# Patient Record
Sex: FEMALE | Race: WHITE | NOT HISPANIC OR LATINO | Employment: FULL TIME | URBAN - NONMETROPOLITAN AREA
[De-identification: names, ages, dates, MRNs, and addresses within clinical notes are randomized per-mention and may not be internally consistent; named-entity substitution may affect disease eponyms.]

---

## 2017-03-03 ENCOUNTER — HOSPITAL ENCOUNTER (EMERGENCY)
Facility: HOSPITAL | Age: 52
Discharge: HOME | End: 2017-03-03
Admitting: EMERGENCY MEDICINE

## 2017-03-03 VITALS
HEART RATE: 89 BPM | DIASTOLIC BLOOD PRESSURE: 59 MMHG | BODY MASS INDEX: 43.16 KG/M2 | SYSTOLIC BLOOD PRESSURE: 101 MMHG | HEIGHT: 64 IN | RESPIRATION RATE: 15 BRPM | OXYGEN SATURATION: 93 % | WEIGHT: 252.8 LBS | TEMPERATURE: 98.5 F

## 2017-03-03 DIAGNOSIS — J10.1 INFLUENZA B: ICD-10-CM

## 2017-03-03 DIAGNOSIS — G44.019 EPISODIC CLUSTER HEADACHE, NOT INTRACTABLE: Primary | ICD-10-CM

## 2017-03-03 LAB
ALBUMIN SERPL-MCNC: 4 G/DL (ref 3.5–5)
ALBUMIN/GLOB SERPL: 1.3 G/DL (ref 1.1–2.5)
ALP SERPL-CCNC: 140 U/L (ref 24–120)
ALT SERPL W P-5'-P-CCNC: 82 U/L (ref 0–54)
ANION GAP SERPL CALCULATED.3IONS-SCNC: 12 MMOL/L (ref 4–13)
AST SERPL-CCNC: 73 U/L (ref 7–45)
BASOPHILS # BLD AUTO: 0.02 10*3/MM3 (ref 0–0.2)
BASOPHILS NFR BLD AUTO: 0.6 % (ref 0–2)
BILIRUB SERPL-MCNC: 0.3 MG/DL (ref 0.1–1)
BUN BLD-MCNC: 11 MG/DL (ref 5–21)
BUN/CREAT SERPL: 12 (ref 7–25)
CALCIUM SPEC-SCNC: 9.9 MG/DL (ref 8.4–10.4)
CHLORIDE SERPL-SCNC: 100 MMOL/L (ref 98–110)
CO2 SERPL-SCNC: 24 MMOL/L (ref 24–31)
CREAT BLD-MCNC: 0.92 MG/DL (ref 0.5–1.4)
CRP SERPL-MCNC: 1.39 MG/DL (ref 0–0.99)
DEPRECATED RDW RBC AUTO: 46.6 FL (ref 40–54)
EOSINOPHIL # BLD AUTO: 0.02 10*3/MM3 (ref 0–0.7)
EOSINOPHIL NFR BLD AUTO: 0.6 % (ref 0–4)
ERYTHROCYTE [DISTWIDTH] IN BLOOD BY AUTOMATED COUNT: 14.4 % (ref 12–15)
ERYTHROCYTE [SEDIMENTATION RATE] IN BLOOD: 11 MM/HR (ref 0–20)
FLUAV AG NPH QL: NEGATIVE
FLUBV AG NPH QL IA: POSITIVE
GFR SERPL CREATININE-BSD FRML MDRD: 64 ML/MIN/1.73
GLOBULIN UR ELPH-MCNC: 3 GM/DL
GLUCOSE BLD-MCNC: 122 MG/DL (ref 70–100)
HCT VFR BLD AUTO: 46.1 % (ref 37–47)
HGB BLD-MCNC: 15.9 G/DL (ref 12–16)
IMM GRANULOCYTES # BLD: 0.01 10*3/MM3 (ref 0–0.03)
IMM GRANULOCYTES NFR BLD: 0.3 % (ref 0–5)
LYMPHOCYTES # BLD AUTO: 0.71 10*3/MM3 (ref 0.72–4.86)
LYMPHOCYTES NFR BLD AUTO: 20.5 % (ref 15–45)
MCH RBC QN AUTO: 30.7 PG (ref 28–32)
MCHC RBC AUTO-ENTMCNC: 34.5 G/DL (ref 33–36)
MCV RBC AUTO: 89 FL (ref 82–98)
MONOCYTES # BLD AUTO: 0.56 10*3/MM3 (ref 0.19–1.3)
MONOCYTES NFR BLD AUTO: 16.2 % (ref 4–12)
NEUTROPHILS # BLD AUTO: 2.14 10*3/MM3 (ref 1.87–8.4)
NEUTROPHILS NFR BLD AUTO: 61.8 % (ref 39–78)
PLATELET # BLD AUTO: 193 10*3/MM3 (ref 130–400)
PMV BLD AUTO: 10.6 FL (ref 6–12)
POTASSIUM BLD-SCNC: 4 MMOL/L (ref 3.5–5.3)
PROT SERPL-MCNC: 7 G/DL (ref 6.3–8.7)
RBC # BLD AUTO: 5.18 10*6/MM3 (ref 4.2–5.4)
S PYO AG THROAT QL: NEGATIVE
SODIUM BLD-SCNC: 136 MMOL/L (ref 135–145)
WBC NRBC COR # BLD: 3.46 10*3/MM3 (ref 4.8–10.8)

## 2017-03-03 PROCEDURE — 87880 STREP A ASSAY W/OPTIC: CPT | Performed by: EMERGENCY MEDICINE

## 2017-03-03 PROCEDURE — 85025 COMPLETE CBC W/AUTO DIFF WBC: CPT | Performed by: EMERGENCY MEDICINE

## 2017-03-03 PROCEDURE — 96374 THER/PROPH/DIAG INJ IV PUSH: CPT

## 2017-03-03 PROCEDURE — 25010000002 ONDANSETRON PER 1 MG: Performed by: EMERGENCY MEDICINE

## 2017-03-03 PROCEDURE — 80053 COMPREHEN METABOLIC PANEL: CPT | Performed by: EMERGENCY MEDICINE

## 2017-03-03 PROCEDURE — 25010000002 HYDROMORPHONE PER 4 MG: Performed by: EMERGENCY MEDICINE

## 2017-03-03 PROCEDURE — 99284 EMERGENCY DEPT VISIT MOD MDM: CPT

## 2017-03-03 PROCEDURE — 86140 C-REACTIVE PROTEIN: CPT | Performed by: EMERGENCY MEDICINE

## 2017-03-03 PROCEDURE — 25010000002 MEPERIDINE PER 100 MG: Performed by: EMERGENCY MEDICINE

## 2017-03-03 PROCEDURE — 96375 TX/PRO/DX INJ NEW DRUG ADDON: CPT

## 2017-03-03 PROCEDURE — 87804 INFLUENZA ASSAY W/OPTIC: CPT | Performed by: EMERGENCY MEDICINE

## 2017-03-03 PROCEDURE — 96361 HYDRATE IV INFUSION ADD-ON: CPT

## 2017-03-03 PROCEDURE — 87081 CULTURE SCREEN ONLY: CPT | Performed by: EMERGENCY MEDICINE

## 2017-03-03 PROCEDURE — 85651 RBC SED RATE NONAUTOMATED: CPT | Performed by: EMERGENCY MEDICINE

## 2017-03-03 RX ORDER — SODIUM CHLORIDE 9 MG/ML
125 INJECTION, SOLUTION INTRAVENOUS CONTINUOUS
Status: DISCONTINUED | OUTPATIENT
Start: 2017-03-03 | End: 2017-03-03 | Stop reason: HOSPADM

## 2017-03-03 RX ORDER — ACETAMINOPHEN 325 MG/1
650 TABLET ORAL EVERY 6 HOURS PRN
Status: DISCONTINUED | OUTPATIENT
Start: 2017-03-03 | End: 2017-03-03 | Stop reason: HOSPADM

## 2017-03-03 RX ORDER — ONDANSETRON 2 MG/ML
4 INJECTION INTRAMUSCULAR; INTRAVENOUS EVERY 6 HOURS PRN
Status: DISCONTINUED | OUTPATIENT
Start: 2017-03-03 | End: 2017-03-03 | Stop reason: HOSPADM

## 2017-03-03 RX ORDER — OSELTAMIVIR PHOSPHATE 75 MG/1
75 CAPSULE ORAL 2 TIMES DAILY
Qty: 10 CAPSULE | Refills: 0 | Status: SHIPPED | OUTPATIENT
Start: 2017-03-03 | End: 2017-03-20 | Stop reason: HOSPADM

## 2017-03-03 RX ORDER — MEPERIDINE HYDROCHLORIDE 25 MG/ML
25 INJECTION INTRAMUSCULAR; INTRAVENOUS; SUBCUTANEOUS ONCE
Status: COMPLETED | OUTPATIENT
Start: 2017-03-03 | End: 2017-03-03

## 2017-03-03 RX ORDER — HYDROCODONE BITARTRATE AND ACETAMINOPHEN 7.5; 325 MG/1; MG/1
1 TABLET ORAL EVERY 4 HOURS PRN
Qty: 12 TABLET | Refills: 0 | Status: SHIPPED | OUTPATIENT
Start: 2017-03-03 | End: 2017-05-21

## 2017-03-03 RX ADMIN — HYDROMORPHONE HYDROCHLORIDE 1 MG: 1 INJECTION, SOLUTION INTRAMUSCULAR; INTRAVENOUS; SUBCUTANEOUS at 17:00

## 2017-03-03 RX ADMIN — SODIUM CHLORIDE 1000 ML: 9 INJECTION, SOLUTION INTRAVENOUS at 16:58

## 2017-03-03 RX ADMIN — SODIUM CHLORIDE 1000 ML: 9 INJECTION, SOLUTION INTRAVENOUS at 18:01

## 2017-03-03 RX ADMIN — MEPERIDINE HYDROCHLORIDE 25 MG: 25 INJECTION, SOLUTION INTRAMUSCULAR; INTRAVENOUS; SUBCUTANEOUS at 18:02

## 2017-03-03 RX ADMIN — ACETAMINOPHEN 650 MG: 325 TABLET, FILM COATED ORAL at 17:07

## 2017-03-03 RX ADMIN — ONDANSETRON 4 MG: 2 INJECTION INTRAMUSCULAR; INTRAVENOUS at 16:57

## 2017-03-03 NOTE — ED NOTES
C/O cluster HA for the past week and has not been able to get rid of it. No different then her other HA other than it has lasted longer.      Faviola Rainey RN  03/03/17 3724

## 2017-03-03 NOTE — ED PROVIDER NOTES
Subjective   Patient is a 51 y.o. female presenting with headaches.   Headache   Associated symptoms: cough, fatigue and fever    Associated symptoms: no eye pain, no neck pain, no neck stiffness, no photophobia and no weakness        Patient is a 51-year-old  female chief complaint of persistent cluster headache.  The patient reports a history of cluster headaches that she experiences frequently.  She reports they usually last about 10-15 minutes at a time.  However, the past 4 days, she had recurrent cluster headaches have been persistent.  She has been exposed to grandchildren who have had flulike symptoms.  She has been complaining of right-sided sinus pressure.  She denies ear pain, sore throat, neck pain, or cough.  She had last taken Motrin last night.     Review of Systems   Constitutional: Positive for activity change, appetite change, chills, fatigue and fever.   Eyes: Negative.  Negative for photophobia, pain, discharge, redness, itching and visual disturbance.   Respiratory: Positive for cough. Negative for shortness of breath.    Cardiovascular: Negative.    Gastrointestinal: Negative.    Genitourinary: Negative.    Musculoskeletal: Negative for neck pain and neck stiffness.   Neurological: Positive for headaches. Negative for syncope, speech difficulty and weakness.       Past Medical History   Diagnosis Date   • Disease of thyroid gland    • Migraines        Allergies   Allergen Reactions   • Sulfa Antibiotics Anaphylaxis       Past Surgical History   Procedure Laterality Date   • Liver resection     •  section         History reviewed. No pertinent family history.    Social History     Social History   • Marital status: Unknown     Spouse name: N/A   • Number of children: N/A   • Years of education: N/A     Social History Main Topics   • Smoking status: Current Every Day Smoker     Packs/day: 0.50   • Smokeless tobacco: None   • Alcohol use No   • Drug use: No   • Sexual activity:  "Defer     Other Topics Concern   • None     Social History Narrative   • None       Prior to Admission medications    Not on File       Medications   sodium chloride 0.9 % infusion (not administered)   HYDROmorphone (DILAUDID) injection 1 mg (1 mg Intravenous Given 3/3/17 1700)   ondansetron (ZOFRAN) injection 4 mg (4 mg Intravenous Given 3/3/17 1657)   acetaminophen (TYLENOL) tablet 650 mg (650 mg Oral Given 3/3/17 1707)   sodium chloride 0.9 % bolus 1,000 mL (1,000 mL Intravenous New Bag 3/3/17 1801)   sodium chloride 0.9 % bolus 1,000 mL (0 mL Intravenous Stopped 3/3/17 1800)   meperidine (DEMEROL) injection 25 mg (25 mg Intravenous Given 3/3/17 1802)       Visit Vitals   • /73 (BP Location: Right arm, Patient Position: Lying)   • Pulse 100   • Temp (!) 100.7 °F (38.2 °C) (Temporal Artery )   • Resp 20   • Ht 64\" (162.6 cm)   • Wt 252 lb 12.8 oz (115 kg)   • SpO2 98%   • BMI 43.39 kg/m2         Objective   Physical Exam   Constitutional: She is oriented to person, place, and time. She appears well-developed and well-nourished. No distress.   HENT:   Head: Normocephalic and atraumatic.   Right Ear: External ear normal.   Left Ear: External ear normal.   Nose: Nose normal.   Mouth/Throat: Oropharynx is clear and moist.   Eyes: Conjunctivae and EOM are normal. Pupils are equal, round, and reactive to light.   Neck: Normal range of motion and full passive range of motion without pain. Neck supple. No rigidity. No tracheal deviation present. No Brudzinski's sign and no Kernig's sign noted.   Cardiovascular: Normal rate, regular rhythm, normal heart sounds and intact distal pulses.    No murmur heard.  Pulmonary/Chest: Effort normal and breath sounds normal.   Abdominal: Soft. Bowel sounds are normal. She exhibits no distension and no mass. There is no tenderness. There is no rebound and no guarding.   Musculoskeletal: Normal range of motion. She exhibits no edema.   Lymphadenopathy:     She has no cervical " adenopathy.   Neurological: She is alert and oriented to person, place, and time.   Skin: Skin is warm and dry. She is not diaphoretic.   Psychiatric: She has a normal mood and affect. Her behavior is normal. Judgment and thought content normal.   Nursing note and vitals reviewed.      Procedures         Lab Results (last 24 hours)     Procedure Component Value Units Date/Time    CBC & Differential [[id number]] Collected:  03/03/17 1654    Specimen:  Blood Updated:  03/03/17 1836    Narrative:       The following orders were created for panel order CBC & Differential.  Procedure                               Abnormality         Status                     ---------                               -----------         ------                     CBC Auto Differential[[id number]]         Abnormal            Final result                 Please view results for these tests on the individual orders.    Comprehensive Metabolic Panel [[id number]]  (Abnormal) Collected:  03/03/17 1654    Specimen:  Blood from Arm, Right Updated:  03/03/17 1741     Glucose 122 (H) mg/dL      BUN 11 mg/dL      Creatinine 0.92 mg/dL      Sodium 136 mmol/L      Potassium 4.0 mmol/L      Chloride 100 mmol/L      CO2 24.0 mmol/L      Calcium 9.9 mg/dL      Total Protein 7.0 g/dL      Albumin 4.00 g/dL      ALT (SGPT) 82 (H) U/L      AST (SGOT) 73 (H) U/L      Alkaline Phosphatase 140 (H) U/L      Total Bilirubin 0.3 mg/dL      eGFR Non African Amer 64 mL/min/1.73      Globulin 3.0 gm/dL      A/G Ratio 1.3 g/dL      BUN/Creatinine Ratio 12.0      Anion Gap 12.0 mmol/L     C-reactive Protein [[id number]]  (Abnormal) Collected:  03/03/17 1654    Specimen:  Blood from Arm, Right Updated:  03/03/17 1741     C-Reactive Protein 1.39 (H) mg/dL     Sedimentation Rate [[id number]]  (Normal) Collected:  03/03/17 1654    Specimen:  Blood from Arm, Right Updated:  03/03/17 1833     Sed Rate 11 mm/hr     CBC Auto Differential [[id number]]  (Abnormal) Collected:  03/03/17 1654     Specimen:  Blood from Arm, Right Updated:  03/03/17 1836     WBC 3.46 (L) 10*3/mm3      RBC 5.18 10*6/mm3      Hemoglobin 15.9 g/dL      Hematocrit 46.1 %      MCV 89.0 fL      MCH 30.7 pg      MCHC 34.5 g/dL      RDW 14.4 %      RDW-SD 46.6 fl      MPV 10.6 fL      Platelets 193 10*3/mm3      Neutrophil % 61.8 %      Lymphocyte % 20.5 %      Monocyte % 16.2 (H) %      Eosinophil % 0.6 %      Basophil % 0.6 %      Immature Grans % 0.3 %      Neutrophils, Absolute 2.14 10*3/mm3      Lymphocytes, Absolute 0.71 (L) 10*3/mm3      Monocytes, Absolute 0.56 10*3/mm3      Eosinophils, Absolute 0.02 10*3/mm3      Basophils, Absolute 0.02 10*3/mm3      Immature Grans, Absolute 0.01 10*3/mm3     Influenza Antigen [[id number]]  (Abnormal) Collected:  03/03/17 1706    Specimen:  Swab from Nasopharynx Updated:  03/03/17 1738     Influenza A Ag, EIA Negative      Influenza B Ag, EIA Positive (A)     Narrative:         Recommend confirmation of negative results by viral culture or molecular assay.    Rapid Strep A Screen [[id number]]  (Normal) Collected:  03/03/17 1706    Specimen:  Swab from Throat Updated:  03/03/17 1742     Strep A Ag Negative     Beta Strep Culture, Throat [[id number]] Collected:  03/03/17 1706    Specimen:  Swab from Throat Updated:  03/03/17 1741          No results found.    ED Course  ED Course    patient been reassessed.  She reports feeling better with the Demerol.  She and her daughter have been updated on test results.  They understand the treatment and prognosis of influenza.  She is return to the ED for any acute signs/symptoms.            MDM    Final diagnoses:   Episodic cluster headache, not intractable   Influenza B          BAR Hamilton  03/03/17 7332

## 2017-03-04 NOTE — DISCHARGE INSTRUCTIONS
Push fluids. Rest. Wash hands frequently. Avoid exposure to others. Motrin as needed for pain and fever per label.

## 2017-03-05 LAB — BACTERIA SPEC AEROBE CULT: NORMAL

## 2017-03-18 ENCOUNTER — APPOINTMENT (OUTPATIENT)
Dept: CT IMAGING | Facility: HOSPITAL | Age: 52
End: 2017-03-18

## 2017-03-18 ENCOUNTER — APPOINTMENT (OUTPATIENT)
Dept: GENERAL RADIOLOGY | Facility: HOSPITAL | Age: 52
End: 2017-03-18

## 2017-03-18 ENCOUNTER — HOSPITAL ENCOUNTER (INPATIENT)
Facility: HOSPITAL | Age: 52
LOS: 2 days | Discharge: HOME | End: 2017-03-20
Attending: EMERGENCY MEDICINE | Admitting: FAMILY MEDICINE

## 2017-03-18 DIAGNOSIS — G43.709 CHRONIC MIGRAINE WITHOUT AURA WITHOUT STATUS MIGRAINOSUS, NOT INTRACTABLE: ICD-10-CM

## 2017-03-18 DIAGNOSIS — R77.8 ELEVATED TROPONIN: ICD-10-CM

## 2017-03-18 DIAGNOSIS — D35.01 ADRENAL ADENOMA, RIGHT: ICD-10-CM

## 2017-03-18 DIAGNOSIS — I47.1 PSVT (PAROXYSMAL SUPRAVENTRICULAR TACHYCARDIA) (HCC): Primary | ICD-10-CM

## 2017-03-18 DIAGNOSIS — R55 SYNCOPE AND COLLAPSE: ICD-10-CM

## 2017-03-18 PROBLEM — I47.10 PSVT (PAROXYSMAL SUPRAVENTRICULAR TACHYCARDIA): Status: ACTIVE | Noted: 2017-03-18

## 2017-03-18 LAB
ALBUMIN SERPL-MCNC: 4 G/DL (ref 3.5–5)
ALBUMIN/GLOB SERPL: 1.3 G/DL (ref 1.1–2.5)
ALP SERPL-CCNC: 153 U/L (ref 24–120)
ALT SERPL W P-5'-P-CCNC: 73 U/L (ref 0–54)
AMPHET+METHAMPHET UR QL: NEGATIVE
ANION GAP SERPL CALCULATED.3IONS-SCNC: 15 MMOL/L (ref 4–13)
AST SERPL-CCNC: 48 U/L (ref 7–45)
BARBITURATES UR QL SCN: NEGATIVE
BASOPHILS # BLD AUTO: 0.04 10*3/MM3 (ref 0–0.2)
BASOPHILS NFR BLD AUTO: 0.4 % (ref 0–2)
BENZODIAZ UR QL SCN: NEGATIVE
BILIRUB SERPL-MCNC: 0.6 MG/DL (ref 0.1–1)
BUN BLD-MCNC: 19 MG/DL (ref 5–21)
BUN/CREAT SERPL: 18.3 (ref 7–25)
CALCIUM SPEC-SCNC: 10.2 MG/DL (ref 8.4–10.4)
CANNABINOIDS SERPL QL: NEGATIVE
CHLORIDE SERPL-SCNC: 102 MMOL/L (ref 98–110)
CO2 SERPL-SCNC: 19 MMOL/L (ref 24–31)
COCAINE UR QL: NEGATIVE
CREAT BLD-MCNC: 1.04 MG/DL (ref 0.5–1.4)
D DIMER PPP FEU-MCNC: 1.11 MG/L (FEU) (ref 0–0.5)
DEPRECATED RDW RBC AUTO: 46.1 FL (ref 40–54)
EOSINOPHIL # BLD AUTO: 0.18 10*3/MM3 (ref 0–0.7)
EOSINOPHIL NFR BLD AUTO: 1.8 % (ref 0–4)
ERYTHROCYTE [DISTWIDTH] IN BLOOD BY AUTOMATED COUNT: 14.1 % (ref 12–15)
ETHANOL UR QL: <0.01 %
FLUAV AG NPH QL: NEGATIVE
FLUBV AG NPH QL IA: NEGATIVE
GFR SERPL CREATININE-BSD FRML MDRD: 55 ML/MIN/1.73
GLOBULIN UR ELPH-MCNC: 3.1 GM/DL
GLUCOSE BLD-MCNC: 138 MG/DL (ref 70–100)
HCT VFR BLD AUTO: 43.7 % (ref 37–47)
HGB BLD-MCNC: 14.6 G/DL (ref 12–16)
HOLD SPECIMEN: NORMAL
HOLD SPECIMEN: NORMAL
IMM GRANULOCYTES # BLD: 0.04 10*3/MM3 (ref 0–0.03)
IMM GRANULOCYTES NFR BLD: 0.4 % (ref 0–5)
LYMPHOCYTES # BLD AUTO: 3.12 10*3/MM3 (ref 0.72–4.86)
LYMPHOCYTES NFR BLD AUTO: 30.4 % (ref 15–45)
MCH RBC QN AUTO: 29.9 PG (ref 28–32)
MCHC RBC AUTO-ENTMCNC: 33.4 G/DL (ref 33–36)
MCV RBC AUTO: 89.5 FL (ref 82–98)
METHADONE UR QL SCN: NEGATIVE
MONOCYTES # BLD AUTO: 0.94 10*3/MM3 (ref 0.19–1.3)
MONOCYTES NFR BLD AUTO: 9.2 % (ref 4–12)
NEUTROPHILS # BLD AUTO: 5.94 10*3/MM3 (ref 1.87–8.4)
NEUTROPHILS NFR BLD AUTO: 57.8 % (ref 39–78)
NT-PROBNP SERPL-MCNC: 69.1 PG/ML (ref 0–900)
OPIATES UR QL: NEGATIVE
PCP UR QL SCN: NEGATIVE
PLATELET # BLD AUTO: 412 10*3/MM3 (ref 130–400)
PMV BLD AUTO: 10.8 FL (ref 6–12)
POTASSIUM BLD-SCNC: 4.3 MMOL/L (ref 3.5–5.3)
PROT SERPL-MCNC: 7.1 G/DL (ref 6.3–8.7)
RBC # BLD AUTO: 4.88 10*6/MM3 (ref 4.2–5.4)
SODIUM BLD-SCNC: 136 MMOL/L (ref 135–145)
TROPONIN I SERPL-MCNC: 0 NG/ML (ref 0–0.07)
TROPONIN I SERPL-MCNC: 0.11 NG/ML (ref 0–0.07)
TSH SERPL DL<=0.05 MIU/L-ACNC: 2.98 MIU/ML (ref 0.47–4.68)
WBC NRBC COR # BLD: 10.26 10*3/MM3 (ref 4.8–10.8)
WHOLE BLOOD HOLD SPECIMEN: NORMAL
WHOLE BLOOD HOLD SPECIMEN: NORMAL

## 2017-03-18 PROCEDURE — 80053 COMPREHEN METABOLIC PANEL: CPT | Performed by: EMERGENCY MEDICINE

## 2017-03-18 PROCEDURE — 25010000002 ENOXAPARIN PER 10 MG: Performed by: EMERGENCY MEDICINE

## 2017-03-18 PROCEDURE — 93005 ELECTROCARDIOGRAM TRACING: CPT

## 2017-03-18 PROCEDURE — 80307 DRUG TEST PRSMV CHEM ANLYZR: CPT | Performed by: EMERGENCY MEDICINE

## 2017-03-18 PROCEDURE — 93005 ELECTROCARDIOGRAM TRACING: CPT | Performed by: EMERGENCY MEDICINE

## 2017-03-18 PROCEDURE — 87804 INFLUENZA ASSAY W/OPTIC: CPT | Performed by: EMERGENCY MEDICINE

## 2017-03-18 PROCEDURE — 84443 ASSAY THYROID STIM HORMONE: CPT | Performed by: EMERGENCY MEDICINE

## 2017-03-18 PROCEDURE — 99284 EMERGENCY DEPT VISIT MOD MDM: CPT

## 2017-03-18 PROCEDURE — 71010 HC CHEST PA OR AP: CPT

## 2017-03-18 PROCEDURE — 93010 ELECTROCARDIOGRAM REPORT: CPT | Performed by: INTERNAL MEDICINE

## 2017-03-18 PROCEDURE — 25010000002 LORAZEPAM PER 2 MG: Performed by: EMERGENCY MEDICINE

## 2017-03-18 PROCEDURE — 83880 ASSAY OF NATRIURETIC PEPTIDE: CPT | Performed by: EMERGENCY MEDICINE

## 2017-03-18 PROCEDURE — 0 IOPAMIDOL PER 1 ML: Performed by: EMERGENCY MEDICINE

## 2017-03-18 PROCEDURE — 71275 CT ANGIOGRAPHY CHEST: CPT

## 2017-03-18 PROCEDURE — 25010000002 ADENOSINE PER 6 MG

## 2017-03-18 PROCEDURE — 85025 COMPLETE CBC W/AUTO DIFF WBC: CPT | Performed by: EMERGENCY MEDICINE

## 2017-03-18 PROCEDURE — 25010000002 ADENOSINE PER 6 MG: Performed by: EMERGENCY MEDICINE

## 2017-03-18 PROCEDURE — 84484 ASSAY OF TROPONIN QUANT: CPT

## 2017-03-18 PROCEDURE — 25010000002 ONDANSETRON PER 1 MG: Performed by: EMERGENCY MEDICINE

## 2017-03-18 PROCEDURE — 25010000002 MORPHINE PER 10 MG: Performed by: EMERGENCY MEDICINE

## 2017-03-18 PROCEDURE — 85379 FIBRIN DEGRADATION QUANT: CPT | Performed by: EMERGENCY MEDICINE

## 2017-03-18 RX ORDER — ONDANSETRON 2 MG/ML
4 INJECTION INTRAMUSCULAR; INTRAVENOUS EVERY 6 HOURS PRN
Status: DISCONTINUED | OUTPATIENT
Start: 2017-03-18 | End: 2017-03-20 | Stop reason: HOSPADM

## 2017-03-18 RX ORDER — MAGNESIUM HYDROXIDE/ALUMINUM HYDROXICE/SIMETHICONE 120; 1200; 1200 MG/30ML; MG/30ML; MG/30ML
30 SUSPENSION ORAL ONCE
Status: DISCONTINUED | OUTPATIENT
Start: 2017-03-18 | End: 2017-03-18

## 2017-03-18 RX ORDER — MORPHINE SULFATE 4 MG/ML
4 INJECTION, SOLUTION INTRAMUSCULAR; INTRAVENOUS ONCE
Status: COMPLETED | OUTPATIENT
Start: 2017-03-18 | End: 2017-03-18

## 2017-03-18 RX ORDER — HYDROCODONE BITARTRATE AND ACETAMINOPHEN 7.5; 325 MG/1; MG/1
1 TABLET ORAL EVERY 4 HOURS PRN
Status: DISCONTINUED | OUTPATIENT
Start: 2017-03-18 | End: 2017-03-19

## 2017-03-18 RX ORDER — ACETAMINOPHEN 500 MG
1000 TABLET ORAL ONCE
Status: COMPLETED | OUTPATIENT
Start: 2017-03-18 | End: 2017-03-18

## 2017-03-18 RX ORDER — ADENOSINE 3 MG/ML
12 INJECTION, SOLUTION INTRAVENOUS ONCE
Status: COMPLETED | OUTPATIENT
Start: 2017-03-18 | End: 2017-03-18

## 2017-03-18 RX ORDER — ADENOSINE 3 MG/ML
6 INJECTION, SOLUTION INTRAVENOUS ONCE
Status: COMPLETED | OUTPATIENT
Start: 2017-03-18 | End: 2017-03-18

## 2017-03-18 RX ORDER — ACETAMINOPHEN 325 MG/1
650 TABLET ORAL EVERY 6 HOURS PRN
Status: DISCONTINUED | OUTPATIENT
Start: 2017-03-18 | End: 2017-03-20 | Stop reason: HOSPADM

## 2017-03-18 RX ORDER — LORAZEPAM 2 MG/ML
1 INJECTION INTRAMUSCULAR ONCE
Status: COMPLETED | OUTPATIENT
Start: 2017-03-18 | End: 2017-03-18

## 2017-03-18 RX ORDER — METOPROLOL TARTRATE 5 MG/5ML
5 INJECTION INTRAVENOUS ONCE
Status: COMPLETED | OUTPATIENT
Start: 2017-03-18 | End: 2017-03-18

## 2017-03-18 RX ORDER — SODIUM CHLORIDE 0.9 % (FLUSH) 0.9 %
10 SYRINGE (ML) INJECTION AS NEEDED
Status: DISCONTINUED | OUTPATIENT
Start: 2017-03-18 | End: 2017-03-20 | Stop reason: HOSPADM

## 2017-03-18 RX ORDER — IPRATROPIUM BROMIDE AND ALBUTEROL SULFATE 2.5; .5 MG/3ML; MG/3ML
3 SOLUTION RESPIRATORY (INHALATION) EVERY 4 HOURS PRN
Status: DISCONTINUED | OUTPATIENT
Start: 2017-03-18 | End: 2017-03-20 | Stop reason: HOSPADM

## 2017-03-18 RX ORDER — ONDANSETRON 2 MG/ML
4 INJECTION INTRAMUSCULAR; INTRAVENOUS ONCE
Status: COMPLETED | OUTPATIENT
Start: 2017-03-18 | End: 2017-03-18

## 2017-03-18 RX ORDER — ADENOSINE 3 MG/ML
INJECTION, SOLUTION INTRAVENOUS
Status: COMPLETED
Start: 2017-03-18 | End: 2017-03-18

## 2017-03-18 RX ORDER — OSELTAMIVIR PHOSPHATE 75 MG/1
75 CAPSULE ORAL EVERY 12 HOURS SCHEDULED
Status: DISCONTINUED | OUTPATIENT
Start: 2017-03-19 | End: 2017-03-20 | Stop reason: HOSPADM

## 2017-03-18 RX ORDER — ACETAMINOPHEN 500 MG
1000 TABLET ORAL ONCE
Status: DISCONTINUED | OUTPATIENT
Start: 2017-03-18 | End: 2017-03-18

## 2017-03-18 RX ORDER — SODIUM CHLORIDE 0.9 % (FLUSH) 0.9 %
1-10 SYRINGE (ML) INJECTION AS NEEDED
Status: DISCONTINUED | OUTPATIENT
Start: 2017-03-18 | End: 2017-03-20 | Stop reason: HOSPADM

## 2017-03-18 RX ADMIN — ADENOSINE 6 MG: 3 SOLUTION INTRAVENOUS at 18:19

## 2017-03-18 RX ADMIN — ADENOSINE 12 MG: 3 INJECTION, SOLUTION INTRAVENOUS at 18:21

## 2017-03-18 RX ADMIN — Medication 10 ML: at 18:16

## 2017-03-18 RX ADMIN — ACETAMINOPHEN 1000 MG: 500 TABLET ORAL at 19:43

## 2017-03-18 RX ADMIN — IOPAMIDOL 100 ML: 755 INJECTION, SOLUTION INTRAVENOUS at 20:17

## 2017-03-18 RX ADMIN — Medication 10 ML: at 21:41

## 2017-03-18 RX ADMIN — MORPHINE SULFATE 4 MG: 4 INJECTION, SOLUTION INTRAMUSCULAR; INTRAVENOUS at 21:40

## 2017-03-18 RX ADMIN — METOROPROLOL TARTRATE 5 MG: 5 INJECTION, SOLUTION INTRAVENOUS at 21:00

## 2017-03-18 RX ADMIN — ENOXAPARIN SODIUM 110 MG: 120 INJECTION SUBCUTANEOUS at 21:41

## 2017-03-18 RX ADMIN — Medication 10 ML: at 21:02

## 2017-03-18 RX ADMIN — ONDANSETRON HYDROCHLORIDE 4 MG: 2 SOLUTION INTRAMUSCULAR; INTRAVENOUS at 21:40

## 2017-03-18 RX ADMIN — ADENOSINE 12 MG: 3 INJECTION, SOLUTION INTRAVENOUS at 18:19

## 2017-03-18 RX ADMIN — LORAZEPAM 1 MG: 2 INJECTION INTRAMUSCULAR; INTRAVENOUS at 18:39

## 2017-03-18 RX ADMIN — Medication 10 ML: at 21:06

## 2017-03-18 NOTE — ED PROVIDER NOTES
Subjective   Patient is a 51 y.o. female presenting with palpitations.   History provided by:  Patient  Palpitations   Palpitations quality:  Fast  Onset quality:  Sudden  Duration:  1 hour  Timing:  Constant  Progression:  Unchanged  Chronicity:  New  Context: not anxiety, not appetite suppressants, not blood loss, not bronchodilators, not caffeine, not dehydration, not exercise, not hyperventilation, not illicit drugs, not nicotine and not stimulant use    Relieved by:  Nothing  Worsened by:  Nothing  Ineffective treatments:  None tried  Associated symptoms: chest pressure and shortness of breath    Risk factors: no diabetes mellitus, no heart disease, no hx of atrial fibrillation, no hx of DVT, no hx of PE, no hx of thyroid disease, no hypercoagulable state, no hyperthyroidism, no OTC sinus medications and no stress        Review of Systems   Constitutional: Negative.    HENT: Negative.    Respiratory: Positive for shortness of breath.    Cardiovascular: Positive for palpitations.   Gastrointestinal: Negative.    Genitourinary: Negative.    Musculoskeletal: Negative.    Skin: Negative.    Neurological: Negative.    Hematological: Negative.    Psychiatric/Behavioral: Negative.    All other systems reviewed and are negative.      Past Medical History   Diagnosis Date   • Disease of thyroid gland    • Migraines    • Seizures    • Stroke    • TIA (transient ischemic attack)        Allergies   Allergen Reactions   • Sulfa Antibiotics Anaphylaxis   • Latex        Past Surgical History   Procedure Laterality Date   • Liver resection     •  section     • Thyroid surgery     • Parathyroid gland surgery     • Hemangioma excision       from liver.        History reviewed. No pertinent family history.    Social History     Social History   • Marital status:      Spouse name: N/A   • Number of children: N/A   • Years of education: N/A     Social History Main Topics   • Smoking status: Current Every Day Smoker      Packs/day: 0.50   • Smokeless tobacco: None   • Alcohol use No   • Drug use: No   • Sexual activity: Defer     Other Topics Concern   • None     Social History Narrative       Prior to Admission medications    Medication Sig Start Date End Date Taking? Authorizing Provider   HYDROcodone-acetaminophen (NORCO) 7.5-325 MG per tablet Take 1 tablet by mouth Every 4 (Four) Hours As Needed for moderate pain (4-6). 3/3/17   Leann DERAS MD   oseltamivir (TAMIFLU) 75 MG capsule Take 1 capsule by mouth 2 (Two) Times a Day. 3/3/17   Leann DERAS MD       Medications   sodium chloride 0.9 % flush 10 mL (10 mL Intravenous Given 3/18/17 2141)   HYDROcodone-acetaminophen (NORCO) 7.5-325 MG per tablet 1 tablet (not administered)   oseltamivir (TAMIFLU) capsule 75 mg (not administered)   sodium chloride 0.9 % flush 1-10 mL (not administered)   enoxaparin (LOVENOX) syringe 40 mg (not administered)   acetaminophen (TYLENOL) tablet 650 mg (not administered)   ondansetron (ZOFRAN) injection 4 mg (4 mg Intravenous Given 3/19/17 0219)   ipratropium-albuterol (DUO-NEB) nebulizer solution 3 mL (not administered)   metoprolol tartrate (LOPRESSOR) tablet 12.5 mg (not administered)   pantoprazole (PROTONIX) EC tablet 40 mg (40 mg Oral Given 3/19/17 0634)   sodium chloride 0.9 % infusion (not administered)   aspirin EC tablet 81 mg (not administered)   nitroglycerin (NITROSTAT) SL tablet 0.4 mg (not administered)   adenosine (ADENOCARD) 6 MG/2ML injection  - ADS Override Pull (6 mg  Given 3/18/17 1819)   LORazepam (ATIVAN) injection 1 mg (1 mg Intravenous Given 3/18/17 1839)   adenosine (ADENOCARD) injection 12 mg (12 mg Intravenous Given 3/18/17 1819)   adenosine (ADENOCARD) injection 6 mg (12 mg Intravenous Given 3/18/17 1821)   acetaminophen (TYLENOL) tablet 1,000 mg (1,000 mg Oral Given 3/18/17 1943)   iopamidol (ISOVUE-370) 76 % injection 100 mL (100 mL Intravenous Given 3/18/17 2017)   metoprolol tartrate (LOPRESSOR) injection 5  mg (5 mg Intravenous Given 3/18/17 2100)   Morphine sulfate (PF) injection 4 mg (4 mg Intravenous Given 3/18/17 2140)   ondansetron (ZOFRAN) injection 4 mg (4 mg Intravenous Given 3/18/17 2140)   enoxaparin (LOVENOX) syringe 110 mg (110 mg Subcutaneous Given 3/18/17 2141)   HYDROmorphone (DILAUDID) injection 1 mg (1 mg Intravenous Given 3/19/17 0120)       Vitals:    03/19/17 0746   BP: 132/85   Pulse: 76   Resp: 18   Temp: 97.2 °F (36.2 °C)   SpO2: 95%         Objective   Physical Exam   Constitutional: She is oriented to person, place, and time. She appears well-nourished.   HENT:   Head: Normocephalic and atraumatic.   Nose: Nose normal.   Eyes: EOM are normal. Pupils are equal, round, and reactive to light.   Neck: Normal range of motion. Neck supple.   Cardiovascular: Regular rhythm.    Pulmonary/Chest: Effort normal and breath sounds normal.   Abdominal: Soft. Bowel sounds are normal.   Musculoskeletal: Normal range of motion.   Neurological: She is alert and oriented to person, place, and time.   Skin: Skin is warm and dry.   Psychiatric: She has a normal mood and affect. Her behavior is normal.   Nursing note and vitals reviewed.      Procedures         Lab Results (last 24 hours)     Procedure Component Value Units Date/Time    BNP [[id number]]  (Normal) Collected:  03/18/17 1826    Specimen:  Blood Updated:  03/18/17 1944     proBNP 69.1 pg/mL     CBC & Differential [[id number]] Collected:  03/18/17 1826    Specimen:  Blood Updated:  03/18/17 1959    Narrative:       The following orders were created for panel order CBC & Differential.  Procedure                               Abnormality         Status                     ---------                               -----------         ------                     CBC Auto Differential[[id number]]         Abnormal            Final result                 Please view results for these tests on the individual orders.    D-dimer, Quantitative [[id number]]  (Abnormal)  Collected:  03/18/17 1826    Specimen:  Blood Updated:  03/18/17 1944     D-Dimer, Quantitative 1.11 (H) mg/L (FEU)     Narrative:       Reference Range is 0-0.50 mg/L FEU. However, results <0.50 mg/L FEU tends to rule out DVT or PE. Results >0.50 mg/L FEU are not useful in predicting absence or presence of DVT or PE.    TSH [[id number]]  (Normal) Collected:  03/18/17 1826    Specimen:  Blood Updated:  03/18/17 2006     TSH 2.980 mIU/mL     Comprehensive Metabolic Panel [[id number]]  (Abnormal) Collected:  03/18/17 1826    Specimen:  Blood Updated:  03/18/17 1944     Glucose 138 (H) mg/dL      BUN 19 mg/dL      Creatinine 1.04 mg/dL      Sodium 136 mmol/L      Potassium 4.3 mmol/L      Chloride 102 mmol/L      CO2 19.0 (L) mmol/L      Calcium 10.2 mg/dL      Total Protein 7.1 g/dL      Albumin 4.00 g/dL      ALT (SGPT) 73 (H) U/L      AST (SGOT) 48 (H) U/L      Alkaline Phosphatase 153 (H) U/L      Total Bilirubin 0.6 mg/dL      eGFR Non African Amer 55 (L) mL/min/1.73      Globulin 3.1 gm/dL      A/G Ratio 1.3 g/dL      BUN/Creatinine Ratio 18.3      Anion Gap 15.0 (H) mmol/L     CBC Auto Differential [[id number]]  (Abnormal) Collected:  03/18/17 1826    Specimen:  Blood Updated:  03/18/17 1959     WBC 10.26 10*3/mm3      RBC 4.88 10*6/mm3      Hemoglobin 14.6 g/dL      Hematocrit 43.7 %      MCV 89.5 fL      MCH 29.9 pg      MCHC 33.4 g/dL      RDW 14.1 %      RDW-SD 46.1 fl      MPV 10.8 fL      Platelets 412 (H) 10*3/mm3      Neutrophil % 57.8 %      Lymphocyte % 30.4 %      Monocyte % 9.2 %      Eosinophil % 1.8 %      Basophil % 0.4 %      Immature Grans % 0.4 %      Neutrophils, Absolute 5.94 10*3/mm3      Lymphocytes, Absolute 3.12 10*3/mm3      Monocytes, Absolute 0.94 10*3/mm3      Eosinophils, Absolute 0.18 10*3/mm3      Basophils, Absolute 0.04 10*3/mm3      Immature Grans, Absolute 0.04 (H) 10*3/mm3     Ethanol [[id number]]  (Normal) Collected:  03/18/17 1826    Specimen:  Blood Updated:  03/18/17 2002      Ethanol % <0.010 %     Narrative:       Not for legal purposes. Chain of Custody not followed.     POC Troponin, Rapid [[id number]]  (Normal) Collected:  03/18/17 1835    Specimen:  Blood Updated:  03/18/17 1850     Troponin I 0.00 ng/mL       Serial Number: [id number]    : [id number]       Urine Drug Screen [[id number]]  (Normal) Collected:  03/18/17 2001    Specimen:  Urine from Urine, Clean Catch Updated:  03/18/17 2106     Amphetamine Screen, Urine Negative      Barbiturates Screen, Urine Negative      Benzodiazepine Screen, Urine Negative      Cocaine Screen, Urine Negative      Methadone Screen, Urine Negative      Opiate Screen Negative      Phencyclidine (PCP), Urine Negative      THC, Screen, Urine Negative     Narrative:       Negative Thresholds For Drugs Screened in Urine:    Amphetamines          500 ng/ml  Barbiturates          200 ng/ml  Benzodiazepines       200 ng/ml  Cocaine               150 ng/ml  Methadone             150 ng/ml  Opiates               300 ng/ml  Phencyclidine         25 ng/ml  THC                      50 ng/ml    The normal value for all drugs tested is negative. This report includes final unconfirmed screening results.  A positive result by this assay can be, at your request, sent to the Reference Lab for confirmation by gas chromatography. Unconfirmed results must not be used for non-medical purposes, such as employment or legal testing. Clinical consideration should be applied to any drug of abuse test result, particularly when unconfirmed results are used.    POC Troponin, Rapid [[id number]]  (Abnormal) Collected:  03/18/17 2111    Specimen:  Blood Updated:  03/18/17 2125     Troponin I 0.11 (H) ng/mL       Serial Number: [id number]    : [id number]       Influenza Antigen [[id number]]  (Normal) Collected:  03/18/17 2225    Specimen:  Swab from Nasopharynx Updated:  03/18/17 2240     Influenza A Ag, EIA Negative      Influenza B Ag, EIA Negative     Narrative:         Recommend  confirmation of negative results by viral culture or molecular assay.    CBC Auto Differential [[id number]]  (Normal) Collected:  03/19/17 0012    Specimen:  Blood Updated:  03/19/17 0022     WBC 7.76 10*3/mm3      RBC 4.76 10*6/mm3      Hemoglobin 14.2 g/dL      Hematocrit 42.6 %      MCV 89.5 fL      MCH 29.8 pg      MCHC 33.3 g/dL      RDW 14.2 %      RDW-SD 46.1 fl      MPV 9.9 fL      Platelets 339 10*3/mm3      Neutrophil % 64.5 %      Lymphocyte % 25.4 %      Monocyte % 7.2 %      Eosinophil % 2.3 %      Basophil % 0.3 %      Immature Grans % 0.3 %      Neutrophils, Absolute 5.01 10*3/mm3      Lymphocytes, Absolute 1.97 10*3/mm3      Monocytes, Absolute 0.56 10*3/mm3      Eosinophils, Absolute 0.18 10*3/mm3      Basophils, Absolute 0.02 10*3/mm3      Immature Grans, Absolute 0.02 10*3/mm3     Troponin [[id number]]  (Abnormal) Collected:  03/19/17 0013    Specimen:  Blood Updated:  03/19/17 0045     Troponin I 0.158 (H) ng/mL     Troponin [[id number]]  (Abnormal) Collected:  03/19/17 0402    Specimen:  Blood Updated:  03/19/17 0438     Troponin I 0.130 (H) ng/mL     Basic Metabolic Panel [[id number]]  (Abnormal) Collected:  03/19/17 0402    Specimen:  Blood Updated:  03/19/17 0434     Glucose 116 (H) mg/dL      BUN 14 mg/dL      Creatinine 0.84 mg/dL      Sodium 138 mmol/L      Potassium 4.3 mmol/L      Chloride 107 mmol/L      CO2 24.0 mmol/L      Calcium 9.8 mg/dL      eGFR Non African Amer 71 mL/min/1.73      BUN/Creatinine Ratio 16.7      Anion Gap 7.0 mmol/L     Narrative:       GFR Normal >60  Chronic Kidney Disease <60  Kidney Failure <15    CBC (No Diff) [[id number]]  (Abnormal) Collected:  03/19/17 0402    Specimen:  Blood Updated:  03/19/17 0416     WBC 6.19 10*3/mm3      RBC 4.67 10*6/mm3      Hemoglobin 13.8 g/dL      Hematocrit 42.0 %      MCV 89.9 fL      MCH 29.6 pg      MCHC 32.9 (L) g/dL      RDW 14.4 %      RDW-SD 47.0 fl      MPV 9.8 fL      Platelets 322 10*3/mm3           CT Angiogram Chest With  Contrast   Final Result   1. No evidence of embolic disease.   2. Suspicious for adenoma associated with the right adrenal gland.       This report was finalized on 03/18/2017 21:07 by Dr. Faraz Browning MD.      XR Chest 1 View   Final Result   Negative single view chest.   This report was finalized on 03/18/2017 19:38 by Dr. Faraz Browning MD.      US Carotid Bilateral    (Results Pending)       ED Course  ED Course   Comment By Time   Patient was given 6 mg of adenosine which slowed her heart rate did not converted.  We then repeated with 12 mg of adenosine and she converted to a normal sinus rhythm with a rate of around 100 at that time and began feeling much better.  Were now waiting on the workup.  She will be turned over to Dr. Shaffer. Jamel Carrion Jr., MD 03/18 1839   Suspicious for adenoma associated with the right adrenal gland.     Pt was informed  Calos Shaffer MD 03/18 2047   Sinus rhythm with nonspecific ST-T wave changes Calos Shaffer MD 03/18 2203   Patient came in PSVT provided and was seen repeat cardiac markers are elevated she needs observation in  the hospital Calos Shaffer MD 03/18 2204   Dr Gutierrez wanted a flu swab he will follow up on results  Calos Shaffer MD 03/18 2215   Patient is complaining of headache Which is something chronic like her usual migraine headaches Calos Shaffer MD 03/18 2218    The patient's symptoms are now better.  Patient is not having pain.  No chest pain, difficulty breathing, nausea, vomiting or palpitations.  No anxiety or dizziness.  Vital signs have been reviewed and appear to be correct.  Physical exam findings are improved.  Alert.  Oriented X3 .  No acute distress.  Breath sounds normal.  No respiratory distress, decreased breath sounds or wheezes.  Normal heart rate and rhythm.  Heart sounds normal.  .  Abdomen soft and nontender.  No abdominal tenderness or guarding or rebound tenderness.  Skin warm and dry.  No cyanosis or diaphoresis.  Oriented X  3.  Not anxious.  No alteration in mental status or weakness.         Calos Shaffer MD 03/18 2752          MDM  Number of Diagnoses or Management Options  Adrenal adenoma, right:   Chronic migraine without aura without status migrainosus, not intractable:   Elevated troponin:   PSVT (paroxysmal supraventricular tachycardia):       Final diagnoses:   PSVT (paroxysmal supraventricular tachycardia)   Elevated troponin   Chronic migraine without aura without status migrainosus, not intractable   Adrenal adenoma, right        Jamel Carrion Jr., MD  03/19/17 5019

## 2017-03-19 ENCOUNTER — APPOINTMENT (OUTPATIENT)
Dept: ULTRASOUND IMAGING | Facility: HOSPITAL | Age: 52
End: 2017-03-19

## 2017-03-19 PROBLEM — R55 SYNCOPE AND COLLAPSE: Status: ACTIVE | Noted: 2017-03-19

## 2017-03-19 LAB
ANION GAP SERPL CALCULATED.3IONS-SCNC: 7 MMOL/L (ref 4–13)
BASOPHILS # BLD AUTO: 0.02 10*3/MM3 (ref 0–0.2)
BASOPHILS NFR BLD AUTO: 0.3 % (ref 0–2)
BUN BLD-MCNC: 14 MG/DL (ref 5–21)
BUN/CREAT SERPL: 16.7 (ref 7–25)
CALCIUM SPEC-SCNC: 9.8 MG/DL (ref 8.4–10.4)
CHLORIDE SERPL-SCNC: 107 MMOL/L (ref 98–110)
CO2 SERPL-SCNC: 24 MMOL/L (ref 24–31)
CREAT BLD-MCNC: 0.84 MG/DL (ref 0.5–1.4)
DEPRECATED RDW RBC AUTO: 46.1 FL (ref 40–54)
DEPRECATED RDW RBC AUTO: 47 FL (ref 40–54)
EOSINOPHIL # BLD AUTO: 0.18 10*3/MM3 (ref 0–0.7)
EOSINOPHIL NFR BLD AUTO: 2.3 % (ref 0–4)
ERYTHROCYTE [DISTWIDTH] IN BLOOD BY AUTOMATED COUNT: 14.2 % (ref 12–15)
ERYTHROCYTE [DISTWIDTH] IN BLOOD BY AUTOMATED COUNT: 14.4 % (ref 12–15)
GFR SERPL CREATININE-BSD FRML MDRD: 71 ML/MIN/1.73
GLUCOSE BLD-MCNC: 116 MG/DL (ref 70–100)
HCT VFR BLD AUTO: 42 % (ref 37–47)
HCT VFR BLD AUTO: 42.6 % (ref 37–47)
HGB BLD-MCNC: 13.8 G/DL (ref 12–16)
HGB BLD-MCNC: 14.2 G/DL (ref 12–16)
IMM GRANULOCYTES # BLD: 0.02 10*3/MM3 (ref 0–0.03)
IMM GRANULOCYTES NFR BLD: 0.3 % (ref 0–5)
LYMPHOCYTES # BLD AUTO: 1.97 10*3/MM3 (ref 0.72–4.86)
LYMPHOCYTES NFR BLD AUTO: 25.4 % (ref 15–45)
MCH RBC QN AUTO: 29.6 PG (ref 28–32)
MCH RBC QN AUTO: 29.8 PG (ref 28–32)
MCHC RBC AUTO-ENTMCNC: 32.9 G/DL (ref 33–36)
MCHC RBC AUTO-ENTMCNC: 33.3 G/DL (ref 33–36)
MCV RBC AUTO: 89.5 FL (ref 82–98)
MCV RBC AUTO: 89.9 FL (ref 82–98)
MONOCYTES # BLD AUTO: 0.56 10*3/MM3 (ref 0.19–1.3)
MONOCYTES NFR BLD AUTO: 7.2 % (ref 4–12)
NEUTROPHILS # BLD AUTO: 5.01 10*3/MM3 (ref 1.87–8.4)
NEUTROPHILS NFR BLD AUTO: 64.5 % (ref 39–78)
PLATELET # BLD AUTO: 322 10*3/MM3 (ref 130–400)
PLATELET # BLD AUTO: 339 10*3/MM3 (ref 130–400)
PMV BLD AUTO: 9.8 FL (ref 6–12)
PMV BLD AUTO: 9.9 FL (ref 6–12)
POTASSIUM BLD-SCNC: 4.3 MMOL/L (ref 3.5–5.3)
RBC # BLD AUTO: 4.67 10*6/MM3 (ref 4.2–5.4)
RBC # BLD AUTO: 4.76 10*6/MM3 (ref 4.2–5.4)
SODIUM BLD-SCNC: 138 MMOL/L (ref 135–145)
TROPONIN I SERPL-MCNC: 0.07 NG/ML (ref 0–0.03)
TROPONIN I SERPL-MCNC: 0.13 NG/ML (ref 0–0.03)
TROPONIN I SERPL-MCNC: 0.16 NG/ML (ref 0–0.03)
WBC NRBC COR # BLD: 6.19 10*3/MM3 (ref 4.8–10.8)
WBC NRBC COR # BLD: 7.76 10*3/MM3 (ref 4.8–10.8)

## 2017-03-19 PROCEDURE — 25010000002 ENOXAPARIN PER 10 MG: Performed by: INTERNAL MEDICINE

## 2017-03-19 PROCEDURE — 85025 COMPLETE CBC W/AUTO DIFF WBC: CPT | Performed by: EMERGENCY MEDICINE

## 2017-03-19 PROCEDURE — 99253 IP/OBS CNSLTJ NEW/EST LOW 45: CPT | Performed by: INTERNAL MEDICINE

## 2017-03-19 PROCEDURE — 94799 UNLISTED PULMONARY SVC/PX: CPT

## 2017-03-19 PROCEDURE — 85027 COMPLETE CBC AUTOMATED: CPT | Performed by: INTERNAL MEDICINE

## 2017-03-19 PROCEDURE — 84484 ASSAY OF TROPONIN QUANT: CPT | Performed by: INTERNAL MEDICINE

## 2017-03-19 PROCEDURE — 80048 BASIC METABOLIC PNL TOTAL CA: CPT | Performed by: INTERNAL MEDICINE

## 2017-03-19 PROCEDURE — 25010000002 HYDROMORPHONE PER 4 MG: Performed by: INTERNAL MEDICINE

## 2017-03-19 PROCEDURE — 93880 EXTRACRANIAL BILAT STUDY: CPT | Performed by: SURGERY

## 2017-03-19 PROCEDURE — 25010000002 ONDANSETRON PER 1 MG: Performed by: INTERNAL MEDICINE

## 2017-03-19 PROCEDURE — 93880 EXTRACRANIAL BILAT STUDY: CPT

## 2017-03-19 PROCEDURE — 99222 1ST HOSP IP/OBS MODERATE 55: CPT | Performed by: PSYCHIATRY & NEUROLOGY

## 2017-03-19 RX ORDER — PANTOPRAZOLE SODIUM 40 MG/1
40 TABLET, DELAYED RELEASE ORAL
Status: DISCONTINUED | OUTPATIENT
Start: 2017-03-19 | End: 2017-03-20 | Stop reason: HOSPADM

## 2017-03-19 RX ORDER — SODIUM CHLORIDE 9 MG/ML
50 INJECTION, SOLUTION INTRAVENOUS CONTINUOUS
Status: DISCONTINUED | OUTPATIENT
Start: 2017-03-19 | End: 2017-03-20 | Stop reason: HOSPADM

## 2017-03-19 RX ORDER — NITROGLYCERIN 0.4 MG/1
0.4 TABLET SUBLINGUAL
Status: DISCONTINUED | OUTPATIENT
Start: 2017-03-19 | End: 2017-03-20 | Stop reason: HOSPADM

## 2017-03-19 RX ORDER — ASPIRIN 81 MG/1
81 TABLET ORAL DAILY
Status: DISCONTINUED | OUTPATIENT
Start: 2017-03-19 | End: 2017-03-20 | Stop reason: HOSPADM

## 2017-03-19 RX ORDER — METOPROLOL TARTRATE 5 MG/5ML
5 INJECTION INTRAVENOUS EVERY 4 HOURS PRN
Status: DISCONTINUED | OUTPATIENT
Start: 2017-03-19 | End: 2017-03-20 | Stop reason: HOSPADM

## 2017-03-19 RX ADMIN — PANTOPRAZOLE SODIUM 40 MG: 40 TABLET, DELAYED RELEASE ORAL at 06:34

## 2017-03-19 RX ADMIN — HYDROMORPHONE HYDROCHLORIDE 1 MG: 1 INJECTION, SOLUTION INTRAMUSCULAR; INTRAVENOUS; SUBCUTANEOUS at 01:20

## 2017-03-19 RX ADMIN — ACETAMINOPHEN 650 MG: 325 TABLET ORAL at 12:19

## 2017-03-19 RX ADMIN — ASPIRIN 81 MG: 81 TABLET ORAL at 09:08

## 2017-03-19 RX ADMIN — METOPROLOL TARTRATE 12.5 MG: 25 TABLET, FILM COATED ORAL at 09:08

## 2017-03-19 RX ADMIN — ONDANSETRON HYDROCHLORIDE 4 MG: 2 SOLUTION INTRAMUSCULAR; INTRAVENOUS at 02:19

## 2017-03-19 RX ADMIN — ONDANSETRON HYDROCHLORIDE 4 MG: 2 SOLUTION INTRAMUSCULAR; INTRAVENOUS at 21:45

## 2017-03-19 RX ADMIN — ENOXAPARIN SODIUM 40 MG: 40 INJECTION SUBCUTANEOUS at 20:58

## 2017-03-19 RX ADMIN — SODIUM CHLORIDE 50 ML/HR: 9 INJECTION, SOLUTION INTRAVENOUS at 09:08

## 2017-03-19 RX ADMIN — OSELTAMIVIR PHOSPHATE 75 MG: 75 CAPSULE ORAL at 20:57

## 2017-03-19 RX ADMIN — METOPROLOL TARTRATE 25 MG: 25 TABLET, FILM COATED ORAL at 20:58

## 2017-03-19 RX ADMIN — HYDROCODONE BITARTRATE AND ACETAMINOPHEN 1 TABLET: 7.5; 325 TABLET ORAL at 20:58

## 2017-03-19 NOTE — PROGRESS NOTES
[facility] Medicine Services  INPATIENT PROGRESS NOTE    Length of Stay: 1  Date of Admission: 3/18/2017  Primary Care Physician: No Known Provider    Subjective   Chief Complaint: HA  HPI   She admitted after having syncopal episode ×2.  She states that she has had no other similar symptoms however currently just suffering from a headache.  She states she does get migraines does not take any current medications for this.  She tells me she has tried many different medications including seizure medicines, triptan's, narcotics and has had no relief, except with one seizure medication that she could not tolerate as it caused her to be too fatigued.  She is not followed by neurologist.  She also does not follow neurologist for her seizures which she tells me she has had a handful of times.  She currently is not having any chest pain or palpitations in her heart rate is improved after being started on a low-dose beta blocker by cardiology.        Review of Systems     All pertinent negatives and positives are as above. All other systems have been reviewed and are negative unless otherwise stated.     Objective    Temp:  [97.2 °F (36.2 °C)-98.5 °F (36.9 °C)] 97.2 °F (36.2 °C)  Heart Rate:  [] 76  Resp:  [16-18] 18  BP: ()/(52-98) 132/85  Physical Exam   Constitutional: She is oriented to person, place, and time. She appears well-developed and well-nourished.   HENT:   Head: Normocephalic and atraumatic.   Eyes: Conjunctivae and EOM are normal. Pupils are equal, round, and reactive to light.   Neck: Neck supple. No JVD present. No thyromegaly present.   Cardiovascular: Normal rate, regular rhythm, normal heart sounds and intact distal pulses.  Exam reveals no gallop and no friction rub.    No murmur heard.  Pulmonary/Chest: Effort normal and breath sounds normal. No respiratory distress. She has no wheezes. She has no rales. She exhibits no tenderness.   Abdominal: Soft.  Bowel sounds are normal. She exhibits no distension. There is no tenderness. There is no rebound and no guarding.   Musculoskeletal: Normal range of motion. She exhibits no edema, tenderness or deformity.   Lymphadenopathy:     She has no cervical adenopathy.   Neurological: She is alert and oriented to person, place, and time. She displays normal reflexes. No cranial nerve deficit. She exhibits normal muscle tone.   Skin: Skin is warm and dry. No rash noted.   Psychiatric: She has a normal mood and affect. Her behavior is normal. Judgment and thought content normal.           Results Review:  I have reviewed the labs, radiology results, and diagnostic studies.    Laboratory Data:     Results from last 7 days  Lab Units 03/19/17  0402 03/19/17  0012 03/18/17  1826   WBC 10*3/mm3 6.19 7.76 10.26   HEMOGLOBIN g/dL 13.8 14.2 14.6   HEMATOCRIT % 42.0 42.6 43.7   PLATELETS 10*3/mm3 322 339 412*          Results from last 7 days  Lab Units 03/19/17  0402 03/18/17  1826   SODIUM mmol/L 138 136   POTASSIUM mmol/L 4.3 4.3   CHLORIDE mmol/L 107 102   TOTAL CO2 mmol/L 24.0 19.0*   BUN mg/dL 14 19   CREATININE mg/dL 0.84 1.04   CALCIUM mg/dL 9.8 10.2   BILIRUBIN mg/dL  --  0.6   ALK PHOS U/L  --  153*   ALT (SGPT) U/L  --  73*   AST (SGOT) U/L  --  48*   GLUCOSE mg/dL 116* 138*       Culture Data:        Radiology Data:   Imaging Results (last 24 hours)     Procedure Component Value Units Date/Time    XR Chest 1 View [[id number]] Collected:  03/18/17 1843     Updated:  03/18/17 1941    Narrative:       EXAMINATION:   XR CHEST 1 VW-  3/18/2017 6:42 PM CDT     HISTORY: Cough, tachycardia     Single view of the chest is obtained. The lungs are clear. Cardiac  silhouette is normal. Cardiac monitoring leads are present.       Impression:       Negative single view chest.  This report was finalized on 03/18/2017 19:38 by Dr. Faraz Browning MD.    CT Angiogram Chest With Contrast [[id number]] Collected:  03/18/17 2024     Updated:   03/18/17 2111    Narrative:       EXAMINATION:   CT ANGIOGRAM CHEST W CONTRAST-  3/18/2017 8:19 PM CDT CT  chest angiogram dated 3/18/2017 7:58 PM CDT      HISTORY: SVT     COMPARISON: None.      DLP: 803 mGy cm. Automated exposure control was also utilized to  decrease patient radiation dose.     TECHNIQUE: Helical tomographic images of the chest were obtained after  the administration of intravenous contrast following angiogram protocol.  Additionally, 3D MIP reconstructions in the coronal and sagittal planes  were provided.        FINDINGS:    The imaged portion of the base of the neck appears unremarkable.      There is adequate enhancement of the pulmonary arteries to evaluate for  central and segmental pulmonary emboli. There are no filling defects  within the main, lobar, segmental or visualized subsegmental pulmonary  arteries. The pulmonary vessels are within normal limits.      The lungs are clear without pleural effusion, consolidation, nodule, or  mass lesion. The trachea and bronchial tree are patent.      The heart and great vessels appear unremarkable. There is no pericardial  effusion.      No enlarged axillary or mediastinal lymph nodes are present.      The osseous structures of the thorax and surrounding soft tissues  demonstrate no acute process.      Surgical clips are present and associated with the liver. There may be  an adenoma associated with the right adrenal gland.        Impression:       1. No evidence of embolic disease.  2. Suspicious for adenoma associated with the right adrenal gland.     This report was finalized on 03/18/2017 21:07 by Dr. Faraz Browning MD.          I have reviewed the patient current medications.     Assessment/Plan     Hospital Problem List     PSVT (paroxysmal supraventricular tachycardia)          1. Syncope. -possibly related to SVT. Other imaging and workup in progress  2. SVT. Started on low dose bb per cards. HR currently controlled.   3. Seizure disorder.  Not on any AED. States she has had  A few seizures ever and never followed by a neurologist   4. Migraine headache. Tells me she has tried multiple meds even AEDs, imitrex, narcotics, with no help. Currently has a HA. Can speak with dr oakes about other options. BB may help some. dont really want to give caffeine meds in setting of SVT  5. Elevated liver function tests of uncertain etiology. Follow up CMP in am  6. Hypertension.   7. Mildly elevated troponin-likely related to SVT. Trending down.              Discharge Planning: I expect patient to be discharged to  in 1 days.    Ila Garza MD   03/19/17   8:31 AM

## 2017-03-19 NOTE — ED PROVIDER NOTES
Subjective   History of Present Illness    Review of Systems    Past Medical History   Diagnosis Date   • Disease of thyroid gland    • Migraines        Allergies   Allergen Reactions   • Sulfa Antibiotics Anaphylaxis       Past Surgical History   Procedure Laterality Date   • Liver resection     •  section     • Thyroid surgery         History reviewed. No pertinent family history.    Social History     Social History   • Marital status:      Spouse name: N/A   • Number of children: N/A   • Years of education: N/A     Social History Main Topics   • Smoking status: Current Every Day Smoker     Packs/day: 0.50   • Smokeless tobacco: None   • Alcohol use No   • Drug use: No   • Sexual activity: Defer     Other Topics Concern   • None     Social History Narrative   • None           Objective   Physical Exam    Procedures         ED Course  ED Course   Comment By Time   Patient was given 6 mg of adenosine which slowed her heart rate did not converted.  We then repeated with 12 mg of adenosine and she converted to a normal sinus rhythm with a rate of around 100 at that time and began feeling much better.  Were now waiting on the workup.  She will be turned over to Dr. Shaffer. Jamel Carrion Jr., MD  183   Suspicious for adenoma associated with the right adrenal gland.     Pt was informed  Calos Shaffer MD 2047   Sinus rhythm with nonspecific ST-T wave changes Calos Shaffer MD 2203   Patient came in PSVT provided and was seen repeat cardiac markers are elevated she needs observation in  the hospital Calos Shaffer MD 2204   Dr Gutierrez wanted a flu swab he will follow up on results  Calos Shaffer MD  221   Patient is complaining of headache Which is something chronic like her usual migraine headaches Calos Shaffer MD  2218    The patient's symptoms are now better.  Patient is not having pain.  No chest pain, difficulty breathing, nausea, vomiting or palpitations.  No  anxiety or dizziness.  Vital signs have been reviewed and appear to be correct.  Physical exam findings are improved.  Alert.  Oriented X3 .  No acute distress.  Breath sounds normal.  No respiratory distress, decreased breath sounds or wheezes.  Normal heart rate and rhythm.  Heart sounds normal.  .  Abdomen soft and nontender.  No abdominal tenderness or guarding or rebound tenderness.  Skin warm and dry.  No cyanosis or diaphoresis.  Oriented X 3.  Not anxious.  No alteration in mental status or weakness.         Calos Shaffer MD 03/18 2218                  [facility]    Final diagnoses:   PSVT (paroxysmal supraventricular tachycardia)   Elevated troponin   Chronic migraine without aura without status migrainosus, not intractable   Adrenal adenoma, right            Calos Shaffer MD  03/18/17 2211       Calos Shaffer MD  03/18/17 2222

## 2017-03-19 NOTE — CONSULTS
Neurology Consult Note    Referring Provider: Dr. Garza  Reason for Consultation: syncope      History of present illness:      This is a 51-year-old right-handed obese  female who presents with a case of syncope.  She was walking back from the mailbox yesterday with no preceding symptoms and lost consciousness suddenly.  She did fall to the ground.  Her loss of consciousness lasted only approximately 15-30 seconds.  There was no tongue biting and no incontinence.  She woke almost immediately with no postictal state.  There was no witnessed seizure activity.  She does feel as if she has some tachycardia that day.  Since being admitted there has been other signs tachycardia as well.    She reports that for the past 4-5 years she has had other spells associated with passing out with a severe bilateral simple headache.  When she passes out there has been some tremulous activity of unclear duration.  There is been no tongue biting and no incontinence associated with those.  She has no family history of seizure disorder and has no history of previous head trauma.  She has never been diagnosed with a seizure disorder.  She is never been on an antiepileptic for seizures but has been tried on multiple antiepileptics in the past secondary to medication refractory migraines.  Example that she is tried Topamax in the past but had cognitive slowing and therefore did not tolerated.    He has almost daily headaches that are holocephalic in nature with light sensitivity.  Past Medical History   Diagnosis Date   • Disease of thyroid gland    • Migraines    • Seizures    • Stroke    • TIA (transient ischemic attack)        Allergies   Allergen Reactions   • Sulfa Antibiotics Anaphylaxis   • Latex      No current facility-administered medications on file prior to encounter.      Current Outpatient Prescriptions on File Prior to Encounter   Medication Sig   • HYDROcodone-acetaminophen (NORCO) 7.5-325 MG per tablet Take 1  tablet by mouth Every 4 (Four) Hours As Needed for moderate pain (4-6).   • oseltamivir (TAMIFLU) 75 MG capsule Take 1 capsule by mouth 2 (Two) Times a Day.       Social History     Social History   • Marital status:      Spouse name: N/A   • Number of children: N/A   • Years of education: N/A     Occupational History   • Not on file.     Social History Main Topics   • Smoking status: Current Every Day Smoker     Packs/day: 0.50   • Smokeless tobacco: Not on file   • Alcohol use No   • Drug use: No   • Sexual activity: Defer     Other Topics Concern   • Not on file     Social History Narrative     History reviewed. No pertinent family history.    Review of Systems  A 14 point review of systems was reviewed and was negative except for syncope and headache    Vital Signs   Temp:  [97.2 °F (36.2 °C)-98.5 °F (36.9 °C)] 97.2 °F (36.2 °C)  Heart Rate:  [] 76  Resp:  [16-18] 18  BP: ()/(52-98) 132/85    General Exam:  Head:  Normal cephalic, atraumatic  HEENT:  Neck supple  Fundoscopic Exam:  No signs of disc edema  CVS:  Regular rate and rhythm.  No murmurs  Carotid Examination:  No bruits  Lungs:  Clear to auscultation  Abdomen:  Non-tender, Non-distended  Extremities:  No signs of peripheral edema  Skin:  No rashes    Neurologic Exam:    Mental Status:    -Awake, Alert, Oriented X 3  -No word finding difficulties  -No aphasia  -No dysarthria  -Follows simple and complex commands    CN II:  Visual fields full.  Pupils equally reactive to light  CN III, IV, VI:  Extraocular Muscles full with no signs of nystagmus  CN V:  Facial sensory is symmetric with no asymetries.  CN VII:  Facial motor symmetric  CN VIII:  Gross hearing intact bilaterally  CN IX:  Palate elevates symmetrically  CN X:  Palate elevates symmetrically  CN XI:  Shoulder shrug symmetric  CN XII:  Tongue is midline on protrusion    Motor: (strength out of 5:  1= minimal movement, 2 = movement in plane of gravity, 3 = movement against  gravity, 4 = movement against some resistance, 5 = full strength)    -Right Upper Ext: Proximal: 5 Distal: 5  -Left Upper Ext: Proximal: 5 Distal: 5    -Right Lower Ext: Proximal: 5 Distal: 5  -Left Lower Ext: Proximal: 5 Distal: 5    DTR:  -Right   Bicep: 2+ Tricep: 2+ Brachoradialis: 2+   Patella: 2+ Ankle: 2+ Neg Babinski  -Left   Bicep: 2+ Tricep: 2+ Brachoradialis: 2+   Patella: 2+ Ankle: 2+ Neg Babinski    Sensory:  -Intact to light touch, pinprick, temperature, pain, and proprioception    Coordination:  -Finger to nose intact  -Heel to shin intact  -No ataxia    Gait  -No signs of ataxia  -ambulates unassisted      Results Review:  Lab Results (last 24 hours)     Procedure Component Value Units Date/Time    POC Troponin, Rapid [[id number]]  (Normal) Collected:  03/18/17 1835    Specimen:  Blood Updated:  03/18/17 1850     Troponin I 0.00 ng/mL       Serial Number: [id number]    : [id number]       D-dimer, Quantitative [[id number]]  (Abnormal) Collected:  03/18/17 1826    Specimen:  Blood Updated:  03/18/17 1944     D-Dimer, Quantitative 1.11 (H) mg/L (FEU)     Narrative:       Reference Range is 0-0.50 mg/L FEU. However, results <0.50 mg/L FEU tends to rule out DVT or PE. Results >0.50 mg/L FEU are not useful in predicting absence or presence of DVT or PE.    Comprehensive Metabolic Panel [[id number]]  (Abnormal) Collected:  03/18/17 1826    Specimen:  Blood Updated:  03/18/17 1944     Glucose 138 (H) mg/dL      BUN 19 mg/dL      Creatinine 1.04 mg/dL      Sodium 136 mmol/L      Potassium 4.3 mmol/L      Chloride 102 mmol/L      CO2 19.0 (L) mmol/L      Calcium 10.2 mg/dL      Total Protein 7.1 g/dL      Albumin 4.00 g/dL      ALT (SGPT) 73 (H) U/L      AST (SGOT) 48 (H) U/L      Alkaline Phosphatase 153 (H) U/L      Total Bilirubin 0.6 mg/dL      eGFR Non African Amer 55 (L) mL/min/1.73      Globulin 3.1 gm/dL      A/G Ratio 1.3 g/dL      BUN/Creatinine Ratio 18.3      Anion Gap 15.0 (H) mmol/L     BNP  [[id number]]  (Normal) Collected:  03/18/17 1826    Specimen:  Blood Updated:  03/18/17 1944     proBNP 69.1 pg/mL     CBC & Differential [[id number]] Collected:  03/18/17 1826    Specimen:  Blood Updated:  03/18/17 1959    Narrative:       The following orders were created for panel order CBC & Differential.  Procedure                               Abnormality         Status                     ---------                               -----------         ------                     CBC Auto Differential[[id number]]         Abnormal            Final result                 Please view results for these tests on the individual orders.    CBC Auto Differential [[id number]]  (Abnormal) Collected:  03/18/17 1826    Specimen:  Blood Updated:  03/18/17 1959     WBC 10.26 10*3/mm3      RBC 4.88 10*6/mm3      Hemoglobin 14.6 g/dL      Hematocrit 43.7 %      MCV 89.5 fL      MCH 29.9 pg      MCHC 33.4 g/dL      RDW 14.1 %      RDW-SD 46.1 fl      MPV 10.8 fL      Platelets 412 (H) 10*3/mm3      Neutrophil % 57.8 %      Lymphocyte % 30.4 %      Monocyte % 9.2 %      Eosinophil % 1.8 %      Basophil % 0.4 %      Immature Grans % 0.4 %      Neutrophils, Absolute 5.94 10*3/mm3      Lymphocytes, Absolute 3.12 10*3/mm3      Monocytes, Absolute 0.94 10*3/mm3      Eosinophils, Absolute 0.18 10*3/mm3      Basophils, Absolute 0.04 10*3/mm3      Immature Grans, Absolute 0.04 (H) 10*3/mm3     Ethanol [[id number]]  (Normal) Collected:  03/18/17 1826    Specimen:  Blood Updated:  03/18/17 2002     Ethanol % <0.010 %     Narrative:       Not for legal purposes. Chain of Custody not followed.     TSH [[id number]]  (Normal) Collected:  03/18/17 1826    Specimen:  Blood Updated:  03/18/17 2006     TSH 2.980 mIU/mL     Urine Drug Screen [[id number]]  (Normal) Collected:  03/18/17 2001    Specimen:  Urine from Urine, Clean Catch Updated:  03/18/17 2106     Amphetamine Screen, Urine Negative      Barbiturates Screen, Urine Negative      Benzodiazepine Screen,  Urine Negative      Cocaine Screen, Urine Negative      Methadone Screen, Urine Negative      Opiate Screen Negative      Phencyclidine (PCP), Urine Negative      THC, Screen, Urine Negative     Narrative:       Negative Thresholds For Drugs Screened in Urine:    Amphetamines          500 ng/ml  Barbiturates          200 ng/ml  Benzodiazepines       200 ng/ml  Cocaine               150 ng/ml  Methadone             150 ng/ml  Opiates               300 ng/ml  Phencyclidine         25 ng/ml  THC                      50 ng/ml    The normal value for all drugs tested is negative. This report includes final unconfirmed screening results.  A positive result by this assay can be, at your request, sent to the Reference Lab for confirmation by gas chromatography. Unconfirmed results must not be used for non-medical purposes, such as employment or legal testing. Clinical consideration should be applied to any drug of abuse test result, particularly when unconfirmed results are used.    POC Troponin, Rapid [[id number]]  (Abnormal) Collected:  03/18/17 2111    Specimen:  Blood Updated:  03/18/17 2125     Troponin I 0.11 (H) ng/mL       Serial Number: [id number]    : [id number]       Influenza Antigen [[id number]]  (Normal) Collected:  03/18/17 2225    Specimen:  Swab from Nasopharynx Updated:  03/18/17 2240     Influenza A Ag, EIA Negative      Influenza B Ag, EIA Negative     Narrative:         Recommend confirmation of negative results by viral culture or molecular assay.    [place] Draw [[id number]] Collected:  03/18/17 1826    Specimen:  Blood Updated:  03/18/17 2302    Narrative:       The following orders were created for panel order [place] Draw.  Procedure                               Abnormality         Status                     ---------                               -----------         ------                     Light Blue Top[[id number]]                                    Final result               Green Top  (Gel)[[id number]]                                   Final result               Lavender Top[[id number]]                                      Final result               Red Top[[id number]]                                           Final result               Green Top (No Gel)[[id number]]                                                             Please view results for these tests on the individual orders.    Light Blue Top [[id number]] Collected:  03/18/17 1826    Specimen:  Blood Updated:  03/18/17 2302     Extra Tube hold for add-on       Auto resulted       Green Top (Gel) [[id number]] Collected:  03/18/17 1826    Specimen:  Blood Updated:  03/18/17 2302     Extra Tube Hold for add-ons.       Auto resulted.       Lavender Top [[id number]] Collected:  03/18/17 1826    Specimen:  Blood Updated:  03/18/17 2302     Extra Tube hold for add-on       Auto resulted       Red Top [[id number]] Collected:  03/18/17 1826    Specimen:  Blood Updated:  03/18/17 2302     Extra Tube Hold for add-ons.       Auto resulted.       CBC Auto Differential [[id number]]  (Normal) Collected:  03/19/17 0012    Specimen:  Blood Updated:  03/19/17 0022     WBC 7.76 10*3/mm3      RBC 4.76 10*6/mm3      Hemoglobin 14.2 g/dL      Hematocrit 42.6 %      MCV 89.5 fL      MCH 29.8 pg      MCHC 33.3 g/dL      RDW 14.2 %      RDW-SD 46.1 fl      MPV 9.9 fL      Platelets 339 10*3/mm3      Neutrophil % 64.5 %      Lymphocyte % 25.4 %      Monocyte % 7.2 %      Eosinophil % 2.3 %      Basophil % 0.3 %      Immature Grans % 0.3 %      Neutrophils, Absolute 5.01 10*3/mm3      Lymphocytes, Absolute 1.97 10*3/mm3      Monocytes, Absolute 0.56 10*3/mm3      Eosinophils, Absolute 0.18 10*3/mm3      Basophils, Absolute 0.02 10*3/mm3      Immature Grans, Absolute 0.02 10*3/mm3     Troponin [[id number]]  (Abnormal) Collected:  03/19/17 0013    Specimen:  Blood Updated:  03/19/17 0045     Troponin I 0.158 (H) ng/mL     CBC (No Diff) [[id number]]  (Abnormal) Collected:  03/19/17  0402    Specimen:  Blood Updated:  03/19/17 0416     WBC 6.19 10*3/mm3      RBC 4.67 10*6/mm3      Hemoglobin 13.8 g/dL      Hematocrit 42.0 %      MCV 89.9 fL      MCH 29.6 pg      MCHC 32.9 (L) g/dL      RDW 14.4 %      RDW-SD 47.0 fl      MPV 9.8 fL      Platelets 322 10*3/mm3     Basic Metabolic Panel [[id number]]  (Abnormal) Collected:  03/19/17 0402    Specimen:  Blood Updated:  03/19/17 0434     Glucose 116 (H) mg/dL      BUN 14 mg/dL      Creatinine 0.84 mg/dL      Sodium 138 mmol/L      Potassium 4.3 mmol/L      Chloride 107 mmol/L      CO2 24.0 mmol/L      Calcium 9.8 mg/dL      eGFR Non African Amer 71 mL/min/1.73      BUN/Creatinine Ratio 16.7      Anion Gap 7.0 mmol/L     Narrative:       GFR Normal >60  Chronic Kidney Disease <60  Kidney Failure <15    Troponin [[id number]]  (Abnormal) Collected:  03/19/17 0402    Specimen:  Blood Updated:  03/19/17 0438     Troponin I 0.130 (H) ng/mL     Troponin [[id number]]  (Abnormal) Collected:  03/19/17 1041    Specimen:  Blood Updated:  03/19/17 1126     Troponin I 0.070 (H) ng/mL           .  Imaging Results (last 24 hours)     Procedure Component Value Units Date/Time    XR Chest 1 View [[id number]] Collected:  03/18/17 1843     Updated:  03/18/17 1941    Narrative:       EXAMINATION:   XR CHEST 1 VW-  3/18/2017 6:42 PM CDT     HISTORY: Cough, tachycardia     Single view of the chest is obtained. The lungs are clear. Cardiac  silhouette is normal. Cardiac monitoring leads are present.       Impression:       Negative single view chest.  This report was finalized on 03/18/2017 19:38 by Dr. Faraz Browning MD.    CT Angiogram Chest With Contrast [[id number]] Collected:  03/18/17 2024     Updated:  03/18/17 2111    Narrative:       EXAMINATION:   CT ANGIOGRAM CHEST W CONTRAST-  3/18/2017 8:19 PM CDT CT  chest angiogram dated 3/18/2017 7:58 PM CDT      HISTORY: SVT     COMPARISON: None.      DLP: 803 mGy cm. Automated exposure control was also utilized to  decrease patient  radiation dose.     TECHNIQUE: Helical tomographic images of the chest were obtained after  the administration of intravenous contrast following angiogram protocol.  Additionally, 3D MIP reconstructions in the coronal and sagittal planes  were provided.        FINDINGS:    The imaged portion of the base of the neck appears unremarkable.      There is adequate enhancement of the pulmonary arteries to evaluate for  central and segmental pulmonary emboli. There are no filling defects  within the main, lobar, segmental or visualized subsegmental pulmonary  arteries. The pulmonary vessels are within normal limits.      The lungs are clear without pleural effusion, consolidation, nodule, or  mass lesion. The trachea and bronchial tree are patent.      The heart and great vessels appear unremarkable. There is no pericardial  effusion.      No enlarged axillary or mediastinal lymph nodes are present.      The osseous structures of the thorax and surrounding soft tissues  demonstrate no acute process.      Surgical clips are present and associated with the liver. There may be  an adenoma associated with the right adrenal gland.        Impression:       1. No evidence of embolic disease.  2. Suspicious for adenoma associated with the right adrenal gland.     This report was finalized on 03/18/2017 21:07 by Dr. Faraz Browning MD.              Impression    1.  Nonneurologic syncope  2.  No history of a formal diagnosis of epilepsy but subjective history of tremulous spells associated with syncope previously  3.  Medication refractory migraines    Plan    1.  No further neurologic workup for syncope spell that occurred yesterday as I do not believe that this is neurologic.  2.  Will need an EEG and MR of brain with and without contrast to be done either while she is here or as an outpatient  3.  Follow-up in outpatient clinic for further workup of previous spells in addition to the possibility of botulinum toxin injection for  medication refractory migraines.    I discussed the patients findings and my recommendations with patient    Nam Langley MD  03/19/17  2:11 PM

## 2017-03-19 NOTE — PLAN OF CARE
Problem: Patient Care Overview (Adult)  Goal: Plan of Care Review  Outcome: Ongoing (interventions implemented as appropriate)    03/19/17 1672   Outcome Evaluation   Outcome Summary/Follow up Plan cardiology consult, echo, neurology consult for untreated seizure disorder. meds for migraine with relief. nsr 76-70

## 2017-03-19 NOTE — PLAN OF CARE
Problem: Patient Care Overview (Adult)  Goal: Plan of Care Review  Outcome: Ongoing (interventions implemented as appropriate)    03/19/17 8220   Outcome Evaluation   Outcome Summary/Follow up Plan Dr. Aguirre saw patient and increased Lopressor, also added IV Lopressor PRN for SVT. Dr. Langley saw patient and ordered MRI and EEG. Patient still has headache, with Tylenol ordered with mild relief. No other complaints/ Will continue to monitor. S 75-90   Coping/Psychosocial Response Interventions   Plan Of Care Reviewed With patient   Patient Care Overview   Progress progress toward functional goals as expected       Goal: Adult Individualization and Mutuality  Outcome: Ongoing (interventions implemented as appropriate)  Goal: Discharge Needs Assessment  Outcome: Ongoing (interventions implemented as appropriate)    Problem: Arrhythmia/Dysrhythmia (Symptomatic) (Adult)  Goal: Signs and Symptoms of Listed Potential Problems Will be Absent or Manageable (Arrhythmia/Dysrhythmia)  Outcome: Ongoing (interventions implemented as appropriate)

## 2017-03-19 NOTE — H&P
"   TIME: 2:03 a.m.     HISTORY OF PRESENT ILLNESS: Ms. Small is a 51-year-old  female who presents to [facility] due to \"I blacked out.\" Ms. Small relates that she walked to her mailbox this morning and upon returning to the house fell to the ground unconscious for \"a few seconds.\" She then aroused herself, walked back to the house and then had a similar episode 2 hours later. After another period of time, her  insisted that she come to the hospital for evaluation. Ms. Small relates that she was recently diagnosed with influenza. She was prescribed Tamiflu; however, she has not been using the medication as prescribed. In addition to syncope, she also relates having a poor appetite over the past few days. This evening she had an episode of what she describes as significant chest palpitations.  In the emergency department, she had an EKG consistent with SVT. She relates recurring difficulties with shortness of breath over the past few days. After admission, she also related experiencing intense abdominal pain; however, the abdominal pain lasted only for a few seconds, and she is not able to elaborate further. She relates having nausea, but no vomiting. In addition to the above, she also relates a severe headache. Apparently she has a history of migraine headache. Of note, she also has a history of seizure disorder; however, she has not been treated for such. It is my understanding that many of her problems were diagnosed in [place], and she and her family have moved around quite a bit over the past few yeasr and she has not had any sort of regular medical followup. Presently, she is resting comfortably, but relates ongoing headache.    REVIEW OF SYSTEMS: Otherwise unremarkable from a cardiovascular, pulmonary, gastrointestinal, genitourinary, neurologic, psychiatric, metabolic, and constitutional standpoint except as noted. She relates recent fatigue and weakness. She has had no fevers, " chills or sweats. She has had a poor appetite. Her weight is stable. She has had no chest pain, but relates chest palpitations earlier this evening. She has had shortness of breath and lower extremity edema. She has had no orthopnea or cough. She has had wheezing. She has had no hemoptysis. She relates a brief episode of severe abdominal pain, which is now resolved completely. She has had nausea, but no vomiting. She has had no diarrhea, but has difficulties with chronic constipation. She has had no dysphagia or odynophagia. She has had no hematemesis, hematochezia or melena. She has had no flank pain, pelvic pain, hematuria or dysuria. Note, the patient relates a recent history of dysuria which she attributes to a urinary tract infection. She has had no skin rashes, arthralgias, myalgias or swollen joints. She has had a severe ongoing and generalized headache that has been present since earlier yesterday. She has had no confusion or memory deficits. She relates at least 2 episodes of syncope. She has had no recent changes in her vision or hearing. She does relate that her hearing seems worse since being diagnosed with influenza. She has had no acute motor or sensory deficits. She has had no tremors or gait deficits. She does relate experiencing some dizziness. As noted, she had a fall yesterday morning.      PAST MEDICAL HISTORY:  1.  Hypertension.   2.  TIA.   3.  Seizure disorder (not treated).   4.  Large hepatic hemangioma.   5.  Obesity.   6.  Migraine headache.   7.  Gastroesophageal reflux disease.   8.  Hiatal hernia.   9.  Eosinophilic esophagitis.   10. Chronic constipation.   11. Medical noncompliance.     PAST SURGICAL HISTORY:  1.  Status post  on 2 occasions.   2.  Status post pilonidal cyst resection.   3.  Status post liver resection due to large hemangioma.   4.  Status post parathyroidectomy.   5.  Status post EGD.   6.  Status post esophageal stricture.     The patient specifically  denies any history of psychiatric difficulties such as anxiety, depression, ADHD, bipolar, schizophrenia or substance abuse.     ALLERGIES:   1.  SULFA.  2.  LATEX.      HOME MEDICATIONS:  1.  Norco 7.5 p.o. q.4 h. p.r.n. for pain.  2.  Tamiflu 75 mg p.o. b.i.d.     SOCIAL HISTORY: Significant for being a resident of [place], [place]. She is . She has a son and 2 daughters reported to be in good health. She is not employed. She has a high school education. She attended vocational school to study . She smokes 3-4 cigarettes per day. She drinks beer on occasions, but not regularly. She denies any history of illicit drug use. She is Shinto. She has no recent history of travel outside of this region.     Her   will serve as a surrogate for healthcare matters should such become necessary.     CODE STATUS: She is a FULL CODE.     FAMILY HISTORY: Significant for having a brother and sister reported to be in good health, although her brother has a history of heart disease of uncertain type. Her mother  at 52 years of age due to liver cancer. Her father is living and has a history of COPD.     PHYSICAL EXAMINATION:    VITAL SIGNS: Temperature is 97.8, pulse is 88, respirations are 18 and unlabored, blood pressure is 133/97, O2 saturation is 97% breathing ambient air. Weight is 252 pounds.     GENERAL: This is a 51-year-old  female appearing her documented age. She appears to be resting comfortably in bed. She has a towel over her eyes. She relates ongoing headache. She is interactive and cooperative. She proves to be a fairly good historian, although elements of her history are fragmented. No family members are present.     HEAD AND NECK: Essentially unremarkable except as noted. I see no signs of acute trauma. Nose and throat are essentially unremarkable. There is no discharge from the nostrils. Mucous membranes are moist. Neck appears supple. She has no cervical or  clavicular adenopathy. She has no definite carotid bruits. There are no masses of the head or neck. Neck veins do not appear pathologically distended.     CARDIAC: Reveals S1 and S2 with a regular rhythm. She has no definite murmurs, rubs or gallops.     LUNGS: Reveals bilateral breath sounds that are clear to auscultation throughout. She has no rales, wheezes or rhonchi.     ABDOMEN: Reveals bowel sounds to be present. Her abdomen is nontender, nondistended, soft, and obese.     EXTREMITIES: No significant lower extremity edema, erythema or calf tenderness.     NEUROLOGIC: Reveals the patient to be awake and alert. Cranial nerves II-XII appear grossly intact. She exhibits no definite focal, motor or sensory deficits. She seems able to move all extremities without difficulty and at will. She is able to reposition herself in bed without assistance. Her gait was not tested.     PSYCHIATRIC: Reveals her mood to be stable. Affect is flat. Thought processes are organized, in that she is able to answer questions appropriately and provide a history, although the history is somewhat fragmented. Speech seems fluent. There is no flight of ideas. There are no obvious short-term or long-term memory deficits.     DIAGNOSTIC DATA:   1.  A complete metabolic panel demonstrates a sodium of 136, potassium 4.3, chloride 102, CO2 of 19, BUN 19, creatinine 1.04, glucose 138, total calcium 10.2.     2.  Liver function testing demonstrates an ALT of 73, AST 48, alkaline phosphatase level of 153.     3.  CBC is unremarkable.     4.  Influenza screen is negative.     5.  Urine drug screen is negative.     6.  ProBNP is 69.1.     7.  TSH is 2.98.     8.  D-dimer is 1.11.     9.  Initial troponin level is 0.11 with a followup troponin level of 0.158.     10. Chest x-ray demonstrates no acute abnormalities.     11. CTA of the chest demonstrates no evidence of embolic disease. There is some suspicion of an adenoma associated with the right  adrenal gland.     12. EKG demonstrates SVT at the rate of 197 beats per minute.     13. Followup EKG demonstrates a sinus rhythm of 91 beats per minute.     13. A 3rd EKG demonstrates sinus rhythm of 84 beats per minute.     IMPRESSION:  1.  Syncope.   2.  SVT.   3.  Seizure disorder.   4.  Migraine headache.   5.  Elevated liver function tests of uncertain etiology.   6.  Hypertension.     PLAN:   1.  At this time, Ms. Small will be admitted to [facility] for further evaluation and treatment. Her admitting diagnoses are as noted. Her condition at this time is judged to be stable. She will be placed on telemetry.     2.  I have asked the nursing staff to obtain vital signs per protocol. She will be confined to bedrest with bathroom privileges with assistance. Her allergies to sulfa and latex are noted. I have asked the nursing staff to monitor input and output. Daily weights will obtained. Neurologic checks to be obtained every 4 hours. She will be maintained on a regular diet. IV fluids will consist of normal saline at 50 mL/h. Oxygen will be used as needed to maintain her O2 saturation greater than 92%. She is a FULL CODE. Fall precautions are to be instituted. Likewise, seizure precautions are to be instituted.     3.  I will consult the neurology service.     4.  I will obtain followup laboratory studies in the morning.     5.  I will make an effort to obtain her medical records from [place]. Note, the patient is able to recall the name of her physician in [place]. She relates that she was hospitalized at [facility] in [place], [place].     ADMITTING MEDICATIONS:   1.  Lovenox 40 mg subcutaneous daily.   2.  Metoprolol 12.5 mg p.o. b.i.d.   3.  Tamiflu 75 mg p.o. b.i.d.   4.  Protonix 40 mg p.o. daily.   5.  Tylenol 650 mg p.o. q.6 h. p.r.n. for fever and/or discomfort.   6.  Norco 7.5 mg 1 p.o. q.4 h. p.r.n. for pain.   7.  DuoNeb 1 unit q.4 h. p.r.n. for shortness of breath and/or  wheezing.   8.  Zofran 4 mg IV q.6 h. p.r.n. for nausea and vomiting.     I will continue to follow Ms. Small closely through the night pending return of the hospitalist team in the morning. The nursing staff may call should they have any questions or concerns. Please refer to the medical record for additional information, orders and/or comments.       cc:       CARMEN Bonner/[id number]  D:  03/19/2017 03:20:12(Eastern Time)  T:  03/19/2017 07:53:08(Eastern Time)  Voice ID:  [id number]/Document ID:  [id number]

## 2017-03-20 ENCOUNTER — APPOINTMENT (OUTPATIENT)
Dept: NEUROLOGY | Facility: HOSPITAL | Age: 52
End: 2017-03-20
Attending: PSYCHIATRY & NEUROLOGY

## 2017-03-20 ENCOUNTER — APPOINTMENT (OUTPATIENT)
Dept: MRI IMAGING | Facility: HOSPITAL | Age: 52
End: 2017-03-20

## 2017-03-20 ENCOUNTER — APPOINTMENT (OUTPATIENT)
Dept: CARDIOLOGY | Facility: HOSPITAL | Age: 52
End: 2017-03-20
Attending: INTERNAL MEDICINE

## 2017-03-20 VITALS
RESPIRATION RATE: 18 BRPM | TEMPERATURE: 98.1 F | DIASTOLIC BLOOD PRESSURE: 89 MMHG | BODY MASS INDEX: 44.44 KG/M2 | HEIGHT: 63 IN | SYSTOLIC BLOOD PRESSURE: 125 MMHG | OXYGEN SATURATION: 96 % | WEIGHT: 250.8 LBS | HEART RATE: 85 BPM

## 2017-03-20 LAB
BH CV ECHO MEAS - AO MAX PG (FULL): 1.2 MMHG
BH CV ECHO MEAS - AO MAX PG: 3.9 MMHG
BH CV ECHO MEAS - AO MEAN PG (FULL): 0 MMHG
BH CV ECHO MEAS - AO MEAN PG: 2 MMHG
BH CV ECHO MEAS - AO ROOT AREA (BSA CORRECTED): 1.6
BH CV ECHO MEAS - AO ROOT AREA: 9.6 CM^2
BH CV ECHO MEAS - AO ROOT DIAM: 3.5 CM
BH CV ECHO MEAS - AO V2 MAX: 99.2 CM/SEC
BH CV ECHO MEAS - AO V2 MEAN: 69.3 CM/SEC
BH CV ECHO MEAS - AO V2 VTI: 20.9 CM
BH CV ECHO MEAS - AVA(I,A): 2.6 CM^2
BH CV ECHO MEAS - AVA(I,D): 2.6 CM^2
BH CV ECHO MEAS - AVA(V,A): 2.6 CM^2
BH CV ECHO MEAS - AVA(V,D): 2.6 CM^2
BH CV ECHO MEAS - BSA(HAYCOCK): 2.3 M^2
BH CV ECHO MEAS - BSA: 2.1 M^2
BH CV ECHO MEAS - BZI_BMI: 44.3 KILOGRAMS/M^2
BH CV ECHO MEAS - BZI_METRIC_HEIGHT: 160 CM
BH CV ECHO MEAS - BZI_METRIC_WEIGHT: 113.4 KG
BH CV ECHO MEAS - CONTRAST EF 4CH: 60.5 ML/M^2
BH CV ECHO MEAS - EDV(CUBED): 84 ML
BH CV ECHO MEAS - EDV(MOD-SP4): 88.9 ML
BH CV ECHO MEAS - EDV(TEICH): 86.8 ML
BH CV ECHO MEAS - EF(CUBED): 73.6 %
BH CV ECHO MEAS - EF(MOD-SP4): 60.5 %
BH CV ECHO MEAS - EF(TEICH): 65.6 %
BH CV ECHO MEAS - ESV(CUBED): 22.2 ML
BH CV ECHO MEAS - ESV(MOD-SP4): 35.1 ML
BH CV ECHO MEAS - ESV(TEICH): 29.8 ML
BH CV ECHO MEAS - FS: 35.8 %
BH CV ECHO MEAS - IVS/LVPW: 0.99
BH CV ECHO MEAS - IVSD: 1.1 CM
BH CV ECHO MEAS - LA DIMENSION: 3.3 CM
BH CV ECHO MEAS - LA/AO: 0.94
BH CV ECHO MEAS - LAT PEAK E' VEL: 7.1 CM/SEC
BH CV ECHO MEAS - LV DIASTOLIC VOL/BSA (35-75): 41.8 ML/M^2
BH CV ECHO MEAS - LV MASS(C)D: 173.2 GRAMS
BH CV ECHO MEAS - LV MASS(C)DI: 81.4 GRAMS/M^2
BH CV ECHO MEAS - LV MAX PG: 2.7 MMHG
BH CV ECHO MEAS - LV MEAN PG: 2 MMHG
BH CV ECHO MEAS - LV SYSTOLIC VOL/BSA (12-30): 16.5 ML/M^2
BH CV ECHO MEAS - LV V1 MAX: 82.8 CM/SEC
BH CV ECHO MEAS - LV V1 MEAN: 57.7 CM/SEC
BH CV ECHO MEAS - LV V1 VTI: 17.4 CM
BH CV ECHO MEAS - LVIDD: 4.4 CM
BH CV ECHO MEAS - LVIDS: 2.8 CM
BH CV ECHO MEAS - LVLD AP4: 8 CM
BH CV ECHO MEAS - LVLS AP4: 7.2 CM
BH CV ECHO MEAS - LVOT AREA (M): 3.1 CM^2
BH CV ECHO MEAS - LVOT AREA: 3.1 CM^2
BH CV ECHO MEAS - LVOT DIAM: 2 CM
BH CV ECHO MEAS - LVPWD: 1.1 CM
BH CV ECHO MEAS - MED PEAK E' VEL: 6.85 CM/SEC
BH CV ECHO MEAS - MV A MAX VEL: 64 CM/SEC
BH CV ECHO MEAS - MV DEC TIME: 0.23 SEC
BH CV ECHO MEAS - MV E MAX VEL: 68.9 CM/SEC
BH CV ECHO MEAS - MV E/A: 1.1
BH CV ECHO MEAS - RAP SYSTOLE: 10 MMHG
BH CV ECHO MEAS - RVSP: 32.3 MMHG
BH CV ECHO MEAS - SI(AO): 94.6 ML/M^2
BH CV ECHO MEAS - SI(CUBED): 29.1 ML/M^2
BH CV ECHO MEAS - SI(LVOT): 25.7 ML/M^2
BH CV ECHO MEAS - SI(MOD-SP4): 25.3 ML/M^2
BH CV ECHO MEAS - SI(TEICH): 26.8 ML/M^2
BH CV ECHO MEAS - SV(AO): 201.1 ML
BH CV ECHO MEAS - SV(CUBED): 61.8 ML
BH CV ECHO MEAS - SV(LVOT): 54.7 ML
BH CV ECHO MEAS - SV(MOD-SP4): 53.8 ML
BH CV ECHO MEAS - SV(TEICH): 56.9 ML
BH CV ECHO MEAS - TR MAX VEL: 236 CM/SEC
LEFT ATRIUM VOLUME INDEX: 16.2 ML/M2
LEFT ATRIUM VOLUME: 34.4 CM3

## 2017-03-20 PROCEDURE — 93306 TTE W/DOPPLER COMPLETE: CPT

## 2017-03-20 PROCEDURE — 93306 TTE W/DOPPLER COMPLETE: CPT | Performed by: INTERNAL MEDICINE

## 2017-03-20 PROCEDURE — 95816 EEG AWAKE AND DROWSY: CPT

## 2017-03-20 PROCEDURE — 95816 EEG AWAKE AND DROWSY: CPT | Performed by: PSYCHIATRY & NEUROLOGY

## 2017-03-20 PROCEDURE — 99233 SBSQ HOSP IP/OBS HIGH 50: CPT | Performed by: PSYCHIATRY & NEUROLOGY

## 2017-03-20 PROCEDURE — 25010000002 LORAZEPAM PER 2 MG: Performed by: PSYCHIATRY & NEUROLOGY

## 2017-03-20 RX ORDER — LORAZEPAM 2 MG/ML
1 INJECTION INTRAMUSCULAR ONCE
Status: DISCONTINUED | OUTPATIENT
Start: 2017-03-20 | End: 2017-03-20

## 2017-03-20 RX ORDER — LORAZEPAM 2 MG/ML
2 INJECTION INTRAMUSCULAR ONCE
Status: DISCONTINUED | OUTPATIENT
Start: 2017-03-20 | End: 2017-03-20

## 2017-03-20 RX ORDER — LORAZEPAM 2 MG/ML
2 INJECTION INTRAMUSCULAR ONCE AS NEEDED
Status: COMPLETED | OUTPATIENT
Start: 2017-03-20 | End: 2017-03-20

## 2017-03-20 RX ADMIN — ASPIRIN 81 MG: 81 TABLET ORAL at 08:39

## 2017-03-20 RX ADMIN — LORAZEPAM 2 MG: 2 INJECTION INTRAMUSCULAR; INTRAVENOUS at 08:39

## 2017-03-20 RX ADMIN — METOPROLOL TARTRATE 25 MG: 25 TABLET, FILM COATED ORAL at 08:39

## 2017-03-20 RX ADMIN — PANTOPRAZOLE SODIUM 40 MG: 40 TABLET, DELAYED RELEASE ORAL at 05:46

## 2017-03-20 RX ADMIN — OSELTAMIVIR PHOSPHATE 75 MG: 75 CAPSULE ORAL at 08:39

## 2017-03-20 RX ADMIN — ACETAMINOPHEN 650 MG: 325 TABLET ORAL at 05:46

## 2017-03-20 NOTE — PAYOR COMM NOTE
"Dc home 3-20-17  [id number]    Ana Small (51 y.o. Female)     Date of Birth Social Security Number Address Home Phone MRN    1965  [address] [facility] [address] [phone] [id number]    [place] Marital Status          Church        Admission Date Admission Type Admitting Provider Attending Provider Department, Room/Bed    3/18/17 Emergency Ila Garza MD  [facility] 4B, 442/1    Discharge Date Discharge Disposition Discharge Destination        3/20/2017 Home or Self Care             Attending Provider: (none)    Allergies:  Sulfa Antibiotics, Latex    Isolation:  None   Infection:  None   Code Status:  FULL    Ht:  63\" (160 cm)   Wt:  250 lb 12.8 oz (114 kg)    Admission Cmt:  None   Principal Problem:  None                Active Insurance as of 3/18/2017     Primary Coverage     Payor Plan Insurance Group Employer/Plan Group    [employer] PPO [id number]     Payor Plan Address Payor Plan Phone Number Effective From Effective To    [address] [phone] 1/1/2017     [place] [address]       Subscriber Name Subscriber Birth Date Member ID       ABEL SMALL 1/29/1964 [id number]                 Emergency Contacts      (Rel.) Home Phone Work Phone Mobile Phone    StaciGil (Spouse) [phone] -- --            Discharge Summary     No notes of this type exist for this encounter.        "

## 2017-03-20 NOTE — PAYOR COMM NOTE
"[facility]  JUJU AMAYA RN [id number]  FAX [phone]  Ana Small (51 y.o. Female) [id number]    Date of Birth Social Security Number Address Home Phone MRN    1965  [address]  [facility] [address] [phone] [id number]    [employer] Marital Status          Faith        Admission Date Admission Type Admitting Provider Attending Provider Department, Room/Bed    3/18/17 Emergency Ila Garza MD Ruxer, Jena Thomas, MD [facility] 4B, 442/1    Discharge Date Discharge Disposition Discharge Destination                      Attending Provider: Ila Garza MD     Allergies:  Sulfa Antibiotics, Latex    Isolation:  None   Infection:  None   Code Status:  FULL    Ht:  63\" (160 cm)   Wt:  250 lb 12.8 oz (114 kg)    Admission Cmt:  None   Principal Problem:  None                Active Insurance as of 3/18/2017     Primary Coverage     Payor Plan Insurance Group Employer/Plan Group    [employer] PPO [id number]     Payor Plan Address Payor Plan Phone Number Effective From Effective To    [address] [phone] 1/1/2017     [place] [address]       Subscriber Name Subscriber Birth Date Member ID       ABEL SMALL 1/29/1964 [id number]                 Emergency Contacts      (Rel.) Home Phone Work Phone Mobile Phone    Gil Small (Spouse) [phone] -- --               History & Physical      H&P signed by Alan Gutierrez MD at 3/19/2017  7:11 PM              TIME: 2:03 a.m.     HISTORY OF PRESENT ILLNESS: Ms. Small is a 51-year-old  female who presents to [facility] due to \"I blacked out.\" Ms. Small relates that she walked to her mailbox this morning and upon returning to the house fell to the ground unconscious for \"a few seconds.\" She then aroused herself, walked back to the house and then had a similar episode 2 hours later. After another period of time, her  " insisted that she come to the hospital for evaluation. Ms. Small relates that she was recently diagnosed with influenza. She was prescribed Tamiflu; however, she has not been using the medication as prescribed. In addition to syncope, she also relates having a poor appetite over the past few days. This evening she had an episode of what she describes as significant chest palpitations.  In the emergency department, she had an EKG consistent with SVT. She relates recurring difficulties with shortness of breath over the past few days. After admission, she also related experiencing intense abdominal pain; however, the abdominal pain lasted only for a few seconds, and she is not able to elaborate further. She relates having nausea, but no vomiting. In addition to the above, she also relates a severe headache. Apparently she has a history of migraine headache. Of note, she also has a history of seizure disorder; however, she has not been treated for such. It is my understanding that many of her problems were diagnosed in [place], and she and her family have moved around quite a bit over the past few yeasr and she has not had any sort of regular medical followup. Presently, she is resting comfortably, but relates ongoing headache.    REVIEW OF SYSTEMS: Otherwise unremarkable from a cardiovascular, pulmonary, gastrointestinal, genitourinary, neurologic, psychiatric, metabolic, and constitutional standpoint except as noted. She relates recent fatigue and weakness. She has had no fevers, chills or sweats. She has had a poor appetite. Her weight is stable. She has had no chest pain, but relates chest palpitations earlier this evening. She has had shortness of breath and lower extremity edema. She has had no orthopnea or cough. She has had wheezing. She has had no hemoptysis. She relates a brief episode of severe abdominal pain, which is now resolved completely. She has had nausea, but no vomiting. She has had no diarrhea, but  has difficulties with chronic constipation. She has had no dysphagia or odynophagia. She has had no hematemesis, hematochezia or melena. She has had no flank pain, pelvic pain, hematuria or dysuria. Note, the patient relates a recent history of dysuria which she attributes to a urinary tract infection. She has had no skin rashes, arthralgias, myalgias or swollen joints. She has had a severe ongoing and generalized headache that has been present since earlier yesterday. She has had no confusion or memory deficits. She relates at least 2 episodes of syncope. She has had no recent changes in her vision or hearing. She does relate that her hearing seems worse since being diagnosed with influenza. She has had no acute motor or sensory deficits. She has had no tremors or gait deficits. She does relate experiencing some dizziness. As noted, she had a fall yesterday morning.      PAST MEDICAL HISTORY:  1.  Hypertension.   2.  TIA.   3.  Seizure disorder (not treated).   4.  Large hepatic hemangioma.   5.  Obesity.   6.  Migraine headache.   7.  Gastroesophageal reflux disease.   8.  Hiatal hernia.   9.  Eosinophilic esophagitis.   10. Chronic constipation.   11. Medical noncompliance.     PAST SURGICAL HISTORY:  1.  Status post  on 2 occasions.   2.  Status post pilonidal cyst resection.   3.  Status post liver resection due to large hemangioma.   4.  Status post parathyroidectomy.   5.  Status post EGD.   6.  Status post esophageal stricture.     The patient specifically denies any history of psychiatric difficulties such as anxiety, depression, ADHD, bipolar, schizophrenia or substance abuse.     ALLERGIES:   1.  SULFA.  2.  LATEX.      HOME MEDICATIONS:  1.  Norco 7.5 p.o. q.4 h. p.r.n. for pain.  2.  Tamiflu 75 mg p.o. b.i.d.     SOCIAL HISTORY: Significant for being a resident of [place], [place]. She is . She has a son and 2 daughters reported to be in good health. She is not employed. She has a  high school education. She attended vocational school to study . She smokes 3-4 cigarettes per day. She drinks beer on occasions, but not regularly. She denies any history of illicit drug use. She is Nondenominational. She has no recent history of travel outside of this region.     Her   will serve as a surrogate for healthcare matters should such become necessary.     CODE STATUS: She is a FULL CODE.     FAMILY HISTORY: Significant for having a brother and sister reported to be in good health, although her brother has a history of heart disease of uncertain type. Her mother  at 52 years of age due to liver cancer. Her father is living and has a history of COPD.     PHYSICAL EXAMINATION:    VITAL SIGNS: Temperature is 97.8, pulse is 88, respirations are 18 and unlabored, blood pressure is 133/97, O2 saturation is 97% breathing ambient air. Weight is 252 pounds.     GENERAL: This is a 51-year-old  female appearing her documented age. She appears to be resting comfortably in bed. She has a towel over her eyes. She relates ongoing headache. She is interactive and cooperative. She proves to be a fairly good historian, although elements of her history are fragmented. No family members are present.     HEAD AND NECK: Essentially unremarkable except as noted. I see no signs of acute trauma. Nose and throat are essentially unremarkable. There is no discharge from the nostrils. Mucous membranes are moist. Neck appears supple. She has no cervical or clavicular adenopathy. She has no definite carotid bruits. There are no masses of the head or neck. Neck veins do not appear pathologically distended.     CARDIAC: Reveals S1 and S2 with a regular rhythm. She has no definite murmurs, rubs or gallops.     LUNGS: Reveals bilateral breath sounds that are clear to auscultation throughout. She has no rales, wheezes or rhonchi.     ABDOMEN: Reveals bowel sounds to be present. Her abdomen is nontender,  nondistended, soft, and obese.     EXTREMITIES: No significant lower extremity edema, erythema or calf tenderness.     NEUROLOGIC: Reveals the patient to be awake and alert. Cranial nerves II-XII appear grossly intact. She exhibits no definite focal, motor or sensory deficits. She seems able to move all extremities without difficulty and at will. She is able to reposition herself in bed without assistance. Her gait was not tested.     PSYCHIATRIC: Reveals her mood to be stable. Affect is flat. Thought processes are organized, in that she is able to answer questions appropriately and provide a history, although the history is somewhat fragmented. Speech seems fluent. There is no flight of ideas. There are no obvious short-term or long-term memory deficits.     DIAGNOSTIC DATA:   1.  A complete metabolic panel demonstrates a sodium of 136, potassium 4.3, chloride 102, CO2 of 19, BUN 19, creatinine 1.04, glucose 138, total calcium 10.2.     2.  Liver function testing demonstrates an ALT of 73, AST 48, alkaline phosphatase level of 153.     3.  CBC is unremarkable.     4.  Influenza screen is negative.     5.  Urine drug screen is negative.     6.  ProBNP is 69.1.     7.  TSH is 2.98.     8.  D-dimer is 1.11.     9.  Initial troponin level is 0.11 with a followup troponin level of 0.158.     10. Chest x-ray demonstrates no acute abnormalities.     11. CTA of the chest demonstrates no evidence of embolic disease. There is some suspicion of an adenoma associated with the right adrenal gland.     12. EKG demonstrates SVT at the rate of 197 beats per minute.     13. Followup EKG demonstrates a sinus rhythm of 91 beats per minute.     13. A 3rd EKG demonstrates sinus rhythm of 84 beats per minute.     IMPRESSION:  1.  Syncope.   2.  SVT.   3.  Seizure disorder.   4.  Migraine headache.   5.  Elevated liver function tests of uncertain etiology.   6.  Hypertension.     PLAN:   1.  At this time, Ms. Small will be admitted to  [facility] for further evaluation and treatment. Her admitting diagnoses are as noted. Her condition at this time is judged to be stable. She will be placed on telemetry.     2.  I have asked the nursing staff to obtain vital signs per protocol. She will be confined to bedrest with bathroom privileges with assistance. Her allergies to sulfa and latex are noted. I have asked the nursing staff to monitor input and output. Daily weights will obtained. Neurologic checks to be obtained every 4 hours. She will be maintained on a regular diet. IV fluids will consist of normal saline at 50 mL/h. Oxygen will be used as needed to maintain her O2 saturation greater than 92%. She is a FULL CODE. Fall precautions are to be instituted. Likewise, seizure precautions are to be instituted.     3.  I will consult the neurology service.     4.  I will obtain followup laboratory studies in the morning.     5.  I will make an effort to obtain her medical records from [place]. Note, the patient is able to recall the name of her physician in [place]. She relates that she was hospitalized at [facility] in [place], [place].     ADMITTING MEDICATIONS:   1.  Lovenox 40 mg subcutaneous daily.   2.  Metoprolol 12.5 mg p.o. b.i.d.   3.  Tamiflu 75 mg p.o. b.i.d.   4.  Protonix 40 mg p.o. daily.   5.  Tylenol 650 mg p.o. q.6 h. p.r.n. for fever and/or discomfort.   6.  Norco 7.5 mg 1 p.o. q.4 h. p.r.n. for pain.   7.  DuoNeb 1 unit q.4 h. p.r.n. for shortness of breath and/or wheezing.   8.  Zofran 4 mg IV q.6 h. p.r.n. for nausea and vomiting.     I will continue to follow Ms. Small closely through the night pending return of the hospitalist team in the morning. The nursing staff may call should they have any questions or concerns. Please refer to the medical record for additional information, orders and/or comments.       cc:       CARMEN Bonner/[id number]  D:  03/19/2017 03:20:12(Eastern Time)  T:   2017 07:53:08(Eastern Time)  Voice ID:  [id number]/Document ID:  [id number]       Electronically signed by Alan Gutierrez MD at 3/19/2017  7:11 PM           Emergency Department Notes      Jamel Carrion Jr., MD at 3/18/2017  6:33 PM          Subjective   Patient is a 51 y.o. female presenting with palpitations.   History provided by:  Patient  Palpitations   Palpitations quality:  Fast  Onset quality:  Sudden  Duration:  1 hour  Timing:  Constant  Progression:  Unchanged  Chronicity:  New  Context: not anxiety, not appetite suppressants, not blood loss, not bronchodilators, not caffeine, not dehydration, not exercise, not hyperventilation, not illicit drugs, not nicotine and not stimulant use    Relieved by:  Nothing  Worsened by:  Nothing  Ineffective treatments:  None tried  Associated symptoms: chest pressure and shortness of breath    Risk factors: no diabetes mellitus, no heart disease, no hx of atrial fibrillation, no hx of DVT, no hx of PE, no hx of thyroid disease, no hypercoagulable state, no hyperthyroidism, no OTC sinus medications and no stress        Review of Systems   Constitutional: Negative.    HENT: Negative.    Respiratory: Positive for shortness of breath.    Cardiovascular: Positive for palpitations.   Gastrointestinal: Negative.    Genitourinary: Negative.    Musculoskeletal: Negative.    Skin: Negative.    Neurological: Negative.    Hematological: Negative.    Psychiatric/Behavioral: Negative.  other systems reviewed and are negative.      Past Medical History   Diagnosis Date   • Disease of thyroid gland    • Migraines    • Seizures    • Stroke    • TIA (transient ischemic attack)        Allergies   Allergen Reactions   • Sulfa Antibiotics Anaphylaxis   • Latex        Past Surgical History   Procedure Laterality Date   • Liver resection     •  section     • Thyroid surgery     • Parathyroid gland surgery     • Hemangioma excision       from liver.        History reviewed. No  pertinent family history.    Social History     Social History   • Marital status:      Spouse name: N/A   • Number of children: N/A   • Years of education: N/A     Social History Main Topics   • Smoking status: Current Every Day Smoker     Packs/day: 0.50   • Smokeless tobacco: None   • Alcohol use No   • Drug use: No   • Sexual activity: Defer     Other Topics Concern   • None     Social History Narrative       Prior to Admission medications    Medication Sig Start Date End Date Taking? Authorizing Provider   HYDROcodone-acetaminophen (NORCO) 7.5-325 MG per tablet Take 1 tablet by mouth Every 4 (Four) Hours As Needed for moderate pain (4-6). 3/3/17   Leann DERAS MD   oseltamivir (TAMIFLU) 75 MG capsule Take 1 capsule by mouth 2 (Two) Times a Day. 3/3/17   Leann DERAS MD       Medications   sodium chloride 0.9 % flush 10 mL (10 mL Intravenous Given 3/18/17 2141)   HYDROcodone-acetaminophen (NORCO) 7.5-325 MG per tablet 1 tablet (not administered)   oseltamivir (TAMIFLU) capsule 75 mg (not administered)   sodium chloride 0.9 % flush 1-10 mL (not administered)   enoxaparin (LOVENOX) syringe 40 mg (not administered)   acetaminophen (TYLENOL) tablet 650 mg (not administered)   ondansetron (ZOFRAN) injection 4 mg (4 mg Intravenous Given 3/19/17 0219)   ipratropium-albuterol (DUO-NEB) nebulizer solution 3 mL (not administered)   metoprolol tartrate (LOPRESSOR) tablet 12.5 mg (not administered)   pantoprazole (PROTONIX) EC tablet 40 mg (40 mg Oral Given 3/19/17 0634)   sodium chloride 0.9 % infusion (not administered)   aspirin EC tablet 81 mg (not administered)   nitroglycerin (NITROSTAT) SL tablet 0.4 mg (not administered)   adenosine (ADENOCARD) 6 MG/2ML injection  - ADS Override Pull (6 mg  Given 3/18/17 1819)   LORazepam (ATIVAN) injection 1 mg (1 mg Intravenous Given 3/18/17 1839)   adenosine (ADENOCARD) injection 12 mg (12 mg Intravenous Given 3/18/17 1819)   adenosine (ADENOCARD) injection 6 mg (12 mg  Intravenous Given 3/18/17 1821)   acetaminophen (TYLENOL) tablet 1,000 mg (1,000 mg Oral Given 3/18/17 1943)   iopamidol (ISOVUE-370) 76 % injection 100 mL (100 mL Intravenous Given 3/18/17 2017)   metoprolol tartrate (LOPRESSOR) injection 5 mg (5 mg Intravenous Given 3/18/17 2100)   Morphine sulfate (PF) injection 4 mg (4 mg Intravenous Given 3/18/17 2140)   ondansetron (ZOFRAN) injection 4 mg (4 mg Intravenous Given 3/18/17 2140)   enoxaparin (LOVENOX) syringe 110 mg (110 mg Subcutaneous Given 3/18/17 2141)   HYDROmorphone (DILAUDID) injection 1 mg (1 mg Intravenous Given 3/19/17 0120)       Vitals:    03/19/17 0746   BP: 132/85   Pulse: 76   Resp: 18   Temp: 97.2 °F (36.2 °C)   SpO2: 95%         Objective   Physical Exam   Constitutional: She is oriented to person, place, and time. She appears well-nourished.   HENT:   Head: Normocephalic and atraumatic.   Nose: Nose normal.   Eyes: EOM are normal. Pupils are equal, round, and reactive to light.   Neck: Normal range of motion. Neck supple.   Cardiovascular: Regular rhythm.    Pulmonary/Chest: Effort normal and breath sounds normal.   Abdominal: Soft. Bowel sounds are normal.   Musculoskeletal: Normal range of motion.   Neurological: She is alert and oriented to person, place, and time.   Skin: Skin is warm and dry.   Psychiatric: She has a normal mood and affect. Her behavior is normal. note and vitals reviewed.      Procedures        Lab Results (last 24 hours)     Procedure Component Value Units Date/Time    BNP [[id number]]  (Normal) Collected:  03/18/17 1826    Specimen:  Blood Updated:  03/18/17 1944     proBNP 69.1 pg/mL     CBC & Differential [[id number]] Collected:  03/18/17 1826    Specimen:  Blood Updated:  03/18/17 1959    Narrative:       The following orders were created for panel order CBC & Differential.  Procedure                               Abnormality         Status                     ---------                               -----------          ------                     CBC Auto Differential[[id number]]         Abnormal            Final result                 Please view results for these tests on the individual orders.    D-dimer, Quantitative [[id number]]  (Abnormal) Collected:  03/18/17 1826    Specimen:  Blood Updated:  03/18/17 1944     D-Dimer, Quantitative 1.11 (H) mg/L (FEU)     Narrative:       Reference Range is 0-0.50 mg/L FEU. However, results <0.50 mg/L FEU tends to rule out DVT or PE. Results >0.50 mg/L FEU are not useful in predicting absence or presence of DVT or PE.    TSH [[id number]]  (Normal) Collected:  03/18/17 1826    Specimen:  Blood Updated:  03/18/17 2006     TSH 2.980 mIU/mL     Comprehensive Metabolic Panel [[id number]]  (Abnormal) Collected:  03/18/17 1826    Specimen:  Blood Updated:  03/18/17 1944     Glucose 138 (H) mg/dL      BUN 19 mg/dL      Creatinine 1.04 mg/dL      Sodium 136 mmol/L      Potassium 4.3 mmol/L      Chloride 102 mmol/L      CO2 19.0 (L) mmol/L      Calcium 10.2 mg/dL      Total Protein 7.1 g/dL      Albumin 4.00 g/dL      ALT (SGPT) 73 (H) U/L      AST (SGOT) 48 (H) U/L      Alkaline Phosphatase 153 (H) U/L      Total Bilirubin 0.6 mg/dL      eGFR Non African Amer 55 (L) mL/min/1.73      Globulin 3.1 gm/dL      A/G Ratio 1.3 g/dL      BUN/Creatinine Ratio 18.3      Anion Gap 15.0 (H) mmol/L     CBC Auto Differential [[id number]]  (Abnormal) Collected:  03/18/17 1826    Specimen:  Blood Updated:  03/18/17 1959     WBC 10.26 10*3/mm3      RBC 4.88 10*6/mm3      Hemoglobin 14.6 g/dL      Hematocrit 43.7 %      MCV 89.5 fL      MCH 29.9 pg      MCHC 33.4 g/dL      RDW 14.1 %      RDW-SD 46.1 fl      MPV 10.8 fL      Platelets 412 (H) 10*3/mm3      Neutrophil % 57.8 %      Lymphocyte % 30.4 %      Monocyte % 9.2 %      Eosinophil % 1.8 %      Basophil % 0.4 %      Immature Grans % 0.4 %      Neutrophils, Absolute 5.94 10*3/mm3      Lymphocytes, Absolute 3.12 10*3/mm3      Monocytes, Absolute 0.94 10*3/mm3       Eosinophils, Absolute 0.18 10*3/mm3      Basophils, Absolute 0.04 10*3/mm3      Immature Grans, Absolute 0.04 (H) 10*3/mm3     Ethanol [[id number]]  (Normal) Collected:  03/18/17 1826    Specimen:  Blood Updated:  03/18/17 2002     Ethanol % <0.010 %     Narrative:       Not for legal purposes. Chain of Custody not followed.     POC Troponin, Rapid [[id number]]  (Normal) Collected:  03/18/17 1835    Specimen:  Blood Updated:  03/18/17 1850     Troponin I 0.00 ng/mL       Serial Number: [id number]    : [id number]       Urine Drug Screen [[id number]]  (Normal) Collected:  03/18/17 2001    Specimen:  Urine from Urine, Clean Catch Updated:  03/18/17 2106     Amphetamine Screen, Urine Negative      Barbiturates Screen, Urine Negative      Benzodiazepine Screen, Urine Negative      Cocaine Screen, Urine Negative      Methadone Screen, Urine Negative      Opiate Screen Negative      Phencyclidine (PCP), Urine Negative      THC, Screen, Urine Negative     Narrative:       Negative Thresholds For Drugs Screened in Urine:    Amphetamines          500 ng/ml  Barbiturates          200 ng/ml  Benzodiazepines       200 ng/ml  Cocaine               150 ng/ml  Methadone             150 ng/ml  Opiates               300 ng/ml  Phencyclidine         25 ng/ml  THC                      50 ng/ml    The normal value for all drugs tested is negative. This report includes final unconfirmed screening results.  A positive result by this assay can be, at your request, sent to the Reference Lab for confirmation by gas chromatography. Unconfirmed results must not be used for non-medical purposes, such as employment or legal testing. Clinical consideration should be applied to any drug of abuse test result, particularly when unconfirmed results are used.    POC Troponin, Rapid [[id number]]  (Abnormal) Collected:  03/18/17 2111    Specimen:  Blood Updated:  03/18/17 2125     Troponin I 0.11 (H) ng/mL       Serial Number: [id number]    : [id number]        Influenza Antigen [[id number]]  (Normal) Collected:  03/18/17 2225    Specimen:  Swab from Nasopharynx Updated:  03/18/17 2240     Influenza A Ag, EIA Negative      Influenza B Ag, EIA Negative     Narrative:         Recommend confirmation of negative results by viral culture or molecular assay.    CBC Auto Differential [[id number]]  (Normal) Collected:  03/19/17 0012    Specimen:  Blood Updated:  03/19/17 0022     WBC 7.76 10*3/mm3      RBC 4.76 10*6/mm3      Hemoglobin 14.2 g/dL      Hematocrit 42.6 %      MCV 89.5 fL      MCH 29.8 pg      MCHC 33.3 g/dL      RDW 14.2 %      RDW-SD 46.1 fl      MPV 9.9 fL      Platelets 339 10*3/mm3      Neutrophil % 64.5 %      Lymphocyte % 25.4 %      Monocyte % 7.2 %      Eosinophil % 2.3 %      Basophil % 0.3 %      Immature Grans % 0.3 %      Neutrophils, Absolute 5.01 10*3/mm3      Lymphocytes, Absolute 1.97 10*3/mm3      Monocytes, Absolute 0.56 10*3/mm3      Eosinophils, Absolute 0.18 10*3/mm3      Basophils, Absolute 0.02 10*3/mm3      Immature Grans, Absolute 0.02 10*3/mm3     Troponin [[id number]]  (Abnormal) Collected:  03/19/17 0013    Specimen:  Blood Updated:  03/19/17 0045     Troponin I 0.158 (H) ng/mL     Troponin [[id number]]  (Abnormal) Collected:  03/19/17 0402    Specimen:  Blood Updated:  03/19/17 0438     Troponin I 0.130 (H) ng/mL     Basic Metabolic Panel [[id number]]  (Abnormal) Collected:  03/19/17 0402    Specimen:  Blood Updated:  03/19/17 0434     Glucose 116 (H) mg/dL      BUN 14 mg/dL      Creatinine 0.84 mg/dL      Sodium 138 mmol/L      Potassium 4.3 mmol/L      Chloride 107 mmol/L      CO2 24.0 mmol/L      Calcium 9.8 mg/dL      eGFR Non African Amer 71 mL/min/1.73      BUN/Creatinine Ratio 16.7      Anion Gap 7.0 mmol/L     Narrative:       GFR Normal >60  Chronic Kidney Disease <60  Kidney Failure <15    CBC (No Diff) [[id number]]  (Abnormal) Collected:  03/19/17 0402    Specimen:  Blood Updated:  03/19/17 0416     WBC 6.19 10*3/mm3      RBC 4.67  10*6/mm3      Hemoglobin 13.8 g/dL      Hematocrit 42.0 %      MCV 89.9 fL      MCH 29.6 pg      MCHC 32.9 (L) g/dL      RDW 14.4 %      RDW-SD 47.0 fl      MPV 9.8 fL      Platelets 322 10*3/mm3           CT Angiogram Chest With Contrast   Final Result   1. No evidence of embolic disease.   2. Suspicious for adenoma associated with the right adrenal gland.       This report was finalized on 03/18/2017 21:07 by Dr. Faraz Browning MD.      XR Chest 1 View   Final Result   Negative single view chest.   This report was finalized on 03/18/2017 19:38 by Dr. Faraz Browning MD.      US Carotid Bilateral    (Results Pending)       ED Course  ED Course   Comment By Time   Patient was given 6 mg of adenosine which slowed her heart rate did not converted.  We then repeated with 12 mg of adenosine and she converted to a normal sinus rhythm with a rate of around 100 at that time and began feeling much better.  Were now waiting on the workup.  She will be turned over to Dr. Shaffer. Jamel Carrion Jr., MD 03/18 1839   Suspicious for adenoma associated with the right adrenal gland.     Pt was informed  Calos Shaffer MD 03/18 2047   Sinus rhythm with nonspecific ST-T wave changes Calos Shaffer MD 03/18 2203   Patient came in PSVT provided and was seen repeat cardiac markers are elevated she needs observation in  the hospital Calos Shaffer MD 03/18 2204   Dr Gutierrez wanted a flu swab he will follow up on results  Calos Shaffer MD 03/18 2215   Patient is complaining of headache Which is something chronic like her usual migraine headaches Calos Shaffer MD 03/18 2218    The patient's symptoms are now better.  Patient is not having pain.  No chest pain, difficulty breathing, nausea, vomiting or palpitations.  No anxiety or dizziness.  Vital signs have been reviewed and appear to be correct.  Physical exam findings are improved.  Alert.  Oriented X3 .  No acute distress.  Breath sounds normal.  No respiratory distress, decreased  breath sounds or wheezes.  Normal heart rate and rhythm.  Heart sounds normal.  .  Abdomen soft and nontender.  No abdominal tenderness or guarding or rebound tenderness.  Skin warm and dry.  No cyanosis or diaphoresis.  Oriented X 3.  Not anxious.  No alteration in mental status or weakness.         Calos Shaffer MD  2218          [facility]  Number of Diagnoses or Management Options  Adrenal adenoma, right:   Chronic migraine without aura without status migrainosus, not intractable:   Elevated troponin:   PSVT (paroxysmal supraventricular tachycardia):       Final diagnoses:   PSVT (paroxysmal supraventricular tachycardia)   Elevated troponin   Chronic migraine without aura without status migrainosus, not intractable   Adrenal adenoma, right       Jamel Carrion Jr., MD  17 0750       Electronically signed by Jamel Carrion Jr., MD at 3/19/2017  7:50 AM      Calos Shaffer MD at 3/18/2017 10:18 PM          Subjective   History of Present Illness    Review of Systems    Past Medical History   Diagnosis Date   • Disease of thyroid gland    • Migraines        Allergies   Allergen Reactions   • Sulfa Antibiotics Anaphylaxis       Past Surgical History   Procedure Laterality Date   • Liver resection     •  section     • Thyroid surgery         History reviewed. No pertinent family history.    Social History     Social History   • Marital status:      Spouse name: N/A   • Number of children: N/A   • Years of education: N/A     Social History Main Topics   • Smoking status: Current Every Day Smoker     Packs/day: 0.50   • Smokeless tobacco: None   • Alcohol use No   • Drug use: No   • Sexual activity: Defer     Other Topics Concern   • None     Social History Narrative   • None           Objective   Physical Exam    Procedures        ED Course  ED Course   Comment By Time   Patient was given 6 mg of adenosine which slowed her heart rate did not converted.  We then repeated with 12 mg of adenosine  and she converted to a normal sinus rhythm with a rate of around 100 at that time and began feeling much better.  Were now waiting on the workup.  She will be turned over to Dr. Shaffer. Jamel Carrion Jr., MD 03/18 1839   Suspicious for adenoma associated with the right adrenal gland.     Pt was informed  Calos Shaffer MD 03/18 2047   Sinus rhythm with nonspecific ST-T wave changes Calos Shaffer MD 03/18 2203   Patient came in PSVT provided and was seen repeat cardiac markers are elevated she needs observation in  the hospital Calos Shaffer MD 03/18 2204   Dr Gutierrez wanted a flu swab he will follow up on results  Calos Shaffer MD 03/18 2215   Patient is complaining of headache Which is something chronic like her usual migraine headaches Calos Shaffer MD 03/18 2218    The patient's symptoms are now better.  Patient is not having pain.  No chest pain, difficulty breathing, nausea, vomiting or palpitations.  No anxiety or dizziness.  Vital signs have been reviewed and appear to be correct.  Physical exam findings are improved.  Alert.  Oriented X3 .  No acute distress.  Breath sounds normal.  No respiratory distress, decreased breath sounds or wheezes.  Normal heart rate and rhythm.  Heart sounds normal.  .  Abdomen soft and nontender.  No abdominal tenderness or guarding or rebound tenderness.  Skin warm and dry.  No cyanosis or diaphoresis.  Oriented X 3.  Not anxious.  No alteration in mental status or weakness.         Calos Shaffer MD 03/18 2218                  [facility]    Final diagnoses:   PSVT (paroxysmal supraventricular tachycardia)   Elevated troponin   Chronic migraine without aura without status migrainosus, not intractable   Adrenal adenoma, right           Calos Shaffer MD  03/18/17 2219      Calos Shaffer MD  03/18/17 2223       Electronically signed by Calos Shaffer MD at 3/18/2017 10:23 PM        Vital Signs (last 72 hrs)       03/17 0700  -  03/18 0659 03/18 0700  -  03/19 0659 03/19 0700  -   03/20 0659 03/20 0700  -  03/20 0918   Most Recent    Temp (°F)   97.8 -  98.5    97.2 -  97.7       97.7 (36.5)    Heart Rate   78 -  (!)192    72 -  76       72    Resp   16 -  18    16 -  18       16    BP   91/52 -  142/88    115/69 -  132/85       115/69    SpO2 (%)   92 -  100    95 -  96       96          Intake & Output (last 3 days)       03/17 0701 - 03/18 0700 03/18 0701 - 03/19 0700 03/19 0701 - 03/20 0700 03/20 0701 - 03/21 0700    P.O.   1200     Total Intake(mL/kg)   1200 (10.5)     Urine (mL/kg/hr)  750 600 (0.2)     Total Output   750 600      Net   -750 +600                  Lab Results (last 72 hours)     Procedure Component Value Units Date/Time    POC Troponin, Rapid [[id number]]  (Normal) Collected:  03/18/17 1835    Specimen:  Blood Updated:  03/18/17 1850     Troponin I 0.00 ng/mL       Serial Number: [id number]    : [id number]       D-dimer, Quantitative [[id number]]  (Abnormal) Collected:  03/18/17 1826    Specimen:  Blood Updated:  03/18/17 1944     D-Dimer, Quantitative 1.11 (H) mg/L (FEU)     Narrative:       Reference Range is 0-0.50 mg/L FEU. However, results <0.50 mg/L FEU tends to rule out DVT or PE. Results >0.50 mg/L FEU are not useful in predicting absence or presence of DVT or PE.    Comprehensive Metabolic Panel [[id number]]  (Abnormal) Collected:  03/18/17 1826    Specimen:  Blood Updated:  03/18/17 1944     Glucose 138 (H) mg/dL      BUN 19 mg/dL      Creatinine 1.04 mg/dL      Sodium 136 mmol/L      Potassium 4.3 mmol/L      Chloride 102 mmol/L      CO2 19.0 (L) mmol/L      Calcium 10.2 mg/dL      Total Protein 7.1 g/dL      Albumin 4.00 g/dL      ALT (SGPT) 73 (H) U/L      AST (SGOT) 48 (H) U/L      Alkaline Phosphatase 153 (H) U/L      Total Bilirubin 0.6 mg/dL      eGFR Non African Amer 55 (L) mL/min/1.73      Globulin 3.1 gm/dL      A/G Ratio 1.3 g/dL      BUN/Creatinine Ratio 18.3      Anion Gap 15.0 (H) mmol/L     BNP [[id number]]  (Normal) Collected:  03/18/17 3084     Specimen:  Blood Updated:  03/18/17 1944     proBNP 69.1 pg/mL     CBC & Differential [[id number]] Collected:  03/18/17 1826    Specimen:  Blood Updated:  03/18/17 1959    Narrative:       The following orders were created for panel order CBC & Differential.  Procedure                               Abnormality         Status                     ---------                               -----------         ------                     CBC Auto Differential[[id number]]         Abnormal            Final result                 Please view results for these tests on the individual orders.    CBC Auto Differential [[id number]]  (Abnormal) Collected:  03/18/17 1826    Specimen:  Blood Updated:  03/18/17 1959     WBC 10.26 10*3/mm3      RBC 4.88 10*6/mm3      Hemoglobin 14.6 g/dL      Hematocrit 43.7 %      MCV 89.5 fL      MCH 29.9 pg      MCHC 33.4 g/dL      RDW 14.1 %      RDW-SD 46.1 fl      MPV 10.8 fL      Platelets 412 (H) 10*3/mm3      Neutrophil % 57.8 %      Lymphocyte % 30.4 %      Monocyte % 9.2 %      Eosinophil % 1.8 %      Basophil % 0.4 %      Immature Grans % 0.4 %      Neutrophils, Absolute 5.94 10*3/mm3      Lymphocytes, Absolute 3.12 10*3/mm3      Monocytes, Absolute 0.94 10*3/mm3      Eosinophils, Absolute 0.18 10*3/mm3      Basophils, Absolute 0.04 10*3/mm3      Immature Grans, Absolute 0.04 (H) 10*3/mm3     Ethanol [[id number]]  (Normal) Collected:  03/18/17 1826    Specimen:  Blood Updated:  03/18/17 2002     Ethanol % <0.010 %     Narrative:       Not for legal purposes. Chain of Custody not followed.     TSH [[id number]]  (Normal) Collected:  03/18/17 1826    Specimen:  Blood Updated:  03/18/17 2006     TSH 2.980 mIU/mL     Urine Drug Screen [[id number]]  (Normal) Collected:  03/18/17 2001    Specimen:  Urine from Urine, Clean Catch Updated:  03/18/17 2106     Amphetamine Screen, Urine Negative      Barbiturates Screen, Urine Negative      Benzodiazepine Screen, Urine Negative      Cocaine Screen, Urine Negative       Methadone Screen, Urine Negative      Opiate Screen Negative      Phencyclidine (PCP), Urine Negative      THC, Screen, Urine Negative     Narrative:       Negative Thresholds For Drugs Screened in Urine:    Amphetamines          500 ng/ml  Barbiturates          200 ng/ml  Benzodiazepines       200 ng/ml  Cocaine               150 ng/ml  Methadone             150 ng/ml  Opiates               300 ng/ml  Phencyclidine         25 ng/ml  THC                      50 ng/ml    The normal value for all drugs tested is negative. This report includes final unconfirmed screening results.  A positive result by this assay can be, at your request, sent to the Reference Lab for confirmation by gas chromatography. Unconfirmed results must not be used for non-medical purposes, such as employment or legal testing. Clinical consideration should be applied to any drug of abuse test result, particularly when unconfirmed results are used.    POC Troponin, Rapid [[id number]]  (Abnormal) Collected:  03/18/17 2111    Specimen:  Blood Updated:  03/18/17 2125     Troponin I 0.11 (H) ng/mL       Serial Number: [id number]    : [id number]       Influenza Antigen [[id number]]  (Normal) Collected:  03/18/17 2225    Specimen:  Swab from Nasopharynx Updated:  03/18/17 2240     Influenza A Ag, EIA Negative      Influenza B Ag, EIA Negative     Narrative:         Recommend confirmation of negative results by viral culture or molecular assay.    [place] Draw [[id number]] Collected:  03/18/17 1826    Specimen:  Blood Updated:  03/18/17 2302    Narrative:       The following orders were created for panel order [place] Draw.  Procedure                               Abnormality         Status                     ---------                               -----------         ------                     Light Blue Top[[id number]]                                    Final result               Green Top (Gel)[[id number]]                                   Final result                Lavender Top[[id number]]                                      Final result               Red Top[[id number]]                                           Final result               Green Top (No Gel)[[id number]]                                                             Please view results for these tests on the individual orders.    Light Blue Top [[id number]] Collected:  03/18/17 1826    Specimen:  Blood Updated:  03/18/17 2302     Extra Tube hold for add-on       Auto resulted       Green Top (Gel) [[id number]] Collected:  03/18/17 1826    Specimen:  Blood Updated:  03/18/17 2302     Extra Tube Hold for add-ons.       Auto resulted.       Lavender Top [[id number]] Collected:  03/18/17 1826    Specimen:  Blood Updated:  03/18/17 2302     Extra Tube hold for add-on       Auto resulted       Red Top [[id number]] Collected:  03/18/17 1826    Specimen:  Blood Updated:  03/18/17 2302     Extra Tube Hold for add-ons.       Auto resulted.       CBC Auto Differential [[id number]]  (Normal) Collected:  03/19/17 0012    Specimen:  Blood Updated:  03/19/17 0022     WBC 7.76 10*3/mm3      RBC 4.76 10*6/mm3      Hemoglobin 14.2 g/dL      Hematocrit 42.6 %      MCV 89.5 fL      MCH 29.8 pg      MCHC 33.3 g/dL      RDW 14.2 %      RDW-SD 46.1 fl      MPV 9.9 fL      Platelets 339 10*3/mm3      Neutrophil % 64.5 %      Lymphocyte % 25.4 %      Monocyte % 7.2 %      Eosinophil % 2.3 %      Basophil % 0.3 %      Immature Grans % 0.3 %      Neutrophils, Absolute 5.01 10*3/mm3      Lymphocytes, Absolute 1.97 10*3/mm3      Monocytes, Absolute 0.56 10*3/mm3      Eosinophils, Absolute 0.18 10*3/mm3      Basophils, Absolute 0.02 10*3/mm3      Immature Grans, Absolute 0.02 10*3/mm3     Troponin [[id number]]  (Abnormal) Collected:  03/19/17 0013    Specimen:  Blood Updated:  03/19/17 0045     Troponin I 0.158 (H) ng/mL     CBC (No Diff) [[id number]]  (Abnormal) Collected:  03/19/17 0402    Specimen:  Blood Updated:  03/19/17 0416     WBC 6.19  10*3/mm3      RBC 4.67 10*6/mm3      Hemoglobin 13.8 g/dL      Hematocrit 42.0 %      MCV 89.9 fL      MCH 29.6 pg      MCHC 32.9 (L) g/dL      RDW 14.4 %      RDW-SD 47.0 fl      MPV 9.8 fL      Platelets 322 10*3/mm3     Basic Metabolic Panel [[id number]]  (Abnormal) Collected:  03/19/17 0402    Specimen:  Blood Updated:  03/19/17 0434     Glucose 116 (H) mg/dL      BUN 14 mg/dL      Creatinine 0.84 mg/dL      Sodium 138 mmol/L      Potassium 4.3 mmol/L      Chloride 107 mmol/L      CO2 24.0 mmol/L      Calcium 9.8 mg/dL      eGFR Non African Amer 71 mL/min/1.73      BUN/Creatinine Ratio 16.7      Anion Gap 7.0 mmol/L     Narrative:       GFR Normal >60  Chronic Kidney Disease <60  Kidney Failure <15    Troponin [[id number]]  (Abnormal) Collected:  03/19/17 0402    Specimen:  Blood Updated:  03/19/17 0438     Troponin I 0.130 (H) ng/mL     Troponin [[id number]]  (Abnormal) Collected:  03/19/17 1041    Specimen:  Blood Updated:  03/19/17 1126     Troponin I 0.070 (H) ng/mL           Imaging Results (last 72 hours)     Procedure Component Value Units Date/Time    XR Chest 1 View [[id number]] Collected:  03/18/17 1843     Updated:  03/18/17 1941    Narrative:       EXAMINATION:   XR CHEST 1 VW-  3/18/2017 6:42 PM CDT     HISTORY: Cough, tachycardia     Single view of the chest is obtained. The lungs are clear. Cardiac  silhouette is normal. Cardiac monitoring leads are present.       Impression:       Negative single view chest.  This report was finalized on 03/18/2017 19:38 by Dr. Faraz Browning MD.    CT Angiogram Chest With Contrast [[id number]] Collected:  03/18/17 2024     Updated:  03/18/17 2111    Narrative:       EXAMINATION:   CT ANGIOGRAM CHEST W CONTRAST-  3/18/2017 8:19 PM CDT CT  chest angiogram dated 3/18/2017 7:58 PM CDT      HISTORY: SVT     COMPARISON: None.      DLP: 803 mGy cm. Automated exposure control was also utilized to  decrease patient radiation dose.     TECHNIQUE: Helical tomographic images of the  chest were obtained after  the administration of intravenous contrast following angiogram protocol.  Additionally, 3D MIP reconstructions in the coronal and sagittal planes  were provided.        FINDINGS:    The imaged portion of the base of the neck appears unremarkable.      There is adequate enhancement of the pulmonary arteries to evaluate for  central and segmental pulmonary emboli. There are no filling defects  within the main, lobar, segmental or visualized subsegmental pulmonary  arteries. The pulmonary vessels are within normal limits.      The lungs are clear without pleural effusion, consolidation, nodule, or  mass lesion. The trachea and bronchial tree are patent.      The heart and great vessels appear unremarkable. There is no pericardial  effusion.      No enlarged axillary or mediastinal lymph nodes are present.      The osseous structures of the thorax and surrounding soft tissues  demonstrate no acute process.      Surgical clips are present and associated with the liver. There may be  an adenoma associated with the right adrenal gland.        Impression:       1. No evidence of embolic disease.  2. Suspicious for adenoma associated with the right adrenal gland.     This report was finalized on 03/18/2017 21:07 by Dr. Faraz Browning MD.    US Carotid Bilateral [[id number]]      Updated:  03/19/17 1518          Orders (last 24 hrs)     Start     Ordered    03/20/17 0815  LORazepam (ATIVAN) injection 2 mg  Once,   Status:  Discontinued      03/20/17 0735    03/20/17 0814  LORazepam (ATIVAN) injection 2 mg  Once As Needed      03/20/17 0815    03/20/17 0730  LORazepam (ATIVAN) injection 1 mg  Once,   Status:  Discontinued      03/20/17 0653    03/20/17 0700  MRI Brain With & Without Contrast  1 Time Imaging      03/19/17 1417    03/20/17 0700  EEG  Once      03/19/17 1417    03/19/17 2100  enoxaparin (LOVENOX) syringe 40 mg  Nightly      03/18/17 2350    03/19/17 2100  metoprolol tartrate (LOPRESSOR)  tablet 25 mg  Every 12 Hours Scheduled      03/19/17 1245    03/19/17 1410  EEG  Once,   Status:  Canceled      03/19/17 1410    03/19/17 1410  MRI Brain With & Without Contrast  1 Time Imaging,   Status:  Canceled      03/19/17 1410    03/19/17 0947  metoprolol tartrate (LOPRESSOR) injection 5 mg  Every 4 Hours PRN      03/19/17 0947    03/19/17 0900  oseltamivir (TAMIFLU) capsule 75 mg  Every 12 Hours Scheduled      03/18/17 2350    03/19/17 0900  metoprolol tartrate (LOPRESSOR) tablet 12.5 mg  Every 12 Hours Scheduled,   Status:  Discontinued      03/19/17 0201    03/19/17 0900  aspirin EC tablet 81 mg  Daily      03/19/17 0222    03/19/17 0600  pantoprazole (PROTONIX) EC tablet 40 mg  Every Early Morning      03/19/17 0201    03/19/17 0300  sodium chloride 0.9 % infusion  Continuous      03/19/17 0221    03/19/17 0222  nitroglycerin (NITROSTAT) SL tablet 0.4 mg  Every 5 Minutes PRN      03/19/17 0222    03/18/17 2350  HYDROcodone-acetaminophen (NORCO) 7.5-325 MG per tablet 1 tablet  Every 4 Hours PRN,   Status:  Discontinued      03/18/17 2350    03/18/17 2350  sodium chloride 0.9 % flush 1-10 mL  As Needed      03/18/17 2350    03/18/17 2218  Troponin  Now Then Every 6 Hours      03/18/17 2217 03/18/17 2217  ipratropium-albuterol (DUO-NEB) nebulizer solution 3 mL  Every 4 Hours PRN      03/18/17 2217    03/18/17 2217  acetaminophen (TYLENOL) tablet 650 mg  Every 6 Hours PRN      03/18/17 2217    03/18/17 2217  ondansetron (ZOFRAN) injection 4 mg  Every 6 Hours PRN      03/18/17 2217    03/18/17 1826  sodium chloride 0.9 % flush 10 mL  As Needed      03/18/17 1826    --  SCANNED - TELEMETRY        03/18/17 0000             Physician Progress Notes (last 72 hours) (Notes from 3/17/2017  9:18 AM through 3/20/2017  9:18 AM)      Ila Garza MD at 3/19/2017  8:31 AM  Version 1 of 1             [facility] Medicine Services  INPATIENT PROGRESS NOTE    Length of Stay: 1  Date  of Admission: 3/18/2017  Primary Care Physician: No Known Provider    Subjective   Chief Complaint: HA  HPI   She admitted after having syncopal episode ×2.  She states that she has had no other similar symptoms however currently just suffering from a headache.  She states she does get migraines does not take any current medications for this.  She tells me she has tried many different medications including seizure medicines, triptan's, narcotics and has had no relief, except with one seizure medication that she could not tolerate as it caused her to be too fatigued.  She is not followed by neurologist.  She also does not follow neurologist for her seizures which she tells me she has had a handful of times.  She currently is not having any chest pain or palpitations in her heart rate is improved after being started on a low-dose beta blocker by cardiology.        Review of Systems     All pertinent negatives and positives are as above. All other systems have been reviewed and are negative unless otherwise stated.     Objective    Temp:  [97.2 °F (36.2 °C)-98.5 °F (36.9 °C)] 97.2 °F (36.2 °C)  Heart Rate:  [] 76  Resp:  [16-18] 18  BP: ()/(52-98) 132/85  Physical Exam   Constitutional: She is oriented to person, place, and time. She appears well-developed and well-nourished.   HENT:   Head: Normocephalic and atraumatic.   Eyes: Conjunctivae and EOM are normal. Pupils are equal, round, and reactive to light.   Neck: Neck supple. No JVD present. No thyromegaly present.   Cardiovascular: Normal rate, regular rhythm, normal heart sounds and intact distal pulses.  Exam reveals no gallop and no friction rub.    No murmur heard.  Pulmonary/Chest: Effort normal and breath sounds normal. No respiratory distress. She has no wheezes. She has no rales. She exhibits no tenderness.   Abdominal: Soft. Bowel sounds are normal. She exhibits no distension. There is no tenderness. There is no rebound and no guarding.    Musculoskeletal: Normal range of motion. She exhibits no edema, tenderness or deformity.   Lymphadenopathy:     She has no cervical adenopathy.   Neurological: She is alert and oriented to person, place, and time. She displays normal reflexes. No cranial nerve deficit. She exhibits normal muscle tone.   Skin: Skin is warm and dry. No rash noted.   Psychiatric: She has a normal mood and affect. Her behavior is normal. Judgment and thought content normal.           Results Review:  I have reviewed the labs, radiology results, and diagnostic studies.    Laboratory Data:     Results from last 7 days  Lab Units 03/19/17  0402 03/19/17  0012 03/18/17  1826   WBC 10*3/mm3 6.19 7.76 10.26   HEMOGLOBIN g/dL 13.8 14.2 14.6   HEMATOCRIT % 42.0 42.6 43.7   PLATELETS 10*3/mm3 322 339 412*          Results from last 7 days  Lab Units 03/19/17  0402 03/18/17  1826   SODIUM mmol/L 138 136   POTASSIUM mmol/L 4.3 4.3   CHLORIDE mmol/L 107 102   TOTAL CO2 mmol/L 24.0 19.0*   BUN mg/dL 14 19   CREATININE mg/dL 0.84 1.04   CALCIUM mg/dL 9.8 10.2   BILIRUBIN mg/dL  --  0.6   ALK PHOS U/L  --  153*   ALT (SGPT) U/L  --  73*   AST (SGOT) U/L  --  48*   GLUCOSE mg/dL 116* 138*       Culture Data:        Radiology Data:   Imaging Results (last 24 hours)     Procedure Component Value Units Date/Time    XR Chest 1 View [[id number]] Collected:  03/18/17 1843     Updated:  03/18/17 1941    Narrative:       EXAMINATION:   XR CHEST 1 VW-  3/18/2017 6:42 PM CDT     HISTORY: Cough, tachycardia     Single view of the chest is obtained. The lungs are clear. Cardiac  silhouette is normal. Cardiac monitoring leads are present.       Impression:       Negative single view chest.  This report was finalized on 03/18/2017 19:38 by Dr. Faraz Browning MD.    CT Angiogram Chest With Contrast [[id number]] Collected:  03/18/17 2024     Updated:  03/18/17 2111    Narrative:       EXAMINATION:   CT ANGIOGRAM CHEST W CONTRAST-  3/18/2017 8:19 PM CDT CT  chest  angiogram dated 3/18/2017 7:58 PM CDT      HISTORY: SVT     COMPARISON: None.      DLP: 803 mGy cm. Automated exposure control was also utilized to  decrease patient radiation dose.     TECHNIQUE: Helical tomographic images of the chest were obtained after  the administration of intravenous contrast following angiogram protocol.  Additionally, 3D MIP reconstructions in the coronal and sagittal planes  were provided.        FINDINGS:    The imaged portion of the base of the neck appears unremarkable.      There is adequate enhancement of the pulmonary arteries to evaluate for  central and segmental pulmonary emboli. There are no filling defects  within the main, lobar, segmental or visualized subsegmental pulmonary  arteries. The pulmonary vessels are within normal limits.      The lungs are clear without pleural effusion, consolidation, nodule, or  mass lesion. The trachea and bronchial tree are patent.      The heart and great vessels appear unremarkable. There is no pericardial  effusion.      No enlarged axillary or mediastinal lymph nodes are present.      The osseous structures of the thorax and surrounding soft tissues  demonstrate no acute process.      Surgical clips are present and associated with the liver. There may be  an adenoma associated with the right adrenal gland.        Impression:       1. No evidence of embolic disease.  2. Suspicious for adenoma associated with the right adrenal gland.     This report was finalized on 03/18/2017 21:07 by Dr. Faraz Browning MD.          I have reviewed the patient current medications.     Assessment/Plan     Hospital Problem List     PSVT (paroxysmal supraventricular tachycardia)          1. Syncope. -possibly related to SVT. Other imaging and workup in progress  2. SVT. Started on low dose bb per cards. HR currently controlled.   3. Seizure disorder. Not on any AED. States she has had  A few seizures ever and never followed by a neurologist   4. Migraine  headache. Tells me she has tried multiple meds even AEDs, imitrex, narcotics, with no help. Currently has a HA. Can speak with dr oakes about other options. BB may help some. dont really want to give caffeine meds in setting of SVT  5. Elevated liver function tests of uncertain etiology. Follow up CMP in am  6. Hypertension.   7. Mildly elevated troponin-likely related to SVT. Trending down.              Discharge Planning: I expect patient to be discharged to  in 1 days.    Ila Garza MD   03/19/17   8:31 AM     Electronically signed by Ila Garza MD at 3/19/2017  1:41 PM           Consult Notes (last 72 hours) (Notes from 3/17/2017  9:18 AM through 3/20/2017  9:18 AM)      TAMMIE Cazares at 3/19/2017  9:33 AM  Version 1 of 1    Attestation signed by Vinayak Aguirre MD at 3/19/2017 12:45 PM        Patient was seen, evaluated, and examined with APRN.  I agree with assessment and plan as documented.  This note compliments hers.       Reason for consult: syncope, SVT    Vital signs reviewed:  Gen: A&Ox3, NAD  CV: RRR w/o m/r/g  Chest: CTA bilateral, normal respiratory effort    Labs and diagnostic studies reviewed    Assessment:  1.  Paroxysmal supraventricular tachycardia: Heart rate at 197, concerning for reentry tachycardia, symptomatic, resolved with adenosine.  2.  Syncope: Possibly secondary to SVT, also has a history of seizure disorder  3.  History of seizure disorder  4.  History of headaches: Cluster versus migraine      Plan:  - Echo pending  - Continue to monitor on telemetry  - Continue metoprolol, increased to 25 mg  - Will likely need EP evaluation after discharge                                 [facility] CONSULT NOTE    Referring Provider: Alan Gutierrez MD    Reason for Consultation: Syncope, SVT, elevated Troponin     Chief Complaint   Patient presents with   • Rapid Heart Rate       Subjective .     History of present illness:  Ana Small is a  "51 y.o. female with a known PMH significant for reported seizure disorder (not followed by a Neurologist, not on any antiepileptics), migraines, TIA, and recent Influenza diagnosis, who presented to [facility]18 with complaints of syncope with collapse x2 and palpitations. She reports that yesterday morning she was walking to mailbox and upon returning to the house she had her first syncopal event and was reportedly unconscious \"for a few seconds\". Prior to the event, she reports \"darkening of her vision\" and palpitations. She had a second similar episode 2 hours later, this time with associated severe neck pain, \"like someone was stabbing me\". ECG in the ED revealed SVT with a heart rate in the 190's. Adenosine was administered. She was admitted to [facility] and admitted to the hospitalist team to 4B telemetry with a Cardiology consult. Troponin 0.11-->0.158-->0.13. Denies previous cardiac history. She states that she has not seen a Neurologist in the past for her reported seizures due to \"not wanting her license to be taken away\".     History  Past Medical History   Diagnosis Date   • Disease of thyroid gland    • Migraines    • Seizures    • Stroke    • TIA (transient ischemic attack)    ,   Past Surgical History   Procedure Laterality Date   • Liver resection     •  section     • Thyroid surgery     • Parathyroid gland surgery     • Hemangioma excision       from liver.    ,   History reviewed. No pertinent family history.,   Social History   Substance Use Topics   • Smoking status: Current Every Day Smoker     Packs/day: 0.50   • Smokeless tobacco: None   • Alcohol use No   ,     Medications  Current Facility-Administered Medications   Medication Dose Route Frequency Provider Last Rate Last Dose   • acetaminophen (TYLENOL) tablet 650 mg  650 mg Oral Q6H PRN Alan Gutierrez MD       • aspirin EC tablet 81 mg  81 mg Oral Daily Alan Gutierrez MD   81 mg at 17 0908   • " enoxaparin (LOVENOX) syringe 40 mg  40 mg Subcutaneous Nightly Alan Gutierrez MD       • HYDROcodone-acetaminophen (NORCO) 7.5-325 MG per tablet 1 tablet  1 tablet Oral Q4H PRN Alan Gutierrez MD       • ipratropium-albuterol (DUO-NEB) nebulizer solution 3 mL  3 mL Nebulization Q4H PRN Alan Gutierrez MD       • metoprolol tartrate (LOPRESSOR) tablet 12.5 mg  12.5 mg Oral Q12H Alan Gutierrez MD   12.5 mg at 03/19/17 0908   • nitroglycerin (NITROSTAT) SL tablet 0.4 mg  0.4 mg Sublingual Q5 Min PRN Alan Gutierrez MD       • ondansetron (ZOFRAN) injection 4 mg  4 mg Intravenous Q6H PRN Alan Gutierrez MD   4 mg at 03/19/17 0219   • oseltamivir (TAMIFLU) capsule 75 mg  75 mg Oral Q12H Alan Gutierrez MD   75 mg at 03/19/17 0906   • pantoprazole (PROTONIX) EC tablet 40 mg  40 mg Oral Q AM Alan Gutierrez MD   40 mg at 03/19/17 0634   • sodium chloride 0.9 % flush 1-10 mL  1-10 mL Intravenous PRN Alan Gutierrez MD       • sodium chloride 0.9 % flush 10 mL  10 mL Intravenous PRN Jamel Carrion Jr., MD   10 mL at 03/18/17 2141   • sodium chloride 0.9 % infusion  50 mL/hr Intravenous Continuous Alan Gutierrez MD 50 mL/hr at 03/19/17 0908 50 mL/hr at 03/19/17 0908       Allergies:  Sulfa antibiotics and Latex    Review of Systems  Review of Systems   Constitution: Positive for weakness and malaise/fatigue. Negative for diaphoresis and fever.   HENT: Positive for headaches.    Cardiovascular: Positive for irregular heartbeat, palpitations and syncope (x2). Negative for chest pain, leg swelling, orthopnea and paroxysmal nocturnal dyspnea.   Respiratory: Negative for cough, shortness of breath, sputum production and wheezing.    Skin: Negative for rash.   Musculoskeletal: Positive for falls (r/t syncope).   Gastrointestinal: Negative for bloating, abdominal pain, nausea and vomiting.   Neurological: Negative for focal weakness.       Objective     Physical Exam:  Patient Vitals for the past 24 hrs:   BP Temp Temp src Pulse Resp SpO2 Height  "Weight   03/19/17 0746 132/85 97.2 °F (36.2 °C) Temporal Art 76 18 95 % - -   03/19/17 0300 - - - 78 18 93 % - -   03/18/17 2353 133/97 97.8 °F (36.6 °C) - 88 18 97 % - -   03/18/17 2331 128/89 - - 85 - 92 % - -   03/18/17 2317 130/90 98 °F (36.7 °C) - 80 16 94 % - -   03/18/17 2301 129/92 - - 84 - 92 % - -   03/18/17 2232 142/88 - - 79 - 96 % - -   03/18/17 2216 141/98 - - 82 - 95 % - -   03/18/17 2201 128/96 - - 86 - 92 % - -   03/18/17 2146 135/93 - - 87 - 94 % - -   03/18/17 2131 124/87 - - 87 - 93 % - -   03/18/17 2116 109/81 - - 89 - 94 % - -   03/18/17 2102 111/78 - - 100 - 94 % - -   03/18/17 2048 115/83 - - 110 - 96 % - -   03/18/17 2031 - - - 111 - 96 % - -   03/18/17 1936 - 98.5 °F (36.9 °C) Oral - - - - -   03/18/17 1931 138/93 - - 115 - 97 % - -   03/18/17 1916 142/82 - - 112 - 98 % - -   03/18/17 1902 128/87 - - 114 - 98 % - -   03/18/17 1847 115/56 - - 112 - 96 % - -   03/18/17 1832 118/80 - - (!) 185 - 98 % - -   03/18/17 1821 91/52 - - (!) 192 - 100 % - -   03/18/17 1818 - - - (!) 185 - 99 % 63\" (160 cm) 252 lb (114 kg)   03/18/17 1810 91/52 - - - 18 - - -     Physical Exam   Constitutional: She appears well-developed and well-nourished. She is cooperative.   Cardiovascular: Normal rate, regular rhythm, S1 normal, S2 normal and intact distal pulses.  murmur heard.  Telemetry: SR 73-92 BPM, no further SVT overnight    Pulmonary/Chest: Effort normal and breath sounds normal. No accessory muscle usage. No respiratory distress.   Abdominal: Soft. Normal appearance and bowel sounds are normal. She exhibits no distension. There is no tenderness.   Neurological: She is alert.   Psychiatric: She has a normal mood and affect. Her speech is normal and behavior is normal. reviewed.      Results Review:   I reviewed the patient's new clinical results.  Lab Results   Component Value Date    WBC 6.19 03/19/2017    HGB 13.8 03/19/2017    HCT 42.0 03/19/2017    MCV 89.9 03/19/2017     03/19/2017     Lab " Results   Component Value Date    GLUCOSE 116 (H) 03/19/2017    CALCIUM 9.8 03/19/2017     03/19/2017    K 4.3 03/19/2017    CO2 24.0 03/19/2017     03/19/2017    BUN 14 03/19/2017    CREATININE 0.84 03/19/2017    EGFRIFNONA 71 03/19/2017    BCR 16.7 03/19/2017    ANIONGAP 7.0 03/19/2017     Lab Results   Component Value Date    TROPONINI 0.130 (H) 03/19/2017     Lab Results   Component Value Date    TSH 2.980 03/18/2017         Imaging Results (last 72 hours)     Procedure Component Value Units Date/Time    XR Chest 1 View [[id number]] Collected:  03/18/17 1843     Updated:  03/18/17 1941    Narrative:       EXAMINATION:   XR CHEST 1 VW-  3/18/2017 6:42 PM CDT     HISTORY: Cough, tachycardia     Single view of the chest is obtained. The lungs are clear. Cardiac  silhouette is normal. Cardiac monitoring leads are present.       Impression:       Negative single view chest.  This report was finalized on 03/18/2017 19:38 by Dr. Faraz Browning MD.    CT Angiogram Chest With Contrast [[id number]] Collected:  03/18/17 2024     Updated:  03/18/17 2111    Narrative:       EXAMINATION:   CT ANGIOGRAM CHEST W CONTRAST-  3/18/2017 8:19 PM CDT CT  chest angiogram dated 3/18/2017 7:58 PM CDT      HISTORY: SVT     COMPARISON: None.      DLP: 803 mGy cm. Automated exposure control was also utilized to  decrease patient radiation dose.     TECHNIQUE: Helical tomographic images of the chest were obtained after  the administration of intravenous contrast following angiogram protocol.  Additionally, 3D MIP reconstructions in the coronal and sagittal planes  were provided.        FINDINGS:    The imaged portion of the base of the neck appears unremarkable.      There is adequate enhancement of the pulmonary arteries to evaluate for  central and segmental pulmonary emboli. There are no filling defects  within the main, lobar, segmental or visualized subsegmental pulmonary  arteries. The pulmonary vessels are within normal  limits.      The lungs are clear without pleural effusion, consolidation, nodule, or  mass lesion. The trachea and bronchial tree are patent.      The heart and great vessels appear unremarkable. There is no pericardial  effusion.      No enlarged axillary or mediastinal lymph nodes are present.      The osseous structures of the thorax and surrounding soft tissues  demonstrate no acute process.      Surgical clips are present and associated with the liver. There may be  an adenoma associated with the right adrenal gland.        Impression:       1. No evidence of embolic disease.  2. Suspicious for adenoma associated with the right adrenal gland.     This report was finalized on 03/18/2017 21:07 by Dr. aFraz Browning MD.            Active Problems:    -PSVT (paroxysmal supraventricular tachycardia)- no further events overnight since starting Metoprolol.      -Syncope and collapse x2- SVT vs possible seizure     -Reported seizure disorder- not followed by a Neurologist and not on any antiepileptic medications     Plan   1. Monitor telemetry  2. Continue PO Metoprolol- will increase as needed/tolerated   3. IV Lopressor PRN   4. Consider Holter monitor or event monitor at discharge   5. Echo-pending  6. Carotid US- pending   7. Neurology has also been consulted due to her syncopal event and reported seizure disorder     Further orders per Dr. Aguirre upon his evaluation of the patient.     Thank you for the consultation, cardiology will gladly continue to follow.     TAMMIE Cazares        Please note this cardiology consultation note is the result of a face to face consultation with the patient, in addition to reviewing medical records at length by myself, TAMMIE Lee.     Time: More than 50% of time spent in counseling and coordination of care:  Total face-to-face/floor time 40 min.  Time spent in counseling 20 min. Counseling included the following topics: reviewing medical records, lab results,  assessment, discussing the plan of care        Electronically signed by Vinayak Aguirre MD at 3/19/2017 12:45 PM      Nam Langley MD at 3/19/2017  2:10 PM  Version 1 of 1     Consult Orders:    1. Inpatient Consult to Neurology [[id number]] ordered by Alan Gutierrez MD at 03/19/17 0200                Neurology Consult Note    Referring Provider: Dr. Garza  Reason for Consultation: syncope      History of present illness:      This is a 51-year-old right-handed obese  female who presents with a case of syncope.  She was walking back from the mailbox yesterday with no preceding symptoms and lost consciousness suddenly.  She did fall to the ground.  Her loss of consciousness lasted only approximately 15-30 seconds.  There was no tongue biting and no incontinence.  She woke almost immediately with no postictal state.  There was no witnessed seizure activity.  She does feel as if she has some tachycardia that day.  Since being admitted there has been other signs tachycardia as well.    She reports that for the past 4-5 years she has had other spells associated with passing out with a severe bilateral simple headache.  When she passes out there has been some tremulous activity of unclear duration.  There is been no tongue biting and no incontinence associated with those.  She has no family history of seizure disorder and has no history of previous head trauma.  She has never been diagnosed with a seizure disorder.  She is never been on an antiepileptic for seizures but has been tried on multiple antiepileptics in the past secondary to medication refractory migraines.  Example that she is tried Topamax in the past but had cognitive slowing and therefore did not tolerated.    He has almost daily headaches that are holocephalic in nature with light sensitivity.  Past Medical History   Diagnosis Date   • Disease of thyroid gland    • Migraines    • Seizures    • Stroke    • TIA (transient ischemic attack)         Allergies   Allergen Reactions   • Sulfa Antibiotics Anaphylaxis   • Latex      No current facility-administered medications on file prior to encounter.      Current Outpatient Prescriptions on File Prior to Encounter   Medication Sig   • HYDROcodone-acetaminophen (NORCO) 7.5-325 MG per tablet Take 1 tablet by mouth Every 4 (Four) Hours As Needed for moderate pain (4-6).   • oseltamivir (TAMIFLU) 75 MG capsule Take 1 capsule by mouth 2 (Two) Times a Day.       Social History     Social History   • Marital status:      Spouse name: N/A   • Number of children: N/A   • Years of education: N/A     Occupational History   • Not on file.     Social History Main Topics   • Smoking status: Current Every Day Smoker     Packs/day: 0.50   • Smokeless tobacco: Not on file   • Alcohol use No   • Drug use: No   • Sexual activity: Defer     Other Topics Concern   • Not on file     Social History Narrative     History reviewed. No pertinent family history.    Review of Systems  A 14 point review of systems was reviewed and was negative except for syncope and headache    Vital Signs   Temp:  [97.2 °F (36.2 °C)-98.5 °F (36.9 °C)] 97.2 °F (36.2 °C)  Heart Rate:  [] 76  Resp:  [16-18] 18  BP: ()/(52-98) 132/85    General Exam:  Head:  Normal cephalic, atraumatic  HEENT:  Neck supple  Fundoscopic Exam:  No signs of disc edema  CVS:  Regular rate and rhythm.  No murmurs  Carotid Examination:  No bruits  Lungs:  Clear to auscultation  Abdomen:  Non-tender, Non-distended  Extremities:  No signs of peripheral edema  Skin:  No rashes    Neurologic Exam:    Mental Status:    -Awake, Alert, Oriented X 3  -No word finding difficulties  -No aphasia  -No dysarthria  -Follows simple and complex commands    CN II:  Visual fields full.  Pupils equally reactive to light  CN III, IV, VI:  Extraocular Muscles full with no signs of nystagmus  CN V:  Facial sensory is symmetric with no asymetries.  CN VII:  Facial motor  symmetric  CN VIII:  Gross hearing intact bilaterally  CN IX:  Palate elevates symmetrically  CN X:  Palate elevates symmetrically  CN XI:  Shoulder shrug symmetric  CN XII:  Tongue is midline on protrusion    Motor: (strength out of 5:  1= minimal movement, 2 = movement in plane of gravity, 3 = movement against gravity, 4 = movement against some resistance, 5 = full strength)    -Right Upper Ext: Proximal: 5 Distal: 5  -Left Upper Ext: Proximal: 5 Distal: 5    -Right Lower Ext: Proximal: 5 Distal: 5  -Left Lower Ext: Proximal: 5 Distal: 5    DTR:  -Right   Bicep: 2+ Tricep: 2+ Brachoradialis: 2+   Patella: 2+ Ankle: 2+ Neg Babinski  -Left   Bicep: 2+ Tricep: 2+ Brachoradialis: 2+   Patella: 2+ Ankle: 2+ Neg Babinski    Sensory:  -Intact to light touch, pinprick, temperature, pain, and proprioception    Coordination:  -Finger to nose intact  -Heel to shin intact  -No ataxia    Gait  -No signs of ataxia  -ambulates unassisted      Results Review:  Lab Results (last 24 hours)     Procedure Component Value Units Date/Time    POC Troponin, Rapid [[id number]]  (Normal) Collected:  03/18/17 1835    Specimen:  Blood Updated:  03/18/17 1850     Troponin I 0.00 ng/mL       Serial Number: [id number]    : [id number]       D-dimer, Quantitative [[id number]]  (Abnormal) Collected:  03/18/17 1826    Specimen:  Blood Updated:  03/18/17 1944     D-Dimer, Quantitative 1.11 (H) mg/L (FEU)     Narrative:       Reference Range is 0-0.50 mg/L FEU. However, results <0.50 mg/L FEU tends to rule out DVT or PE. Results >0.50 mg/L FEU are not useful in predicting absence or presence of DVT or PE.    Comprehensive Metabolic Panel [[id number]]  (Abnormal) Collected:  03/18/17 1826    Specimen:  Blood Updated:  03/18/17 1944     Glucose 138 (H) mg/dL      BUN 19 mg/dL      Creatinine 1.04 mg/dL      Sodium 136 mmol/L      Potassium 4.3 mmol/L      Chloride 102 mmol/L      CO2 19.0 (L) mmol/L      Calcium 10.2 mg/dL      Total Protein 7.1 g/dL       Albumin 4.00 g/dL      ALT (SGPT) 73 (H) U/L      AST (SGOT) 48 (H) U/L      Alkaline Phosphatase 153 (H) U/L      Total Bilirubin 0.6 mg/dL      eGFR Non African Amer 55 (L) mL/min/1.73      Globulin 3.1 gm/dL      A/G Ratio 1.3 g/dL      BUN/Creatinine Ratio 18.3      Anion Gap 15.0 (H) mmol/L     BNP [[id number]]  (Normal) Collected:  03/18/17 1826    Specimen:  Blood Updated:  03/18/17 1944     proBNP 69.1 pg/mL     CBC & Differential [[id number]] Collected:  03/18/17 1826    Specimen:  Blood Updated:  03/18/17 1959    Narrative:       The following orders were created for panel order CBC & Differential.  Procedure                               Abnormality         Status                     ---------                               -----------         ------                     CBC Auto Differential[[id number]]         Abnormal            Final result                 Please view results for these tests on the individual orders.    CBC Auto Differential [[id number]]  (Abnormal) Collected:  03/18/17 1826    Specimen:  Blood Updated:  03/18/17 1959     WBC 10.26 10*3/mm3      RBC 4.88 10*6/mm3      Hemoglobin 14.6 g/dL      Hematocrit 43.7 %      MCV 89.5 fL      MCH 29.9 pg      MCHC 33.4 g/dL      RDW 14.1 %      RDW-SD 46.1 fl      MPV 10.8 fL      Platelets 412 (H) 10*3/mm3      Neutrophil % 57.8 %      Lymphocyte % 30.4 %      Monocyte % 9.2 %      Eosinophil % 1.8 %      Basophil % 0.4 %      Immature Grans % 0.4 %      Neutrophils, Absolute 5.94 10*3/mm3      Lymphocytes, Absolute 3.12 10*3/mm3      Monocytes, Absolute 0.94 10*3/mm3      Eosinophils, Absolute 0.18 10*3/mm3      Basophils, Absolute 0.04 10*3/mm3      Immature Grans, Absolute 0.04 (H) 10*3/mm3     Ethanol [[id number]]  (Normal) Collected:  03/18/17 1826    Specimen:  Blood Updated:  03/18/17 2002     Ethanol % <0.010 %     Narrative:       Not for legal purposes. Chain of Custody not followed.     TSH [[id number]]  (Normal) Collected:  03/18/17 1824     Specimen:  Blood Updated:  03/18/17 2006     TSH 2.980 mIU/mL     Urine Drug Screen [[id number]]  (Normal) Collected:  03/18/17 2001    Specimen:  Urine from Urine, Clean Catch Updated:  03/18/17 2106     Amphetamine Screen, Urine Negative      Barbiturates Screen, Urine Negative      Benzodiazepine Screen, Urine Negative      Cocaine Screen, Urine Negative      Methadone Screen, Urine Negative      Opiate Screen Negative      Phencyclidine (PCP), Urine Negative      THC, Screen, Urine Negative     Narrative:       Negative Thresholds For Drugs Screened in Urine:    Amphetamines          500 ng/ml  Barbiturates          200 ng/ml  Benzodiazepines       200 ng/ml  Cocaine               150 ng/ml  Methadone             150 ng/ml  Opiates               300 ng/ml  Phencyclidine         25 ng/ml  THC                      50 ng/ml    The normal value for all drugs tested is negative. This report includes final unconfirmed screening results.  A positive result by this assay can be, at your request, sent to the Reference Lab for confirmation by gas chromatography. Unconfirmed results must not be used for non-medical purposes, such as employment or legal testing. Clinical consideration should be applied to any drug of abuse test result, particularly when unconfirmed results are used.    POC Troponin, Rapid [[id number]]  (Abnormal) Collected:  03/18/17 2111    Specimen:  Blood Updated:  03/18/17 2125     Troponin I 0.11 (H) ng/mL       Serial Number: [id number]    : [id number]       Influenza Antigen [[id number]]  (Normal) Collected:  03/18/17 2225    Specimen:  Swab from Nasopharynx Updated:  03/18/17 2240     Influenza A Ag, EIA Negative      Influenza B Ag, EIA Negative     Narrative:         Recommend confirmation of negative results by viral culture or molecular assay.    [place] Draw [[id number]] Collected:  03/18/17 1826    Specimen:  Blood Updated:  03/18/17 2302    Narrative:       The following orders were created for  panel order [place] Draw.  Procedure                               Abnormality         Status                     ---------                               -----------         ------                     Light Blue Top[[id number]]                                    Final result               Green Top (Gel)[[id number]]                                   Final result               Lavender Top[[id number]]                                      Final result               Red Top[[id number]]                                           Final result               Green Top (No Gel)[[id number]]                                                             Please view results for these tests on the individual orders.    Light Blue Top [[id number]] Collected:  03/18/17 1826    Specimen:  Blood Updated:  03/18/17 2302     Extra Tube hold for add-on       Auto resulted       Green Top (Gel) [[id number]] Collected:  03/18/17 1826    Specimen:  Blood Updated:  03/18/17 2302     Extra Tube Hold for add-ons.       Auto resulted.       Lavender Top [[id number]] Collected:  03/18/17 1826    Specimen:  Blood Updated:  03/18/17 2302     Extra Tube hold for add-on       Auto resulted       Red Top [[id number]] Collected:  03/18/17 1826    Specimen:  Blood Updated:  03/18/17 2302     Extra Tube Hold for add-ons.       Auto resulted.       CBC Auto Differential [[id number]]  (Normal) Collected:  03/19/17 0012    Specimen:  Blood Updated:  03/19/17 0022     WBC 7.76 10*3/mm3      RBC 4.76 10*6/mm3      Hemoglobin 14.2 g/dL      Hematocrit 42.6 %      MCV 89.5 fL      MCH 29.8 pg      MCHC 33.3 g/dL      RDW 14.2 %      RDW-SD 46.1 fl      MPV 9.9 fL      Platelets 339 10*3/mm3      Neutrophil % 64.5 %      Lymphocyte % 25.4 %      Monocyte % 7.2 %      Eosinophil % 2.3 %      Basophil % 0.3 %      Immature Grans % 0.3 %      Neutrophils, Absolute 5.01 10*3/mm3      Lymphocytes, Absolute 1.97 10*3/mm3      Monocytes, Absolute 0.56 10*3/mm3      Eosinophils, Absolute  0.18 10*3/mm3      Basophils, Absolute 0.02 10*3/mm3      Immature Grans, Absolute 0.02 10*3/mm3     Troponin [[id number]]  (Abnormal) Collected:  03/19/17 0013    Specimen:  Blood Updated:  03/19/17 0045     Troponin I 0.158 (H) ng/mL     CBC (No Diff) [[id number]]  (Abnormal) Collected:  03/19/17 0402    Specimen:  Blood Updated:  03/19/17 0416     WBC 6.19 10*3/mm3      RBC 4.67 10*6/mm3      Hemoglobin 13.8 g/dL      Hematocrit 42.0 %      MCV 89.9 fL      MCH 29.6 pg      MCHC 32.9 (L) g/dL      RDW 14.4 %      RDW-SD 47.0 fl      MPV 9.8 fL      Platelets 322 10*3/mm3     Basic Metabolic Panel [[id number]]  (Abnormal) Collected:  03/19/17 0402    Specimen:  Blood Updated:  03/19/17 0434     Glucose 116 (H) mg/dL      BUN 14 mg/dL      Creatinine 0.84 mg/dL      Sodium 138 mmol/L      Potassium 4.3 mmol/L      Chloride 107 mmol/L      CO2 24.0 mmol/L      Calcium 9.8 mg/dL      eGFR Non African Amer 71 mL/min/1.73      BUN/Creatinine Ratio 16.7      Anion Gap 7.0 mmol/L     Narrative:       GFR Normal >60  Chronic Kidney Disease <60  Kidney Failure <15    Troponin [[id number]]  (Abnormal) Collected:  03/19/17 0402    Specimen:  Blood Updated:  03/19/17 0438     Troponin I 0.130 (H) ng/mL     Troponin [[id number]]  (Abnormal) Collected:  03/19/17 1041    Specimen:  Blood Updated:  03/19/17 1126     Troponin I 0.070 (H) ng/mL           .  Imaging Results (last 24 hours)     Procedure Component Value Units Date/Time    XR Chest 1 View [[id number]] Collected:  03/18/17 1843     Updated:  03/18/17 1941    Narrative:       EXAMINATION:   XR CHEST 1 VW-  3/18/2017 6:42 PM CDT     HISTORY: Cough, tachycardia     Single view of the chest is obtained. The lungs are clear. Cardiac  silhouette is normal. Cardiac monitoring leads are present.       Impression:       Negative single view chest.  This report was finalized on 03/18/2017 19:38 by Dr. Faraz Browning MD.    CT Angiogram Chest With Contrast [[id number]] Collected:  03/18/17  2024     Updated:  03/18/17 2111    Narrative:       EXAMINATION:   CT ANGIOGRAM CHEST W CONTRAST-  3/18/2017 8:19 PM CDT CT  chest angiogram dated 3/18/2017 7:58 PM CDT      HISTORY: SVT     COMPARISON: None.      DLP: 803 mGy cm. Automated exposure control was also utilized to  decrease patient radiation dose.     TECHNIQUE: Helical tomographic images of the chest were obtained after  the administration of intravenous contrast following angiogram protocol.  Additionally, 3D MIP reconstructions in the coronal and sagittal planes  were provided.        FINDINGS:    The imaged portion of the base of the neck appears unremarkable.      There is adequate enhancement of the pulmonary arteries to evaluate for  central and segmental pulmonary emboli. There are no filling defects  within the main, lobar, segmental or visualized subsegmental pulmonary  arteries. The pulmonary vessels are within normal limits.      The lungs are clear without pleural effusion, consolidation, nodule, or  mass lesion. The trachea and bronchial tree are patent.      The heart and great vessels appear unremarkable. There is no pericardial  effusion.      No enlarged axillary or mediastinal lymph nodes are present.      The osseous structures of the thorax and surrounding soft tissues  demonstrate no acute process.      Surgical clips are present and associated with the liver. There may be  an adenoma associated with the right adrenal gland.        Impression:       1. No evidence of embolic disease.  2. Suspicious for adenoma associated with the right adrenal gland.     This report was finalized on 03/18/2017 21:07 by Dr. Faraz Browning MD.              Impression    1.  Nonneurologic syncope  2.  No history of a formal diagnosis of epilepsy but subjective history of tremulous spells associated with syncope previously  3.  Medication refractory migraines    Plan    1.  No further neurologic workup for syncope spell that occurred yesterday as I  do not believe that this is neurologic.  2.  Will need an EEG and MR of brain with and without contrast to be done either while she is here or as an outpatient  3.  Follow-up in outpatient clinic for further workup of previous spells in addition to the possibility of botulinum toxin injection for medication refractory migraines.    I discussed the patients findings and my recommendations with patient    Nam Langley MD  03/19/17  2:11 PM         Electronically signed by Nam Langley MD at 3/19/2017  2:16 PM

## 2017-03-20 NOTE — DISCHARGE SUMMARY
[facility] Medicine Services  DISCHARGE SUMMARY       Date of Admission: 3/18/2017  Date of Discharge:  3/20/2017  Primary Care Physician: No Known Provider    Presenting Problem/History of Present Illness:  PSVT (paroxysmal supraventricular tachycardia) [I47.1]  PSVT (paroxysmal supraventricular tachycardia) [I47.1]     Final Discharge Diagnoses:  Hospital Problem List     PSVT (paroxysmal supraventricular tachycardia)    Syncope and collapse          Consults: cardiology, neurology    Procedures Performed: EEG, echo    Pertinent Test Results: EEG pending,  echo with EF of 60%, most recent EKG normal sinus rhythm at 84 bpm, carotid ultrasound    Chief Complaint on Day of Discharge: tired    History of Present Illness on Day of Discharge: Patient denies any symptoms of palpitations, chest pain, shortness of breath or any further syncopal type episodes.  Still has a headache however this is chronic and ongoing for her.  Feels somewhat tired and sleepy however patient overall is feeling okay.    Hospital Course:  The patient is a 51 y.o. female who presented to [facility] after 2 syncopal episodes.  The patient states that she did actually fall to the ground hitting her head and had loss of consciousness.  She denies immensely this in the past however apparently does have some ongoing issues with questionable episodes.  She has never formally been diagnosed with seizure activity however she has been told her activity at [place] seizure-like.  These episodes were not similar to her previous events.  She has not been seen and diagnosed as she is had issues with insurance as well as multiple moves.  She denied true chest pain but she did have significant chest palpitations and shortness of breath.  In the ER she had an EKG consistent with SVT with a heart rate as fast as 190s. .  Patient was given adenosine in the ER and maintaining normal sinus rhythm.  During her  "hospitalization cardiology was consulted.  She was started on a beta blocker and again maintain normal sinus rhythm and is asymptomatic.  Neurology was consulted in regards to her questionable seizure type disorder.  An EEG was ordered with results pending.  An MRI was ordered however open MRI will have to be done as an outpatient.  Echo was unremarkable.  Carotids were normal.  Labs were also fairly unremarkable other than a mildly elevated troponin that was trending down and likely secondary to her significant tachycardia on arrival.  Patient remained asymptomatic again other than a headache of which she suffers from chronic ongoing cluster type headaches.  Again neurology is working her up for this and is going to try to arrange outpatient Botox for migraine headaches.  She will have a follow-up in 2 weeks with neurology.  She is being seen as a new patient by Dr. Ellison and patient is going to schedule this point.  She will also follow-up with cardiology as well as electrophysiologist.  She will continue her beta blocker.  All of this plan was discussed with patient and  prior to her being discharged.    Condition on Discharge:  good    Physical Exam on Discharge:  Visit Vitals   • /89 (BP Location: Right arm, Patient Position: Lying)   • Pulse 85   • Temp 98.1 °F (36.7 °C) (Temporal Artery )   • Resp 18   • Ht 63\" (160 cm)   • Wt 250 lb 12.8 oz (114 kg)   • SpO2 96%   • BMI 44.43 kg/m2     Physical Exam   Constitutional: She is oriented to person, place, and time. She appears well-developed and well-nourished.   HENT:   Head: Normocephalic and atraumatic.   Eyes: Conjunctivae and EOM are normal. Pupils are equal, round, and reactive to light.   Neck: Neck supple. No JVD present. No thyromegaly present.   Cardiovascular: Normal rate, regular rhythm, normal heart sounds and intact distal pulses.  Exam reveals no gallop and no friction rub.    No murmur heard.  Pulmonary/Chest: Effort normal and " breath sounds normal. No respiratory distress. She has no wheezes. She has no rales. She exhibits no tenderness.   Abdominal: Soft. Bowel sounds are normal. She exhibits no distension. There is no tenderness. There is no rebound and no guarding.   Musculoskeletal: Normal range of motion. She exhibits no edema, tenderness or deformity.   Lymphadenopathy:     She has no cervical adenopathy.   Neurological: She is alert and oriented to person, place, and time. She displays normal reflexes. No cranial nerve deficit. She exhibits normal muscle tone.   Skin: Skin is warm and dry. No rash noted.   Psychiatric: Her behavior is normal. Judgment and thought content normal.   Very flat affect         Discharge Disposition:  Home or Self Care    Discharge Medications:   Ana Small   Home Medication Instructions OMAR:[id number]    Printed on:03/20/17 1124   Medication Information                      HYDROcodone-acetaminophen (NORCO) 7.5-325 MG per tablet  Take 1 tablet by mouth Every 4 (Four) Hours As Needed for moderate pain (4-6).             metoprolol tartrate (LOPRESSOR) 25 MG tablet  Take 1 tablet by mouth Every 12 (Twelve) Hours.                 Discharge Diet:   Diet Instructions     Advance Diet As Tolerated       Limited excessive caffeine intake                 Activity at Discharge:   Activity Instructions     Activity as Tolerated                     Discharge Care Plan/Instructions:     Follow-up Appointments:   Future Appointments  Date Time Provider Department Center   3/20/2017 3:15 PM [facility] MRI 1  PAD MRI PAD   3/28/2017 12:00 PM PAD [facility] MRI 1  PAD MR BI PAD   4/4/2017 9:00 AM TAMMIE Nicole [facility] None       Test Results Pending at Discharge: EEG    Ila Garza MD  03/20/17  11:29 AM    Time: 45min

## 2017-03-20 NOTE — PROGRESS NOTES
Neurology Progress Note      Chief Complaint:  syncope    Subjective     Subjective:    No events overnight.  Mild chronic headache.  No seizure activity.  Patient could not tolerate MRI secondary to claustrophobia  Medications:  Current Facility-Administered Medications   Medication Dose Route Frequency Provider Last Rate Last Dose   • acetaminophen (TYLENOL) tablet 650 mg  650 mg Oral Q6H PRN Alan Gutierrez MD   650 mg at 03/20/17 0546   • aspirin EC tablet 81 mg  81 mg Oral Daily Alan Gutierrez MD   81 mg at 03/20/17 0839   • enoxaparin (LOVENOX) syringe 40 mg  40 mg Subcutaneous Nightly Alan Gutierrez MD   40 mg at 03/19/17 2058   • ipratropium-albuterol (DUO-NEB) nebulizer solution 3 mL  3 mL Nebulization Q4H PRN Alan Gutierrez MD       • metoprolol tartrate (LOPRESSOR) injection 5 mg  5 mg Intravenous Q4H PRN TAMMIE Cazares       • metoprolol tartrate (LOPRESSOR) tablet 25 mg  25 mg Oral Q12H Vinayak Aguirre MD   25 mg at 03/20/17 0839   • nitroglycerin (NITROSTAT) SL tablet 0.4 mg  0.4 mg Sublingual Q5 Min PRN Alan Gutierrez MD       • ondansetron (ZOFRAN) injection 4 mg  4 mg Intravenous Q6H PRN Alan Gutierrez MD   4 mg at 03/19/17 2145   • oseltamivir (TAMIFLU) capsule 75 mg  75 mg Oral Q12H Alan Gutierrez MD   75 mg at 03/20/17 0839   • pantoprazole (PROTONIX) EC tablet 40 mg  40 mg Oral Q AM Alan Gutierrez MD   40 mg at 03/20/17 0546   • sodium chloride 0.9 % flush 1-10 mL  1-10 mL Intravenous PRN Alan Gutierrez MD       • sodium chloride 0.9 % flush 10 mL  10 mL Intravenous PRN Jamel Carrion Jr., MD   10 mL at 03/18/17 2141   • sodium chloride 0.9 % infusion  50 mL/hr Intravenous Continuous Alan Gutierrez MD 50 mL/hr at 03/19/17 0908 50 mL/hr at 03/19/17 0908       Review of Systems:   -A 14 point review of systems is completed and is negative except for headache      Objective      Vital Signs  Temp:  [97.7 °F (36.5 °C)] 97.7 °F (36.5 °C)  Heart Rate:  [72] 72  Resp:  [16] 16  BP: (115)/(69)  115/69    Physical Exam:    General Exam:  Head:  Normal cephalic, atraumatic  HEENT:  Neck supple  Fundoscopic Exam:  No signs of disc edema  CVS:  Regular rate and rhythm.  No murmurs  Carotid Examination:  No bruits  Lungs:  Clear to auscultation  Abdomen:  Non-tender, Non-distended  Extremities:  No signs of peripheral edema  Skin:  No rashes    Neurologic Exam:    Mental Status:    -Awake, Alert, Oriented X 3  -No word finding difficulties  -No aphasia  -No dysarthria  -Follows simple and complex commands    CN II:  Visual fields full.  Pupils equally reactive to light  CN III, IV, VI:  Extraocular Muscles full with no signs of nystagmus  CN V:  Facial sensory is symmetric with no asymetries.  CN VII:  Facial motor symmetric  CN VIII:  Gross hearing intact bilaterally  CN IX:  Palate elevates symmetrically  CN X:  Palate elevates symmetrically  CN XI:  Shoulder shrug symmetric  CN XII:  Tongue is midline on protrusion    Motor: (strength out of 5:  1= minimal movement, 2 = movement in plane of gravity, 3 = movement against gravity, 4 = movement against some resistance, 5 = full strength)    -Right Upper Ext: Proximal: 5 Distal: 5  -Left Upper Ext: Proximal: 5 Distal: 5    -Right Lower Ext: Proximal: 5 Distal: 5  -Left Lower Ext: Proximal: 5 Distal: 5    DTR:  -Right   Bicep: 2+ Tricep: 2+ Brachoradialis: 2+   Patella: 2+ Ankle: 2+ Neg Babinski  -Left   Bicep: 2+ Tricep: 2+ Brachoradialis: 2+   Patella: 2+ Ankle: 2+ Neg Babinski    Sensory:  -Intact to light touch, pinprick, temperature, pain, and proprioception    Coordination:  -Finger to nose intact  -Heel to shin intact  -No ataxia    Gait  -No signs of ataxia  -ambulates unassisted       Results Review:    I reviewed the patient's new clinical results.    EEG pending    Assessment/Plan     Hospital Problem List    Active Problems:    PSVT (paroxysmal supraventricular tachycardia)    Syncope and collapse    Impression:  1.  Non-neurologic syncope  2.   Previous seizure like spells  3.  Chronic migraines    Plan:    1.  EEG completed and results pending  2.  MR Brain on closed scanner not possible, will arrange open MRI as outpatient  3.  Will arrange follow up in clinic in 2 weeks where will follow up MRI, EEG result, and apply for Botox    Neuro sign off as inpatient.  Nam Langley MD  03/20/17  10:11 AM

## 2017-03-20 NOTE — PLAN OF CARE
Problem: Patient Care Overview (Adult)  Goal: Plan of Care Review  Outcome: Ongoing (interventions implemented as appropriate)    03/19/17 1535 03/20/17 0343   Outcome Evaluation   Outcome Summary/Follow up Plan --  c/o headache persist. heart rate wnl. For MRI and EEG this am. no other c/o's voiced.   Coping/Psychosocial Response Interventions   Plan Of Care Reviewed With patient --    Patient Care Overview   Progress progress toward functional goals as expected --        Goal: Adult Individualization and Mutuality  Outcome: Ongoing (interventions implemented as appropriate)  Goal: Discharge Needs Assessment  Outcome: Ongoing (interventions implemented as appropriate)    Problem: Arrhythmia/Dysrhythmia (Symptomatic) (Adult)  Goal: Signs and Symptoms of Listed Potential Problems Will be Absent or Manageable (Arrhythmia/Dysrhythmia)  Outcome: Ongoing (interventions implemented as appropriate)

## 2017-03-20 NOTE — PAYOR COMM NOTE
"Ana Small (51 y.o. Female)     Date of Birth Social Security Number Address Home Phone MRN    1965  [address] [employer] [facility] [id number] [phone] [id number]    Anglican Marital Status          Samaritan        Admission Date Admission Type Admitting Provider Attending Provider Department, Room/Bed    3/18/17 Emergency Ila Garza MD  [facility] 4B, 442/1    Discharge Date Discharge Disposition Discharge Destination        3/20/2017 Home or Self Care             Attending Provider: (none)    Allergies:  Sulfa Antibiotics, Latex    Isolation:  None   Infection:  None   Code Status:  Prior    Ht:  63\" (160 cm)   Wt:  250 lb 12.8 oz (114 kg)    Admission Cmt:  None   Principal Problem:  None                Active Insurance as of 3/18/2017     Primary Coverage     Payor Plan Insurance Group Employer/Plan Group    [employer] BLUE [employer] BLUE [employer] PPO [id number]     Payor Plan Address Payor Plan Phone Number Effective From Effective To    [address] [phone] 1/1/2017     [place] [id number]       Subscriber Name Subscriber Birth Date Member ID       ABEL SMALL 1/29/1964 [id number]                 Emergency Contacts      (Rel.) Home Phone Work Phone Mobile Phone    Gil Small (Spouse) [phone] -- --               History & Physical      H&P signed by Alan Gutierrez MD at 3/19/2017  7:11 PM              TIME: 2:03 a.m.     HISTORY OF PRESENT ILLNESS: Ms. Small is a 51-year-old  female who presents to [facility] due to \"I blacked out.\" Ms. Small relates that she walked to her mailbox this morning and upon returning to the house fell to the ground unconscious for \"a few seconds.\" She then aroused herself, walked back to the house and then had a similar episode 2 hours later. After another period of time, her  insisted that she come to the hospital for evaluation. Ms. Small relates that she was recently " diagnosed with influenza. She was prescribed Tamiflu; however, she has not been using the medication as prescribed. In addition to syncope, she also relates having a poor appetite over the past few days. This evening she had an episode of what she describes as significant chest palpitations.  In the emergency department, she had an EKG consistent with SVT. She relates recurring difficulties with shortness of breath over the past few days. After admission, she also related experiencing intense abdominal pain; however, the abdominal pain lasted only for a few seconds, and she is not able to elaborate further. She relates having nausea, but no vomiting. In addition to the above, she also relates a severe headache. Apparently she has a history of migraine headache. Of note, she also has a history of seizure disorder; however, she has not been treated for such. It is my understanding that many of her problems were diagnosed in [place], and she and her family have moved around quite a bit over the past few yeasr and she has not had any sort of regular medical followup. Presently, she is resting comfortably, but relates ongoing headache.    REVIEW OF SYSTEMS: Otherwise unremarkable from a cardiovascular, pulmonary, gastrointestinal, genitourinary, neurologic, psychiatric, metabolic, and constitutional standpoint except as noted. She relates recent fatigue and weakness. She has had no fevers, chills or sweats. She has had a poor appetite. Her weight is stable. She has had no chest pain, but relates chest palpitations earlier this evening. She has had shortness of breath and lower extremity edema. She has had no orthopnea or cough. She has had wheezing. She has had no hemoptysis. She relates a brief episode of severe abdominal pain, which is now resolved completely. She has had nausea, but no vomiting. She has had no diarrhea, but has difficulties with chronic constipation. She has had no dysphagia or odynophagia. She has  had no hematemesis, hematochezia or melena. She has had no flank pain, pelvic pain, hematuria or dysuria. Note, the patient relates a recent history of dysuria which she attributes to a urinary tract infection. She has had no skin rashes, arthralgias, myalgias or swollen joints. She has had a severe ongoing and generalized headache that has been present since earlier yesterday. She has had no confusion or memory deficits. She relates at least 2 episodes of syncope. She has had no recent changes in her vision or hearing. She does relate that her hearing seems worse since being diagnosed with influenza. She has had no acute motor or sensory deficits. She has had no tremors or gait deficits. She does relate experiencing some dizziness. As noted, she had a fall yesterday morning.      PAST MEDICAL HISTORY:  1.  Hypertension.   2.  TIA.   3.  Seizure disorder (not treated).   4.  Large hepatic hemangioma.   5.  Obesity.   6.  Migraine headache.   7.  Gastroesophageal reflux disease.   8.  Hiatal hernia.   9.  Eosinophilic esophagitis.   10. Chronic constipation.   11. Medical noncompliance.     PAST SURGICAL HISTORY:  1.  Status post  on 2 occasions.   2.  Status post pilonidal cyst resection.   3.  Status post liver resection due to large hemangioma.   4.  Status post parathyroidectomy.   5.  Status post EGD.   6.  Status post esophageal stricture.     The patient specifically denies any history of psychiatric difficulties such as anxiety, depression, ADHD, bipolar, schizophrenia or substance abuse.     ALLERGIES:   1.  SULFA.  2.  LATEX.      HOME MEDICATIONS:  1.  Norco 7.5 p.o. q.4 h. p.r.n. for pain.  2.  Tamiflu 75 mg p.o. b.i.d.     SOCIAL HISTORY: Significant for being a resident of [place], [place]. She is . She has a son and 2 daughters reported to be in good health. She is not employed. She has a high school education. She attended vocational school to study . She smokes  3-4 cigarettes per day. She drinks beer on occasions, but not regularly. She denies any history of illicit drug use. She is Anglican. She has no recent history of travel outside of this region.     Her   will serve as a surrogate for healthcare matters should such become necessary.     CODE STATUS: She is a FULL CODE.     FAMILY HISTORY: Significant for having a brother and sister reported to be in good health, although her brother has a history of heart disease of uncertain type. Her mother  at 52 years of age due to liver cancer. Her father is living and has a history of COPD.     PHYSICAL EXAMINATION:    VITAL SIGNS: Temperature is 97.8, pulse is 88, respirations are 18 and unlabored, blood pressure is 133/97, O2 saturation is 97% breathing ambient air. Weight is 252 pounds.     GENERAL: This is a 51-year-old  female appearing her documented age. She appears to be resting comfortably in bed. She has a towel over her eyes. She relates ongoing headache. She is interactive and cooperative. She proves to be a fairly good historian, although elements of her history are fragmented. No family members are present.     HEAD AND NECK: Essentially unremarkable except as noted. I see no signs of acute trauma. Nose and throat are essentially unremarkable. There is no discharge from the nostrils. Mucous membranes are moist. Neck appears supple. She has no cervical or clavicular adenopathy. She has no definite carotid bruits. There are no masses of the head or neck. Neck veins do not appear pathologically distended.     CARDIAC: Reveals S1 and S2 with a regular rhythm. She has no definite murmurs, rubs or gallops.     LUNGS: Reveals bilateral breath sounds that are clear to auscultation throughout. She has no rales, wheezes or rhonchi.     ABDOMEN: Reveals bowel sounds to be present. Her abdomen is nontender, nondistended, soft, and obese.     EXTREMITIES: No significant lower extremity edema, erythema or  calf tenderness.     NEUROLOGIC: Reveals the patient to be awake and alert. Cranial nerves II-XII appear grossly intact. She exhibits no definite focal, motor or sensory deficits. She seems able to move all extremities without difficulty and at will. She is able to reposition herself in bed without assistance. Her gait was not tested.     PSYCHIATRIC: Reveals her mood to be stable. Affect is flat. Thought processes are organized, in that she is able to answer questions appropriately and provide a history, although the history is somewhat fragmented. Speech seems fluent. There is no flight of ideas. There are no obvious short-term or long-term memory deficits.     DIAGNOSTIC DATA:   1.  A complete metabolic panel demonstrates a sodium of 136, potassium 4.3, chloride 102, CO2 of 19, BUN 19, creatinine 1.04, glucose 138, total calcium 10.2.     2.  Liver function testing demonstrates an ALT of 73, AST 48, alkaline phosphatase level of 153.     3.  CBC is unremarkable.     4.  Influenza screen is negative.     5.  Urine drug screen is negative.     6.  ProBNP is 69.1.     7.  TSH is 2.98.     8.  D-dimer is 1.11.     9.  Initial troponin level is 0.11 with a followup troponin level of 0.158.     10. Chest x-ray demonstrates no acute abnormalities.     11. CTA of the chest demonstrates no evidence of embolic disease. There is some suspicion of an adenoma associated with the right adrenal gland.     12. EKG demonstrates SVT at the rate of 197 beats per minute.     13. Followup EKG demonstrates a sinus rhythm of 91 beats per minute.     13. A 3rd EKG demonstrates sinus rhythm of 84 beats per minute.     IMPRESSION:  1.  Syncope.   2.  SVT.   3.  Seizure disorder.   4.  Migraine headache.   5.  Elevated liver function tests of uncertain etiology.   6.  Hypertension.     PLAN:   1.  At this time, Ms. Small will be admitted to [facility] for further evaluation and treatment. Her admitting diagnoses are as noted.  Her condition at this time is judged to be stable. She will be placed on telemetry.     2.  I have asked the nursing staff to obtain vital signs per protocol. She will be confined to bedrest with bathroom privileges with assistance. Her allergies to sulfa and latex are noted. I have asked the nursing staff to monitor input and output. Daily weights will obtained. Neurologic checks to be obtained every 4 hours. She will be maintained on a regular diet. IV fluids will consist of normal saline at 50 mL/h. Oxygen will be used as needed to maintain her O2 saturation greater than 92%. She is a FULL CODE. Fall precautions are to be instituted. Likewise, seizure precautions are to be instituted.     3.  I will consult the neurology service.     4.  I will obtain followup laboratory studies in the morning.     5.  I will make an effort to obtain her medical records from [place]. Note, the patient is able to recall the name of her physician in [place]. She relates that she was hospitalized at [facility] in [place], [place].     ADMITTING MEDICATIONS:   1.  Lovenox 40 mg subcutaneous daily.   2.  Metoprolol 12.5 mg p.o. b.i.d.   3.  Tamiflu 75 mg p.o. b.i.d.   4.  Protonix 40 mg p.o. daily.   5.  Tylenol 650 mg p.o. q.6 h. p.r.n. for fever and/or discomfort.   6.  Norco 7.5 mg 1 p.o. q.4 h. p.r.n. for pain.   7.  DuoNeb 1 unit q.4 h. p.r.n. for shortness of breath and/or wheezing.   8.  Zofran 4 mg IV q.6 h. p.r.n. for nausea and vomiting.     I will continue to follow Ms. Small closely through the night pending return of the hospitalist team in the morning. The nursing staff may call should they have any questions or concerns. Please refer to the medical record for additional information, orders and/or comments.       cc:       CARMEN Bonner/[id number]  D:  03/19/2017 03:20:12(Eastern Time)  T:  03/19/2017 07:53:08(Eastern Time)  Voice ID:  [id number]/Document ID:  [id number]       Electronically signed by  Alan Gutierrez MD at 3/19/2017  7:11 PM        Discharge Summary     No notes of this type exist for this encounter.

## 2017-03-21 ENCOUNTER — DOCUMENTATION (OUTPATIENT)
Dept: NEUROLOGY | Facility: CLINIC | Age: 52
End: 2017-03-21

## 2017-03-28 ENCOUNTER — APPOINTMENT (OUTPATIENT)
Dept: MRI IMAGING | Facility: HOSPITAL | Age: 52
End: 2017-03-28
Attending: PSYCHIATRY & NEUROLOGY

## 2017-04-03 ENCOUNTER — HOSPITAL ENCOUNTER (OUTPATIENT)
Dept: MRI IMAGING | Facility: HOSPITAL | Age: 52
Discharge: HOME | End: 2017-04-03
Attending: PSYCHIATRY & NEUROLOGY

## 2017-04-03 ENCOUNTER — TELEPHONE (OUTPATIENT)
Dept: CARDIOLOGY | Facility: CLINIC | Age: 52
End: 2017-04-03

## 2017-04-03 DIAGNOSIS — R55 SYNCOPE AND COLLAPSE: ICD-10-CM

## 2017-04-03 LAB — CREAT BLDA-MCNC: 0.9 MG/DL (ref 0.6–1.3)

## 2017-04-03 PROCEDURE — 82565 ASSAY OF CREATININE: CPT

## 2017-04-03 NOTE — TELEPHONE ENCOUNTER
She called and wanting us to fax her hospital records to Dr. Krueger's office.  This has been done to attn of No @ fax [phone].  She has an appt with Dr. Krueger on 5/4 @ 11:15

## 2017-04-17 ENCOUNTER — TRANSCRIBE ORDERS (OUTPATIENT)
Dept: ADMINISTRATIVE | Facility: HOSPITAL | Age: 52
End: 2017-04-17

## 2017-04-17 DIAGNOSIS — N63.10 BREAST MASS, RIGHT: ICD-10-CM

## 2017-04-17 DIAGNOSIS — R92.8 ABNORMAL MAMMOGRAM: Primary | ICD-10-CM

## 2017-04-17 DIAGNOSIS — Z12.31 ENCOUNTER FOR SCREENING MAMMOGRAM FOR MALIGNANT NEOPLASM OF BREAST: Primary | ICD-10-CM

## 2017-04-20 ENCOUNTER — HOSPITAL ENCOUNTER (OUTPATIENT)
Dept: MAMMOGRAPHY | Facility: HOSPITAL | Age: 52
Discharge: HOME | End: 2017-04-20
Admitting: FAMILY MEDICINE

## 2017-04-20 ENCOUNTER — HOSPITAL ENCOUNTER (OUTPATIENT)
Dept: ULTRASOUND IMAGING | Facility: HOSPITAL | Age: 52
Discharge: HOME | End: 2017-04-20

## 2017-04-20 DIAGNOSIS — R92.8 ABNORMAL MAMMOGRAM: ICD-10-CM

## 2017-04-20 DIAGNOSIS — N63.10 BREAST MASS, RIGHT: ICD-10-CM

## 2017-04-20 DIAGNOSIS — Z12.31 ENCOUNTER FOR SCREENING MAMMOGRAM FOR MALIGNANT NEOPLASM OF BREAST: ICD-10-CM

## 2017-04-20 PROCEDURE — G0279 TOMOSYNTHESIS, MAMMO: HCPCS

## 2017-04-20 PROCEDURE — G0204 DX MAMMO INCL CAD BI: HCPCS

## 2017-04-20 PROCEDURE — 76642 ULTRASOUND BREAST LIMITED: CPT

## 2017-04-21 ENCOUNTER — TELEPHONE (OUTPATIENT)
Dept: NEUROLOGY | Facility: CLINIC | Age: 52
End: 2017-04-21

## 2017-04-21 NOTE — TELEPHONE ENCOUNTER
Mrs. Small called to let the office know that she is claustrophobic and not able to complete her MRI. She did request something to help her. I did explain that since she has never been seen in our office we are not able to write for any medications. She did voice understanding.

## 2017-04-28 ENCOUNTER — OFFICE VISIT (OUTPATIENT)
Dept: CARDIOLOGY | Facility: CLINIC | Age: 52
End: 2017-04-28

## 2017-04-28 VITALS
HEART RATE: 108 BPM | DIASTOLIC BLOOD PRESSURE: 96 MMHG | HEIGHT: 64 IN | BODY MASS INDEX: 43.02 KG/M2 | WEIGHT: 252 LBS | SYSTOLIC BLOOD PRESSURE: 130 MMHG | OXYGEN SATURATION: 99 %

## 2017-04-28 DIAGNOSIS — I47.1 PSVT (PAROXYSMAL SUPRAVENTRICULAR TACHYCARDIA) (HCC): Primary | ICD-10-CM

## 2017-04-28 DIAGNOSIS — R55 SYNCOPE AND COLLAPSE: ICD-10-CM

## 2017-04-28 DIAGNOSIS — E66.01 MORBID OBESITY DUE TO EXCESS CALORIES (HCC): ICD-10-CM

## 2017-04-28 PROBLEM — R51.9 FREQUENT HEADACHES: Status: ACTIVE | Noted: 2017-04-28

## 2017-04-28 PROBLEM — G40.909 SEIZURE DISORDER (HCC): Status: ACTIVE | Noted: 2017-04-28

## 2017-04-28 PROCEDURE — 99213 OFFICE O/P EST LOW 20 MIN: CPT | Performed by: INTERNAL MEDICINE

## 2017-04-28 PROCEDURE — 93000 ELECTROCARDIOGRAM COMPLETE: CPT | Performed by: INTERNAL MEDICINE

## 2017-04-28 RX ORDER — OMEGA-3 FATTY ACIDS/FISH OIL 300-1000MG
200 CAPSULE ORAL DAILY PRN
COMMUNITY
End: 2018-10-01

## 2017-04-28 NOTE — PROGRESS NOTES
Reason for Visit: cardiovascular follow up.    HPI:  Ana Small is a 51 y.o. female is here today for hospital follow-up.  She was admitted from March 18 until March 20 with syncope.  She was found to have paroxysmal SVT with heart rate up to 197 bpm concerning for a reentry tachycardia.  She was given adenosine and it resolved.  She also has a history of seizure disorder.  She has not had any further episodes since discharge.  She denies any significant palpitations and has not had another syncopal episode.  She exercises daily by walking.      Previous Cardiac Testing and Procedures:  - Carotid duplex (03/20/2017) less than 50% bilaterally  - Echo (03/20/2017) EF greater than 55%, normal RV size and function, no significant valvular abnormality    Patient Active Problem List   Diagnosis   • PSVT (paroxysmal supraventricular tachycardia)   • Syncope and collapse   • Morbid obesity due to excess calories   • Seizure disorder   • Frequent headaches       Social History   Substance Use Topics   • Smoking status: Current Every Day Smoker     Packs/day: 0.50     Years: 20.00     Types: Cigarettes   • Smokeless tobacco: Never Used   • Alcohol use No       Family History   Problem Relation Age of Onset   • Breast cancer Maternal Aunt    • Cancer Mother      LIVER   • Diabetes Mother    • Heart disease Mother        The following portions of the patient's history were reviewed and updated as appropriate: allergies, current medications, past family history, past medical history, past social history, past surgical history and problem list.      Current Outpatient Prescriptions:   •  Ibuprofen (ADVIL) 200 MG capsule, Take  by mouth., Disp: , Rfl:   •  metoprolol tartrate (LOPRESSOR) 25 MG tablet, Take 1 tablet by mouth Every 12 (Twelve) Hours., Disp: 60 tablet, Rfl: 1  •  HYDROcodone-acetaminophen (NORCO) 7.5-325 MG per tablet, Take 1 tablet by mouth Every 4 (Four) Hours As Needed for moderate pain (4-6)., Disp: 12  "tablet, Rfl: 0    Review of Systems   Constitution: Negative for chills and fever.   Cardiovascular: Negative for chest pain and paroxysmal nocturnal dyspnea.   Respiratory: Negative for cough and shortness of breath.    Skin: Negative for rash.   Gastrointestinal: Negative for abdominal pain and heartburn.   Neurological: Negative for dizziness and numbness.       Objective   /96 (BP Location: Left arm, Patient Position: Sitting, Cuff Size: Adult)  Pulse 108  Ht 64\" (162.6 cm)  Wt 252 lb (114 kg)  SpO2 99%  BMI 43.26 kg/m2  Physical Exam   Constitutional: She is oriented to person, place, and time. She appears well-developed and well-nourished.   HENT:   Head: Normocephalic and atraumatic.   Cardiovascular: Normal rate, regular rhythm and normal heart sounds.    No murmur heard.  Pulmonary/Chest: Effort normal and breath sounds normal.   Musculoskeletal: She exhibits no edema.   Neurological: She is alert and oriented to person, place, and time.   Skin: Skin is warm and dry.   Psychiatric: She has a normal mood and affect.       ECG 12 Lead  Date/Time: 4/28/2017 11:01 AM  Performed by: DAVID SCHREIBER  Authorized by: DAVID SCHREIBER   Comparison: compared with previous ECG from 3/18/2017  Similar to previous ECG  Rhythm: sinus rhythm  Clinical impression: low voltage              ICD-10-CM ICD-9-CM   1. PSVT (paroxysmal supraventricular tachycardia) I47.1 427.0   2. Syncope and collapse R55 780.2   3. Morbid obesity due to excess calories E66.01 278.01         Assessment/Plan:  1.  Excisional SVT: Rhythm appeared to be an AV and RT on EKG.  Resolved with adenosine. Currently managed on metoprolol 25 mg twice a day.  Appears to be controlling symptoms.  Has an appointment with EP next week.     2.  Syncope: Very likely secondary to SVT.  Also has a history of seizure disorder which remains in the differential.    3.  Morbid obesity: BMI 43, [place] on diet, exercise, weight loss.  "

## 2017-05-01 ENCOUNTER — TRANSCRIBE ORDERS (OUTPATIENT)
Dept: ADMINISTRATIVE | Facility: HOSPITAL | Age: 52
End: 2017-05-01

## 2017-05-01 DIAGNOSIS — N64.9 BREAST LESION: Primary | ICD-10-CM

## 2017-05-04 ENCOUNTER — APPOINTMENT (OUTPATIENT)
Dept: MRI IMAGING | Facility: HOSPITAL | Age: 52
End: 2017-05-04
Attending: SPECIALIST

## 2017-05-21 ENCOUNTER — APPOINTMENT (OUTPATIENT)
Dept: GENERAL RADIOLOGY | Facility: HOSPITAL | Age: 52
End: 2017-05-21

## 2017-05-21 ENCOUNTER — HOSPITAL ENCOUNTER (EMERGENCY)
Facility: HOSPITAL | Age: 52
Discharge: HOME | End: 2017-05-21
Attending: FAMILY MEDICINE | Admitting: FAMILY MEDICINE

## 2017-05-21 VITALS
HEART RATE: 90 BPM | RESPIRATION RATE: 20 BRPM | BODY MASS INDEX: 42.68 KG/M2 | OXYGEN SATURATION: 98 % | TEMPERATURE: 98.2 F | SYSTOLIC BLOOD PRESSURE: 128 MMHG | WEIGHT: 250 LBS | DIASTOLIC BLOOD PRESSURE: 90 MMHG | HEIGHT: 64 IN

## 2017-05-21 DIAGNOSIS — R07.9 CHEST PAIN, UNSPECIFIED TYPE: Primary | ICD-10-CM

## 2017-05-21 LAB
ALBUMIN SERPL-MCNC: 3.9 G/DL (ref 3.5–5)
ALBUMIN/GLOB SERPL: 1.3 G/DL (ref 1.1–2.5)
ALP SERPL-CCNC: 116 U/L (ref 24–120)
ALT SERPL W P-5'-P-CCNC: 45 U/L (ref 0–54)
ANION GAP SERPL CALCULATED.3IONS-SCNC: 11 MMOL/L (ref 4–13)
AST SERPL-CCNC: 31 U/L (ref 7–45)
BASOPHILS # BLD AUTO: 0.03 10*3/MM3 (ref 0–0.2)
BASOPHILS NFR BLD AUTO: 0.4 % (ref 0–2)
BILIRUB SERPL-MCNC: 0.5 MG/DL (ref 0.1–1)
BUN BLD-MCNC: 14 MG/DL (ref 5–21)
BUN/CREAT SERPL: 16.9 (ref 7–25)
CALCIUM SPEC-SCNC: 10.3 MG/DL (ref 8.4–10.4)
CHLORIDE SERPL-SCNC: 102 MMOL/L (ref 98–110)
CK MB SERPL-CCNC: 0.34 NG/ML (ref 0–5)
CO2 SERPL-SCNC: 24 MMOL/L (ref 24–31)
CREAT BLD-MCNC: 0.83 MG/DL (ref 0.5–1.4)
DEPRECATED RDW RBC AUTO: 44.3 FL (ref 40–54)
EOSINOPHIL # BLD AUTO: 0.37 10*3/MM3 (ref 0–0.7)
EOSINOPHIL NFR BLD AUTO: 4.6 % (ref 0–4)
ERYTHROCYTE [DISTWIDTH] IN BLOOD BY AUTOMATED COUNT: 13.6 % (ref 12–15)
GFR SERPL CREATININE-BSD FRML MDRD: 72 ML/MIN/1.73
GLOBULIN UR ELPH-MCNC: 3.1 GM/DL
GLUCOSE BLD-MCNC: 101 MG/DL (ref 70–100)
HCT VFR BLD AUTO: 43.3 % (ref 37–47)
HGB BLD-MCNC: 14.9 G/DL (ref 12–16)
IMM GRANULOCYTES # BLD: 0.02 10*3/MM3 (ref 0–0.03)
IMM GRANULOCYTES NFR BLD: 0.2 % (ref 0–5)
LYMPHOCYTES # BLD AUTO: 1.55 10*3/MM3 (ref 0.72–4.86)
LYMPHOCYTES NFR BLD AUTO: 19.1 % (ref 15–45)
MCH RBC QN AUTO: 30.6 PG (ref 28–32)
MCHC RBC AUTO-ENTMCNC: 34.4 G/DL (ref 33–36)
MCV RBC AUTO: 88.9 FL (ref 82–98)
MONOCYTES # BLD AUTO: 0.84 10*3/MM3 (ref 0.19–1.3)
MONOCYTES NFR BLD AUTO: 10.4 % (ref 4–12)
MYOGLOBIN SERPL-MCNC: 17.6 NG/ML (ref 0–110)
NEUTROPHILS # BLD AUTO: 5.3 10*3/MM3 (ref 1.87–8.4)
NEUTROPHILS NFR BLD AUTO: 65.3 % (ref 39–78)
PLATELET # BLD AUTO: 254 10*3/MM3 (ref 130–400)
PMV BLD AUTO: 9.9 FL (ref 6–12)
POTASSIUM BLD-SCNC: 4.2 MMOL/L (ref 3.5–5.3)
PROT SERPL-MCNC: 7 G/DL (ref 6.3–8.7)
RBC # BLD AUTO: 4.87 10*6/MM3 (ref 4.2–5.4)
SODIUM BLD-SCNC: 137 MMOL/L (ref 135–145)
TROPONIN I SERPL-MCNC: 0 NG/ML (ref 0–0.07)
WBC NRBC COR # BLD: 8.11 10*3/MM3 (ref 4.8–10.8)

## 2017-05-21 PROCEDURE — 82553 CREATINE MB FRACTION: CPT | Performed by: FAMILY MEDICINE

## 2017-05-21 PROCEDURE — 96375 TX/PRO/DX INJ NEW DRUG ADDON: CPT

## 2017-05-21 PROCEDURE — 93010 ELECTROCARDIOGRAM REPORT: CPT | Performed by: INTERNAL MEDICINE

## 2017-05-21 PROCEDURE — 93005 ELECTROCARDIOGRAM TRACING: CPT | Performed by: FAMILY MEDICINE

## 2017-05-21 PROCEDURE — 84484 ASSAY OF TROPONIN QUANT: CPT

## 2017-05-21 PROCEDURE — 25010000002 ONDANSETRON PER 1 MG: Performed by: FAMILY MEDICINE

## 2017-05-21 PROCEDURE — 25010000002 HYDROMORPHONE PER 4 MG: Performed by: FAMILY MEDICINE

## 2017-05-21 PROCEDURE — 25010000002 KETOROLAC TROMETHAMINE PER 15 MG: Performed by: FAMILY MEDICINE

## 2017-05-21 PROCEDURE — 71010 HC CHEST PA OR AP: CPT

## 2017-05-21 PROCEDURE — 83874 ASSAY OF MYOGLOBIN: CPT | Performed by: FAMILY MEDICINE

## 2017-05-21 PROCEDURE — 80053 COMPREHEN METABOLIC PANEL: CPT | Performed by: FAMILY MEDICINE

## 2017-05-21 PROCEDURE — 99283 EMERGENCY DEPT VISIT LOW MDM: CPT

## 2017-05-21 PROCEDURE — 85025 COMPLETE CBC W/AUTO DIFF WBC: CPT | Performed by: FAMILY MEDICINE

## 2017-05-21 PROCEDURE — 96374 THER/PROPH/DIAG INJ IV PUSH: CPT

## 2017-05-21 RX ORDER — PANTOPRAZOLE SODIUM 40 MG/1
40 TABLET, DELAYED RELEASE ORAL DAILY
Qty: 15 TABLET | Refills: 0 | Status: SHIPPED | OUTPATIENT
Start: 2017-05-21 | End: 2017-05-22 | Stop reason: ALTCHOICE

## 2017-05-21 RX ORDER — ONDANSETRON 2 MG/ML
4 INJECTION INTRAMUSCULAR; INTRAVENOUS ONCE
Status: COMPLETED | OUTPATIENT
Start: 2017-05-21 | End: 2017-05-21

## 2017-05-21 RX ORDER — ONDANSETRON 4 MG/1
4 TABLET, ORALLY DISINTEGRATING ORAL ONCE
Status: COMPLETED | OUTPATIENT
Start: 2017-05-21 | End: 2017-05-21

## 2017-05-21 RX ORDER — KETOROLAC TROMETHAMINE 30 MG/ML
30 INJECTION, SOLUTION INTRAMUSCULAR; INTRAVENOUS ONCE
Status: COMPLETED | OUTPATIENT
Start: 2017-05-21 | End: 2017-05-21

## 2017-05-21 RX ORDER — SODIUM CHLORIDE 9 MG/ML
125 INJECTION, SOLUTION INTRAVENOUS CONTINUOUS
Status: DISCONTINUED | OUTPATIENT
Start: 2017-05-21 | End: 2017-05-21 | Stop reason: HOSPADM

## 2017-05-21 RX ORDER — ONDANSETRON 4 MG/1
4 TABLET, ORALLY DISINTEGRATING ORAL 4 TIMES DAILY PRN
Qty: 20 TABLET | Refills: 0 | Status: SHIPPED | OUTPATIENT
Start: 2017-05-21 | End: 2018-10-01

## 2017-05-21 RX ORDER — MAGNESIUM HYDROXIDE/ALUMINUM HYDROXICE/SIMETHICONE 120; 1200; 1200 MG/30ML; MG/30ML; MG/30ML
30 SUSPENSION ORAL ONCE
Status: COMPLETED | OUTPATIENT
Start: 2017-05-21 | End: 2017-05-21

## 2017-05-21 RX ORDER — HYDROCODONE BITARTRATE AND ACETAMINOPHEN 7.5; 325 MG/1; MG/1
1 TABLET ORAL EVERY 4 HOURS PRN
Qty: 12 TABLET | Refills: 0 | Status: SHIPPED | OUTPATIENT
Start: 2017-05-21 | End: 2018-10-01

## 2017-05-21 RX ORDER — HYDROCODONE BITARTRATE AND ACETAMINOPHEN 7.5; 325 MG/1; MG/1
1 TABLET ORAL ONCE
Status: DISCONTINUED | OUTPATIENT
Start: 2017-05-21 | End: 2017-05-21 | Stop reason: HOSPADM

## 2017-05-21 RX ORDER — RANITIDINE 150 MG/1
150 TABLET ORAL 2 TIMES DAILY
Qty: 30 TABLET | Refills: 0 | Status: SHIPPED | OUTPATIENT
Start: 2017-05-21 | End: 2017-05-22 | Stop reason: ALTCHOICE

## 2017-05-21 RX ADMIN — ONDANSETRON 4 MG: 2 INJECTION INTRAMUSCULAR; INTRAVENOUS at 19:45

## 2017-05-21 RX ADMIN — ONDANSETRON 4 MG: 4 TABLET, ORALLY DISINTEGRATING ORAL at 18:18

## 2017-05-21 RX ADMIN — LIDOCAINE HYDROCHLORIDE 15 ML: 20 SOLUTION ORAL; TOPICAL at 19:45

## 2017-05-21 RX ADMIN — HYDROMORPHONE HYDROCHLORIDE 1 MG: 1 INJECTION, SOLUTION INTRAMUSCULAR; INTRAVENOUS; SUBCUTANEOUS at 19:45

## 2017-05-21 RX ADMIN — ALUMINUM HYDROXIDE, MAGNESIUM HYDROXIDE, AND SIMETHICONE 30 ML: 200; 200; 20 SUSPENSION ORAL at 19:45

## 2017-05-21 RX ADMIN — KETOROLAC TROMETHAMINE 30 MG: 30 INJECTION, SOLUTION INTRAMUSCULAR at 18:17

## 2017-05-22 ENCOUNTER — APPOINTMENT (OUTPATIENT)
Dept: PREADMISSION TESTING | Facility: HOSPITAL | Age: 52
End: 2017-05-22

## 2017-05-22 VITALS
BODY MASS INDEX: 43.9 KG/M2 | HEIGHT: 63 IN | WEIGHT: 247.75 LBS | HEART RATE: 71 BPM | OXYGEN SATURATION: 96 % | SYSTOLIC BLOOD PRESSURE: 123 MMHG | DIASTOLIC BLOOD PRESSURE: 76 MMHG

## 2017-05-25 ENCOUNTER — HOSPITAL ENCOUNTER (OUTPATIENT)
Facility: HOSPITAL | Age: 52
Setting detail: HOSPITAL OUTPATIENT SURGERY
Discharge: HOME | End: 2017-05-25
Attending: SPECIALIST | Admitting: SPECIALIST

## 2017-05-25 ENCOUNTER — ANESTHESIA EVENT (OUTPATIENT)
Dept: PERIOP | Facility: HOSPITAL | Age: 52
End: 2017-05-25

## 2017-05-25 ENCOUNTER — ANESTHESIA (OUTPATIENT)
Dept: PERIOP | Facility: HOSPITAL | Age: 52
End: 2017-05-25

## 2017-05-25 VITALS
HEART RATE: 73 BPM | RESPIRATION RATE: 16 BRPM | TEMPERATURE: 97.8 F | OXYGEN SATURATION: 95 % | DIASTOLIC BLOOD PRESSURE: 76 MMHG | SYSTOLIC BLOOD PRESSURE: 117 MMHG

## 2017-05-25 DIAGNOSIS — IMO0002 MASS: ICD-10-CM

## 2017-05-25 PROCEDURE — 25010000002 ONDANSETRON PER 1 MG: Performed by: NURSE ANESTHETIST, CERTIFIED REGISTERED

## 2017-05-25 PROCEDURE — 25010000002 KETOROLAC TROMETHAMINE PER 15 MG: Performed by: NURSE ANESTHETIST, CERTIFIED REGISTERED

## 2017-05-25 PROCEDURE — 88305 TISSUE EXAM BY PATHOLOGIST: CPT | Performed by: SPECIALIST

## 2017-05-25 PROCEDURE — 25010000002 FENTANYL CITRATE (PF) 100 MCG/2ML SOLUTION: Performed by: NURSE ANESTHETIST, CERTIFIED REGISTERED

## 2017-05-25 PROCEDURE — 25010000002 DEXAMETHASONE PER 1 MG: Performed by: NURSE ANESTHETIST, CERTIFIED REGISTERED

## 2017-05-25 PROCEDURE — 25010000002 HYDROMORPHONE PER 4 MG: Performed by: NURSE ANESTHETIST, CERTIFIED REGISTERED

## 2017-05-25 PROCEDURE — 25010000002 MIDAZOLAM PER 1 MG: Performed by: NURSE ANESTHETIST, CERTIFIED REGISTERED

## 2017-05-25 PROCEDURE — 25010000002 SUCCINYLCHOLINE PER 20 MG: Performed by: NURSE ANESTHETIST, CERTIFIED REGISTERED

## 2017-05-25 PROCEDURE — 25010000002 PROPOFOL 10 MG/ML EMULSION: Performed by: NURSE ANESTHETIST, CERTIFIED REGISTERED

## 2017-05-25 PROCEDURE — 25010000002 METOCLOPRAMIDE PER 10 MG: Performed by: NURSE ANESTHETIST, CERTIFIED REGISTERED

## 2017-05-25 PROCEDURE — 25010000003 CEFAZOLIN PER 500 MG: Performed by: NURSE ANESTHETIST, CERTIFIED REGISTERED

## 2017-05-25 RX ORDER — SODIUM CHLORIDE 0.9 % (FLUSH) 0.9 %
1-10 SYRINGE (ML) INJECTION AS NEEDED
Status: DISCONTINUED | OUTPATIENT
Start: 2017-05-25 | End: 2017-05-25 | Stop reason: HOSPADM

## 2017-05-25 RX ORDER — PROPOFOL 10 MG/ML
VIAL (ML) INTRAVENOUS AS NEEDED
Status: DISCONTINUED | OUTPATIENT
Start: 2017-05-25 | End: 2017-05-25 | Stop reason: SURG

## 2017-05-25 RX ORDER — MAGNESIUM HYDROXIDE 1200 MG/15ML
LIQUID ORAL AS NEEDED
Status: DISCONTINUED | OUTPATIENT
Start: 2017-05-25 | End: 2017-05-25 | Stop reason: HOSPADM

## 2017-05-25 RX ORDER — MIDAZOLAM HYDROCHLORIDE 1 MG/ML
1 INJECTION INTRAMUSCULAR; INTRAVENOUS
Status: DISCONTINUED | OUTPATIENT
Start: 2017-05-25 | End: 2017-05-25 | Stop reason: HOSPADM

## 2017-05-25 RX ORDER — LABETALOL HYDROCHLORIDE 5 MG/ML
5 INJECTION, SOLUTION INTRAVENOUS
Status: DISCONTINUED | OUTPATIENT
Start: 2017-05-25 | End: 2017-05-25 | Stop reason: HOSPADM

## 2017-05-25 RX ORDER — MORPHINE SULFATE 2 MG/ML
2 INJECTION, SOLUTION INTRAMUSCULAR; INTRAVENOUS AS NEEDED
Status: DISCONTINUED | OUTPATIENT
Start: 2017-05-25 | End: 2017-05-25 | Stop reason: HOSPADM

## 2017-05-25 RX ORDER — SCOLOPAMINE TRANSDERMAL SYSTEM 1 MG/1
1 PATCH, EXTENDED RELEASE TRANSDERMAL ONCE
Status: DISCONTINUED | OUTPATIENT
Start: 2017-05-25 | End: 2017-05-25 | Stop reason: HOSPADM

## 2017-05-25 RX ORDER — MIDAZOLAM HYDROCHLORIDE 1 MG/ML
2 INJECTION INTRAMUSCULAR; INTRAVENOUS
Status: DISCONTINUED | OUTPATIENT
Start: 2017-05-25 | End: 2017-05-25 | Stop reason: HOSPADM

## 2017-05-25 RX ORDER — ONDANSETRON 2 MG/ML
INJECTION INTRAMUSCULAR; INTRAVENOUS AS NEEDED
Status: DISCONTINUED | OUTPATIENT
Start: 2017-05-25 | End: 2017-05-25 | Stop reason: SURG

## 2017-05-25 RX ORDER — OMEPRAZOLE 20 MG/1
20 CAPSULE, DELAYED RELEASE ORAL DAILY
COMMUNITY
End: 2019-08-26

## 2017-05-25 RX ORDER — DEXAMETHASONE SODIUM PHOSPHATE 4 MG/ML
INJECTION, SOLUTION INTRA-ARTICULAR; INTRALESIONAL; INTRAMUSCULAR; INTRAVENOUS; SOFT TISSUE AS NEEDED
Status: DISCONTINUED | OUTPATIENT
Start: 2017-05-25 | End: 2017-05-25 | Stop reason: SURG

## 2017-05-25 RX ORDER — LIDOCAINE HYDROCHLORIDE 20 MG/ML
INJECTION, SOLUTION INFILTRATION; PERINEURAL AS NEEDED
Status: DISCONTINUED | OUTPATIENT
Start: 2017-05-25 | End: 2017-05-25 | Stop reason: SURG

## 2017-05-25 RX ORDER — SUCCINYLCHOLINE CHLORIDE 20 MG/ML
INJECTION INTRAMUSCULAR; INTRAVENOUS AS NEEDED
Status: DISCONTINUED | OUTPATIENT
Start: 2017-05-25 | End: 2017-05-25 | Stop reason: SURG

## 2017-05-25 RX ORDER — IBUPROFEN 600 MG/1
600 TABLET ORAL EVERY 6 HOURS PRN
Status: DISCONTINUED | OUTPATIENT
Start: 2017-05-25 | End: 2017-05-25 | Stop reason: HOSPADM

## 2017-05-25 RX ORDER — ROCURONIUM BROMIDE 10 MG/ML
INJECTION, SOLUTION INTRAVENOUS AS NEEDED
Status: DISCONTINUED | OUTPATIENT
Start: 2017-05-25 | End: 2017-05-25 | Stop reason: SURG

## 2017-05-25 RX ORDER — MEPERIDINE HYDROCHLORIDE 25 MG/ML
12.5 INJECTION INTRAMUSCULAR; INTRAVENOUS; SUBCUTANEOUS
Status: DISCONTINUED | OUTPATIENT
Start: 2017-05-25 | End: 2017-05-25 | Stop reason: HOSPADM

## 2017-05-25 RX ORDER — CEFAZOLIN SODIUM 1 G/3ML
INJECTION, POWDER, FOR SOLUTION INTRAMUSCULAR; INTRAVENOUS AS NEEDED
Status: DISCONTINUED | OUTPATIENT
Start: 2017-05-25 | End: 2017-05-25 | Stop reason: SURG

## 2017-05-25 RX ORDER — NALOXONE HCL 0.4 MG/ML
0.04 VIAL (ML) INJECTION AS NEEDED
Status: DISCONTINUED | OUTPATIENT
Start: 2017-05-25 | End: 2017-05-25 | Stop reason: HOSPADM

## 2017-05-25 RX ORDER — IPRATROPIUM BROMIDE AND ALBUTEROL SULFATE 2.5; .5 MG/3ML; MG/3ML
3 SOLUTION RESPIRATORY (INHALATION) ONCE AS NEEDED
Status: DISCONTINUED | OUTPATIENT
Start: 2017-05-25 | End: 2017-05-25 | Stop reason: HOSPADM

## 2017-05-25 RX ORDER — ONDANSETRON 2 MG/ML
4 INJECTION INTRAMUSCULAR; INTRAVENOUS AS NEEDED
Status: DISCONTINUED | OUTPATIENT
Start: 2017-05-25 | End: 2017-05-25 | Stop reason: HOSPADM

## 2017-05-25 RX ORDER — HYDRALAZINE HYDROCHLORIDE 20 MG/ML
5 INJECTION INTRAMUSCULAR; INTRAVENOUS
Status: DISCONTINUED | OUTPATIENT
Start: 2017-05-25 | End: 2017-05-25 | Stop reason: HOSPADM

## 2017-05-25 RX ORDER — SODIUM CHLORIDE, SODIUM LACTATE, POTASSIUM CHLORIDE, CALCIUM CHLORIDE 600; 310; 30; 20 MG/100ML; MG/100ML; MG/100ML; MG/100ML
100 INJECTION, SOLUTION INTRAVENOUS CONTINUOUS
Status: DISCONTINUED | OUTPATIENT
Start: 2017-05-25 | End: 2017-05-25 | Stop reason: HOSPADM

## 2017-05-25 RX ORDER — METOCLOPRAMIDE HYDROCHLORIDE 5 MG/ML
5 INJECTION INTRAMUSCULAR; INTRAVENOUS
Status: DISCONTINUED | OUTPATIENT
Start: 2017-05-25 | End: 2017-05-25 | Stop reason: HOSPADM

## 2017-05-25 RX ORDER — METOCLOPRAMIDE HYDROCHLORIDE 5 MG/ML
INJECTION INTRAMUSCULAR; INTRAVENOUS AS NEEDED
Status: DISCONTINUED | OUTPATIENT
Start: 2017-05-25 | End: 2017-05-25 | Stop reason: SURG

## 2017-05-25 RX ORDER — FLUMAZENIL 0.1 MG/ML
0.2 INJECTION INTRAVENOUS AS NEEDED
Status: DISCONTINUED | OUTPATIENT
Start: 2017-05-25 | End: 2017-05-25 | Stop reason: HOSPADM

## 2017-05-25 RX ORDER — BUPIVACAINE HYDROCHLORIDE AND EPINEPHRINE 2.5; 5 MG/ML; UG/ML
INJECTION, SOLUTION INFILTRATION; PERINEURAL AS NEEDED
Status: DISCONTINUED | OUTPATIENT
Start: 2017-05-25 | End: 2017-05-25 | Stop reason: HOSPADM

## 2017-05-25 RX ORDER — KETOROLAC TROMETHAMINE 30 MG/ML
INJECTION, SOLUTION INTRAMUSCULAR; INTRAVENOUS AS NEEDED
Status: DISCONTINUED | OUTPATIENT
Start: 2017-05-25 | End: 2017-05-25 | Stop reason: SURG

## 2017-05-25 RX ORDER — FENTANYL CITRATE 50 UG/ML
INJECTION, SOLUTION INTRAMUSCULAR; INTRAVENOUS AS NEEDED
Status: DISCONTINUED | OUTPATIENT
Start: 2017-05-25 | End: 2017-05-25 | Stop reason: SURG

## 2017-05-25 RX ADMIN — FENTANYL CITRATE 50 MCG: 50 INJECTION, SOLUTION INTRAMUSCULAR; INTRAVENOUS at 08:23

## 2017-05-25 RX ADMIN — LIDOCAINE HYDROCHLORIDE 0.5 ML: 10 INJECTION, SOLUTION EPIDURAL; INFILTRATION; INTRACAUDAL; PERINEURAL at 08:14

## 2017-05-25 RX ADMIN — DEXAMETHASONE SODIUM PHOSPHATE 4 MG: 4 INJECTION, SOLUTION INTRAMUSCULAR; INTRAVENOUS at 08:28

## 2017-05-25 RX ADMIN — HYDROMORPHONE HYDROCHLORIDE 0.5 MG: 1 INJECTION, SOLUTION INTRAMUSCULAR; INTRAVENOUS; SUBCUTANEOUS at 09:12

## 2017-05-25 RX ADMIN — KETOROLAC TROMETHAMINE 30 MG: 30 INJECTION, SOLUTION INTRAMUSCULAR at 08:45

## 2017-05-25 RX ADMIN — SCOPALAMINE 1 PATCH: 1 PATCH, EXTENDED RELEASE TRANSDERMAL at 08:14

## 2017-05-25 RX ADMIN — ROCURONIUM BROMIDE 10 MG: 10 INJECTION INTRAVENOUS at 08:24

## 2017-05-25 RX ADMIN — LIDOCAINE HYDROCHLORIDE 100 MG: 20 INJECTION, SOLUTION INFILTRATION; PERINEURAL at 08:24

## 2017-05-25 RX ADMIN — MIDAZOLAM HYDROCHLORIDE 2 MG: 1 INJECTION, SOLUTION INTRAMUSCULAR; INTRAVENOUS at 08:14

## 2017-05-25 RX ADMIN — HYDROMORPHONE HYDROCHLORIDE 0.5 MG: 1 INJECTION, SOLUTION INTRAMUSCULAR; INTRAVENOUS; SUBCUTANEOUS at 09:38

## 2017-05-25 RX ADMIN — METOCLOPRAMIDE 10 MG: 5 INJECTION, SOLUTION INTRAMUSCULAR; INTRAVENOUS at 08:35

## 2017-05-25 RX ADMIN — FENTANYL CITRATE 50 MCG: 50 INJECTION, SOLUTION INTRAMUSCULAR; INTRAVENOUS at 08:35

## 2017-05-25 RX ADMIN — PROPOFOL 150 MG: 10 INJECTION, EMULSION INTRAVENOUS at 08:24

## 2017-05-25 RX ADMIN — SODIUM CHLORIDE, POTASSIUM CHLORIDE, SODIUM LACTATE AND CALCIUM CHLORIDE 100 ML/HR: 600; 310; 30; 20 INJECTION, SOLUTION INTRAVENOUS at 08:15

## 2017-05-25 RX ADMIN — HYDROMORPHONE HYDROCHLORIDE 0.5 MG: 1 INJECTION, SOLUTION INTRAMUSCULAR; INTRAVENOUS; SUBCUTANEOUS at 09:34

## 2017-05-25 RX ADMIN — CEFAZOLIN 1 G: 1 INJECTION, POWDER, FOR SOLUTION INTRAVENOUS at 08:28

## 2017-05-25 RX ADMIN — SUCCINYLCHOLINE CHLORIDE 120 MG: 20 INJECTION, SOLUTION INTRAMUSCULAR; INTRAVENOUS at 08:24

## 2017-05-25 RX ADMIN — HYDROMORPHONE HYDROCHLORIDE 0.5 MG: 1 INJECTION, SOLUTION INTRAMUSCULAR; INTRAVENOUS; SUBCUTANEOUS at 09:05

## 2017-05-25 RX ADMIN — SODIUM CHLORIDE, POTASSIUM CHLORIDE, SODIUM LACTATE AND CALCIUM CHLORIDE 100 ML/HR: 600; 310; 30; 20 INJECTION, SOLUTION INTRAVENOUS at 09:33

## 2017-05-25 RX ADMIN — ONDANSETRON HYDROCHLORIDE 4 MG: 2 SOLUTION INTRAMUSCULAR; INTRAVENOUS at 08:28

## 2017-05-25 RX ADMIN — ONDANSETRON 4 MG: 2 INJECTION INTRAMUSCULAR; INTRAVENOUS at 09:04

## 2017-05-26 LAB
CYTO UR: NORMAL
LAB AP CASE REPORT: NORMAL
LAB AP CLINICAL INFORMATION: NORMAL
Lab: NORMAL
PATH REPORT.FINAL DX SPEC: NORMAL
PATH REPORT.GROSS SPEC: NORMAL

## 2017-09-19 ENCOUNTER — TRANSCRIBE ORDERS (OUTPATIENT)
Dept: ADMINISTRATIVE | Facility: HOSPITAL | Age: 52
End: 2017-09-19

## 2017-09-19 ENCOUNTER — HOSPITAL ENCOUNTER (OUTPATIENT)
Dept: ULTRASOUND IMAGING | Facility: HOSPITAL | Age: 52
Discharge: HOME | End: 2017-09-19
Admitting: NURSE PRACTITIONER

## 2017-09-19 DIAGNOSIS — N63.0 BREAST LUMP OR MASS: ICD-10-CM

## 2017-09-19 DIAGNOSIS — N63.0 BREAST LUMP OR MASS: Primary | ICD-10-CM

## 2017-09-19 PROCEDURE — 76642 ULTRASOUND BREAST LIMITED: CPT

## 2017-09-22 PROCEDURE — 88305 TISSUE EXAM BY PATHOLOGIST: CPT | Performed by: SURGERY

## 2017-09-25 ENCOUNTER — LAB REQUISITION (OUTPATIENT)
Dept: LAB | Facility: HOSPITAL | Age: 52
End: 2017-09-25

## 2017-09-25 DIAGNOSIS — Z00.00 ENCOUNTER FOR GENERAL ADULT MEDICAL EXAMINATION WITHOUT ABNORMAL FINDINGS: ICD-10-CM

## 2018-07-13 ENCOUNTER — TRANSCRIBE ORDERS (OUTPATIENT)
Dept: ADMINISTRATIVE | Facility: HOSPITAL | Age: 53
End: 2018-07-13

## 2018-07-13 DIAGNOSIS — E83.52 HYPERCALCEMIA: Primary | ICD-10-CM

## 2018-07-13 DIAGNOSIS — D35.1 PARATHYROID ADENOMA: ICD-10-CM

## 2018-07-18 ENCOUNTER — HOSPITAL ENCOUNTER (OUTPATIENT)
Dept: NUCLEAR MEDICINE | Facility: HOSPITAL | Age: 53
Discharge: HOME | End: 2018-07-18

## 2018-07-18 ENCOUNTER — HOSPITAL ENCOUNTER (OUTPATIENT)
Dept: ULTRASOUND IMAGING | Facility: HOSPITAL | Age: 53
Discharge: HOME | End: 2018-07-18
Admitting: GENERAL PRACTICE

## 2018-07-18 DIAGNOSIS — D35.1 PARATHYROID ADENOMA: ICD-10-CM

## 2018-07-18 DIAGNOSIS — E83.52 HYPERCALCEMIA: ICD-10-CM

## 2018-07-18 PROCEDURE — 76536 US EXAM OF HEAD AND NECK: CPT

## 2018-07-18 PROCEDURE — 78071 PARATHYRD PLANAR W/WO SUBTRJ: CPT

## 2018-07-18 PROCEDURE — A9500 TC99M SESTAMIBI: HCPCS | Performed by: GENERAL PRACTICE

## 2018-07-18 PROCEDURE — 0 TECHNETIUM SESTAMIBI: Performed by: GENERAL PRACTICE

## 2018-07-18 RX ADMIN — TECHNETIUM TC 99M SESTAMIBI 1 DOSE: 1 INJECTION INTRAVENOUS at 09:30

## 2018-08-22 PROCEDURE — 88307 TISSUE EXAM BY PATHOLOGIST: CPT | Performed by: SURGERY

## 2018-08-24 ENCOUNTER — LAB REQUISITION (OUTPATIENT)
Dept: LAB | Facility: HOSPITAL | Age: 53
End: 2018-08-24

## 2018-08-24 DIAGNOSIS — Z00.00 ENCOUNTER FOR GENERAL ADULT MEDICAL EXAMINATION WITHOUT ABNORMAL FINDINGS: ICD-10-CM

## 2018-09-26 ENCOUNTER — HOSPITAL ENCOUNTER (OUTPATIENT)
Dept: RADIATION ONCOLOGY | Facility: HOSPITAL | Age: 53
Setting detail: RADIATION/ONCOLOGY SERIES
End: 2018-09-26

## 2018-09-27 PROBLEM — C73 THYROID CANCER (HCC): Status: ACTIVE | Noted: 2018-09-27

## 2018-09-28 PROBLEM — Z71.9 ENCOUNTER FOR CONSULTATION: Status: ACTIVE | Noted: 2018-09-28

## 2018-09-28 PROBLEM — Z98.890 S/P TOTAL THYROIDECTOMY: Status: ACTIVE | Noted: 2018-09-28

## 2018-09-28 PROBLEM — F17.200 CURRENT SMOKER ON SOME DAYS: Status: ACTIVE | Noted: 2018-09-28

## 2018-09-28 PROBLEM — E89.0 S/P TOTAL THYROIDECTOMY: Status: ACTIVE | Noted: 2018-09-28

## 2018-09-28 PROBLEM — Z90.89 S/P TOTAL THYROIDECTOMY: Status: ACTIVE | Noted: 2018-09-28

## 2018-10-01 ENCOUNTER — CONSULT (OUTPATIENT)
Dept: RADIATION ONCOLOGY | Facility: HOSPITAL | Age: 53
End: 2018-10-01

## 2018-10-01 ENCOUNTER — HOSPITAL ENCOUNTER (OUTPATIENT)
Dept: RADIATION ONCOLOGY | Facility: HOSPITAL | Age: 53
Setting detail: RADIATION/ONCOLOGY SERIES
End: 2018-10-01

## 2018-10-01 ENCOUNTER — DOCUMENTATION (OUTPATIENT)
Dept: ONCOLOGY | Facility: HOSPITAL | Age: 53
End: 2018-10-01

## 2018-10-01 VITALS
HEIGHT: 63 IN | WEIGHT: 203 LBS | BODY MASS INDEX: 35.97 KG/M2 | DIASTOLIC BLOOD PRESSURE: 76 MMHG | SYSTOLIC BLOOD PRESSURE: 120 MMHG

## 2018-10-01 DIAGNOSIS — E89.0 S/P TOTAL THYROIDECTOMY: ICD-10-CM

## 2018-10-01 DIAGNOSIS — F17.200 CURRENT SMOKER ON SOME DAYS: ICD-10-CM

## 2018-10-01 DIAGNOSIS — C73 THYROID CANCER (HCC): Primary | ICD-10-CM

## 2018-10-01 DIAGNOSIS — Z71.9 ENCOUNTER FOR CONSULTATION: ICD-10-CM

## 2018-10-01 PROCEDURE — G0463 HOSPITAL OUTPT CLINIC VISIT: HCPCS | Performed by: RADIOLOGY

## 2018-10-01 RX ORDER — FLUOXETINE HYDROCHLORIDE 20 MG/1
20 CAPSULE ORAL DAILY
Status: ON HOLD | COMMUNITY
End: 2023-01-31

## 2018-10-01 RX ORDER — BENZONATATE 100 MG/1
100 CAPSULE ORAL 3 TIMES DAILY PRN
COMMUNITY
End: 2019-08-26

## 2018-10-01 NOTE — PROGRESS NOTES
Social work consultation due to distress score of 7.  Patient is here for consult for thyroid cancer.   She reports that her main trigger for stress is pain.  She has pain in her legs, bones and back, and an infected breast.  She is prescribed Tramadol for this but does not report a great deal of relief.  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  She reports that her  is employed and that finances are not a concern at this time.  She reports an increase in nervousness since surgery and diagnosis in Aug.  She takes Prozac for hot flashes but overall feels like she is coping with things pretty well.  No needs noted at this time.

## 2018-10-17 LAB
CYTO UR: NORMAL
LAB AP CASE REPORT: NORMAL
LAB AP CLINICAL INFORMATION: NORMAL
PATH REPORT.FINAL DX SPEC: NORMAL
PATH REPORT.GROSS SPEC: NORMAL

## 2019-08-26 ENCOUNTER — OFFICE VISIT (OUTPATIENT)
Dept: OBSTETRICS AND GYNECOLOGY | Facility: CLINIC | Age: 54
End: 2019-08-26

## 2019-08-26 ENCOUNTER — PROCEDURE VISIT (OUTPATIENT)
Dept: OBSTETRICS AND GYNECOLOGY | Facility: CLINIC | Age: 54
End: 2019-08-26

## 2019-08-26 VITALS
DIASTOLIC BLOOD PRESSURE: 100 MMHG | WEIGHT: 247 LBS | BODY MASS INDEX: 43.77 KG/M2 | SYSTOLIC BLOOD PRESSURE: 140 MMHG | HEIGHT: 63 IN

## 2019-08-26 DIAGNOSIS — N83.201 RIGHT OVARIAN CYST: ICD-10-CM

## 2019-08-26 DIAGNOSIS — R19.00 PELVIC MASS: ICD-10-CM

## 2019-08-26 DIAGNOSIS — R10.2 PELVIC PAIN IN FEMALE: Primary | ICD-10-CM

## 2019-08-26 DIAGNOSIS — R10.2 PELVIC PAIN: Primary | ICD-10-CM

## 2019-08-26 PROBLEM — K21.9 ACID REFLUX: Status: ACTIVE | Noted: 2018-05-22

## 2019-08-26 PROBLEM — Z85.850 HISTORY OF PAPILLARY ADENOCARCINOMA OF THYROID: Status: ACTIVE | Noted: 2019-04-10

## 2019-08-26 PROBLEM — E83.52 HYPERCALCEMIA: Status: ACTIVE | Noted: 2018-07-27

## 2019-08-26 PROCEDURE — 76830 TRANSVAGINAL US NON-OB: CPT | Performed by: OBSTETRICS & GYNECOLOGY

## 2019-08-26 PROCEDURE — 99203 OFFICE O/P NEW LOW 30 MIN: CPT | Performed by: OBSTETRICS & GYNECOLOGY

## 2019-08-26 RX ORDER — LEVOTHYROXINE SODIUM 0.12 MG/1
125 TABLET ORAL DAILY
Refills: 0 | COMMUNITY
Start: 2019-07-29 | End: 2022-03-07

## 2019-08-26 RX ORDER — ALBUTEROL SULFATE 90 UG/1
2 AEROSOL, METERED RESPIRATORY (INHALATION) 2 TIMES DAILY
COMMUNITY

## 2019-08-26 RX ORDER — TRIAMCINOLONE ACETONIDE 1 MG/G
CREAM TOPICAL
Status: ON HOLD | COMMUNITY
End: 2023-01-31

## 2019-08-26 NOTE — PROGRESS NOTES
"Subjective   Ana Small is a 54 y.o. female.     CC: pelvic pain    Pelvic Pain   The patient's primary symptoms include pelvic pain. This is a chronic problem. The current episode started more than 1 year ago. The problem occurs constantly. The problem has been gradually worsening. The pain is moderate. The problem affects both sides. Associated symptoms include abdominal pain, back pain, diarrhea, frequency and nausea. The symptoms are aggravated by activity. She has tried NSAIDs for the symptoms. The treatment provided no relief.     She describes generalized lower abdominal pain, slowly worsening over past 2 weeks, constant pressure with occasional sharp pain. She has not seen her PCP. Pain is worse before and during a bowel movement, better shortly after. She reports a normal colonoscopy about 3 years ago.    Social History     Tobacco Use   • Smoking status: Former Smoker     Packs/day: 0.50     Years: 20.00     Pack years: 10.00     Types: Cigarettes     Last attempt to quit: 2017     Years since quittin.6   • Smokeless tobacco: Never Used   Substance Use Topics   • Alcohol use: No   • Drug use: No     Family History   Problem Relation Age of Onset   • Breast cancer Maternal Aunt    • Cancer Mother         LIVER   • Diabetes Mother    • Heart disease Mother       Review of Systems   Gastrointestinal: Positive for abdominal pain, diarrhea and nausea.   Genitourinary: Positive for frequency and pelvic pain.   Musculoskeletal: Positive for back pain.     Visit Vitals  /100 (BP Location: Left arm, Patient Position: Sitting, Cuff Size: Large Adult)   Ht 160 cm (63\")   Wt 112 kg (247 lb)   BMI 43.75 kg/m²      Objective   Physical Exam   Constitutional: She appears well-developed. No distress.   HENT:   Head: Normocephalic and atraumatic.   Cardiovascular: Normal rate.   Pulmonary/Chest: Effort normal.   Abdominal: Soft. Bowel sounds are normal. She exhibits no distension and no mass. There is no " tenderness. There is no rigidity, no rebound and no guarding.   Neurological: She is alert.   Skin: Skin is warm and dry.   Vitals reviewed.    A pelvic US ordered and performed in the office today shows a small 3.6cm uterine fibroid and a 4.3cm complex right ovarian cyst.    Assessment/Plan   Ana was seen today for pelvic pain.    Diagnoses and all orders for this visit:    Pelvic pain in female  -     Urine Culture - Urine, Urine, Clean Catch    Right ovarian cyst    Pelvic mass  -     Ovarian Malignancy Risk-ALBERTO    Other orders  -     Premenopausal Interp: HIGH  -     Postmenopausal Interp: LOW      Her symptoms sound most consistent with irritable bowel syndrome.  We will contact her when the lab results are available and she will return in 6 weeks for follow-up ultrasound to check for resolution of the cyst.

## 2019-08-27 LAB
CANCER AG125 SERPL-ACNC: 17.1 U/ML (ref 0–38.1)
HE4 SERPL-SCNC: 84.7 PMOL/L (ref 0–105.2)
LABORATORY COMMENT REPORT: ABNORMAL
POSTMENOPAUSAL INTERP: LOW: NORMAL
PREMENOPAUSAL INTERP: HIGH: NORMAL
ROMA SCORE POSTMEN SERPL: 1.99
ROMA SCORE PREMEN SERPL: 2.21

## 2019-08-28 LAB
BACTERIA UR CULT: NO GROWTH
BACTERIA UR CULT: NORMAL

## 2019-08-29 ENCOUNTER — TELEPHONE (OUTPATIENT)
Dept: OBSTETRICS AND GYNECOLOGY | Facility: CLINIC | Age: 54
End: 2019-08-29

## 2019-08-29 NOTE — TELEPHONE ENCOUNTER
Pt called and was informed of neg UA and ovarian tumor marker results.  She verbalizes understanding

## 2019-12-27 NOTE — PROGRESS NOTES
MEDICAL ONCOLOGY CONSULTATION    Pt Name: Mati Weber  MRN: [id number]  YOB: 1965  Date of evaluation: 1/2/2020    REASON FOR CONSULTATION:  Thyroid cancer  REQUESTING PHYSICIAN: Dr Angelica Billingsley    History Obtained From:  patient and old medical records    HISTORY OF PRESENT ILLNESS:      Diagnosis  · Papillary thyroid carcinoma, August 2018  · T0N1M0  Treatment summary  · 8/22/2018-total thyroidectomy and limited radha dissection  · 12/5/2018-radioactive iodine ablation. Ms Mati Weber was first seen by me on 1/2/2020 to establish conservative care of her history of thyroid cancer. The patient was diagnosed in August 2018. She underwent a total thyroidectomy and limited radha dissection on 8/22/2018. · 8/22/2018- total thyroidectomy and limited radha dissection \"node picking\". Pathology revealed a multinodular thyroid gland with focal calcification but with no evidence of any primary malignancy. 2 left parathyroid glands were resected with 1 being a parathyroid adenoma. A single lymph node was resected and found to contain a papillary thyroid carcinoma metastasis. Final pathologic staging T0N1M0  · 12/5/2018- she was seen by Dr. Gucci Schreiber and was recommended adjuvant radioactive iodine treatment. She received treatment 12/5/2018 at the [facility]. · 12/6/2018-she was last seen by Chanda Pruitt. She was recommended follow-up thyroid scan whole body to assess response. Unfortunately, the patient could not drive to [facility] and therefore requested to be followed locally. · 01/02/2020-she was first seen by me. Recommended follow-up with endocrinology. Recommended thyroid whole-body scan.           Past Medical History:    Past Medical History:   Diagnosis Date    Cancer [facility])     Thyroid Cancer    Cerebral artery occlusion with cerebral infarction ([facility] [address]     Hypertension    Surgical hypothyroidism    Past Surgical History:    Past Surgical History:   Procedure Laterality Date    HERNIA REPAIR Trisha LIVER RESECTION      PARATHYROIDECTOMY         Social History:    Social History     Socioeconomic History    Marital status:      Spouse name: Not on file    Number of children: Not on file    Years of education: Not on file    Highest education level: Not on file   Occupational History    Not on file   Social Needs    Financial resource strain: Not on file    Food insecurity:     Worry: Not on file     Inability: Not on file    Transportation needs:     Medical: Not on file     Non-medical: Not on file   Tobacco Use    Smoking status: Former Smoker     Last attempt to quit: 7/15/2015     Years since quittin.4    Smokeless tobacco: Never Used   Substance and Sexual Activity    Alcohol use: No    Drug use: No    Sexual activity: Not on file   Lifestyle    Physical activity:     Days per week: Not on file     Minutes per session: Not on file    Stress: Not on file   Relationships    Social connections:     Talks on phone: Not on file     Gets together: Not on file     Attends [employer] service: Not on file     Active member of club or organization: Not on file     Attends meetings of clubs or organizations: Not on file     Relationship status: Not on file    Intimate partner violence:     Fear of current or ex partner: Not on file     Emotionally abused: Not on file     Physically abused: Not on file     Forced sexual activity: Not on file   Other Topics Concern    Not on file   Social History Narrative    Not on file       Family History:   Family History   Problem Relation Age of Onset    Cancer Mother         Colon and Liver Cancer    Cancer Father         Prostate Cancer    Heart Disease Brother     High Blood Pressure Brother        Current Hospital Medications:    No current facility-administered medications for this visit. Allergies: Allergies   Allergen Reactions    Latex Anaphylaxis     CALLED TO NOLAN IN SURGERY SCHEDULING.     Sulfa Antibiotics Anaphylaxis Subjective   REVIEW OF SYSTEMS:   CONSTITUTIONAL: no fever, no night sweats, fatigue;  HEENT: no blurring of vision, no double vision, no hearing difficulty, no tinnitus, no ulceration, no dysplasia, no epistaxis;  LUNGS: no cough, no hemoptysis, no wheeze,  no shortness of breath;  CARDIOVASCULAR: no palpitation, no chest pain, no shortness of breath;  GI: no abdominal pain, no nausea, no vomiting, no diarrhea, no constipation;  AJAY: no dysuria, no hematuria, no frequency or urgency, no nephrolithiasis;  MUSCULOSKELETAL: no joint pain, no swelling, no stiffness;  ENDOCRINE: no polyuria, no polydipsia, no cold or heat intolerance;  HEMATOLOGY: no easy bruising or bleeding, no history of clotting disorder;  DERMATOLOGY: no skin rash, no eczema, no pruritus;  PSYCHIATRY: no depression, no anxiety, no panic attacks, no suicidal ideation, no homicidal ideation;  NEUROLOGY: no syncope, no seizures, no numbness or tingling of hands, no numbness or tingling of feet, no paresis;    Objective   /86   Pulse 76   Ht 5' 4\" (1.626 m)   Wt 261 lb (118.4 kg)   SpO2 98%   BMI 44.80 kg/m²     PHYSICAL EXAM:  CONSTITUTIONAL: Alert, appropriate, no acute distress  EYES: Non icteric, EOM intact, pupils equal round   ENT: Mucus membranes moist, no oral pharyngeal lesions, external inspection of ears and nose are normal  NECK: Supple, no masses. Neck scar well-healed. CHEST/LUNGS: CTA bilaterally, normal respiratory effort   CARDIOVASCULAR: RRR, no murmurs. No lower extremity edema  ABDOMEN: soft non-tender, active bowel sounds, no HSM. No palpable masses  EXTREMITIES: warm, full ROM in all 4 extremities, no focal weakness. SKIN: warm, dry with no rashes or lesions  LYMPH: No cervical, clavicular, axillary, or inguinal lymphadenopathy  NEUROLOGIC: follows commands, non focal   PSYCH: mood and affect appropriate.   Alert and oriented to time, place, person        LABORATORY RESULTS REVIEWED BY ME:  QTS:35/74/6167  WBC-6.68  HGB-14.3  PLT-308,000  Neut-4.55    December 2019-TSH 46.    1/2/2020-TSH pending today. RADIOLOGY STUDIES REVIEWED BY ME:    ASSESSMENT:  # Thyroid Cancer-T0N1M0  The patient was counseled today about diagnosis, staging, prognosis, diagnostic tests, medications, side effects and disease management. The method of counseling included verbal explanation. The patient verbalized understanding. Recommended repeat thyroid whole-body scan   Discussed with Erum Douglas today for coordination of care. Repeat TSH to assure good suppression. Recommend to follow-up with endocrinology with Dr. Destiney Ochoa    Surgical hypothyroidism- not adequately suppressed. Last TSH 4 weeks ago 46. Will repeat TSH today. Patient request increase levothyroxine dose to 150 mcg daily. She was adequately suppressed back in February 2019 on 150 mcg daily. Referred to endocrinology to establish follow-up. PLAN:  Referred to endocrinology  Repeat TSH today  Increase levothyroxine from 125 mcg to 150 mcg daily  PTH levels  Thyroglobulin levels  CMP  RTC 4 weeks    I, Dr Tata Golden, personally performed the services described in this documentation as scribed by Luis Angel Hong MA in my presence and is both accurate and complete. I, Dr Tata Golden, personally performed the services described in this documentation as scribed by Luis Angel Hong MA in my presence and is both accurate and complete. Over 50% of the total visit time of 60 minutes in face to face encounter with the patient, out of which more than 50% of the time was spent in counseling patient or family and coordination of care. Counseling included but was not limited to time spent reviewing labs, imaging studies/ treatment plan and answering questions.      Tamar Hedrick MD    01/02/20  4:35 PM

## 2019-12-31 PROBLEM — G40.909 SEIZURE DISORDER (HCC): Status: ACTIVE | Noted: 2017-04-28

## 2019-12-31 PROBLEM — F17.200 CURRENT SMOKER ON SOME DAYS: Status: ACTIVE | Noted: 2018-09-28

## 2019-12-31 PROBLEM — K21.9 ACID REFLUX: Status: ACTIVE | Noted: 2018-05-22

## 2019-12-31 PROBLEM — E83.52 HYPERCALCEMIA: Status: ACTIVE | Noted: 2018-07-27

## 2019-12-31 PROBLEM — R51.9 FREQUENT HEADACHES: Status: ACTIVE | Noted: 2017-04-28

## 2019-12-31 PROBLEM — Z90.89 S/P TOTAL THYROIDECTOMY: Status: ACTIVE | Noted: 2018-09-28

## 2019-12-31 PROBLEM — E66.01 MORBID OBESITY DUE TO EXCESS CALORIES (HCC): Status: ACTIVE | Noted: 2017-04-28

## 2019-12-31 PROBLEM — I47.1 PSVT (PAROXYSMAL SUPRAVENTRICULAR TACHYCARDIA) (HCC): Status: ACTIVE | Noted: 2017-03-18

## 2019-12-31 PROBLEM — I47.10 PSVT (PAROXYSMAL SUPRAVENTRICULAR TACHYCARDIA) (HCC): Status: ACTIVE | Noted: 2017-03-18

## 2019-12-31 PROBLEM — C73 THYROID CANCER (HCC): Status: ACTIVE | Noted: 2018-09-27

## 2019-12-31 PROBLEM — E89.0 S/P TOTAL THYROIDECTOMY: Status: ACTIVE | Noted: 2018-09-28

## 2019-12-31 PROBLEM — R55 SYNCOPE AND COLLAPSE: Status: ACTIVE | Noted: 2017-03-19

## 2019-12-31 PROBLEM — Z71.9 ENCOUNTER FOR CONSULTATION: Status: ACTIVE | Noted: 2018-09-28

## 2019-12-31 PROBLEM — Z98.890 S/P TOTAL THYROIDECTOMY: Status: ACTIVE | Noted: 2018-09-28

## 2019-12-31 PROBLEM — Z85.850 HISTORY OF PAPILLARY ADENOCARCINOMA OF THYROID: Status: ACTIVE | Noted: 2019-04-10

## 2020-01-02 ENCOUNTER — HOSPITAL ENCOUNTER (OUTPATIENT)
Dept: INFUSION THERAPY | Age: 55
Discharge: HOME | End: 2020-01-02
Payer: COMMERCIAL

## 2020-01-02 ENCOUNTER — OFFICE VISIT (OUTPATIENT)
Dept: HEMATOLOGY | Age: 55
End: 2020-01-02
Payer: COMMERCIAL

## 2020-01-02 VITALS
HEART RATE: 76 BPM | OXYGEN SATURATION: 98 % | HEIGHT: 64 IN | BODY MASS INDEX: 44.56 KG/M2 | WEIGHT: 261 LBS | DIASTOLIC BLOOD PRESSURE: 86 MMHG | SYSTOLIC BLOOD PRESSURE: 138 MMHG

## 2020-01-02 DIAGNOSIS — C73 THYROID CANCER (HCC): ICD-10-CM

## 2020-01-02 PROCEDURE — 99205 OFFICE O/P NEW HI 60 MIN: CPT | Performed by: INTERNAL MEDICINE

## 2020-01-02 PROCEDURE — 99201 HC NEW PT, E/M LEVEL 1: CPT

## 2020-01-02 PROCEDURE — 85025 COMPLETE CBC W/AUTO DIFF WBC: CPT

## 2020-01-02 RX ORDER — ALBUTEROL SULFATE 90 UG/1
AEROSOL, METERED RESPIRATORY (INHALATION)
COMMUNITY

## 2020-01-02 RX ORDER — ACETAMINOPHEN AND CODEINE PHOSPHATE 300; 30 MG/1; MG/1
TABLET ORAL
COMMUNITY
Start: 2019-11-30

## 2020-01-02 RX ORDER — METOCLOPRAMIDE 5 MG/1
TABLET ORAL
COMMUNITY
Start: 2019-12-27

## 2020-01-02 RX ORDER — LEVOTHYROXINE SODIUM 0.15 MG/1
150 TABLET ORAL DAILY
Qty: 30 TABLET | Refills: 1 | Status: SHIPPED | OUTPATIENT
Start: 2020-01-02 | End: 2020-01-28

## 2020-01-02 RX ORDER — OXYCODONE AND ACETAMINOPHEN 10; 325 MG/1; MG/1
TABLET ORAL
COMMUNITY
Start: 2019-10-08

## 2020-01-02 RX ORDER — SUCRALFATE 1 G/1
TABLET ORAL
COMMUNITY

## 2020-01-02 RX ORDER — METRONIDAZOLE 250 MG/1
TABLET ORAL
COMMUNITY
Start: 2019-10-29 | End: 2020-01-02

## 2020-01-02 RX ORDER — MONTELUKAST SODIUM 4 MG/1
TABLET, CHEWABLE ORAL
COMMUNITY
Start: 2019-09-27

## 2020-01-02 RX ORDER — LEVOTHYROXINE SODIUM 0.12 MG/1
TABLET ORAL
COMMUNITY
Start: 2019-07-29 | End: 2020-01-28

## 2020-01-02 RX ORDER — DOCUSATE SODIUM 100 MG/1
CAPSULE, LIQUID FILLED ORAL
COMMUNITY
Start: 2019-10-28

## 2020-01-02 RX ORDER — BENZONATATE 200 MG/1
CAPSULE ORAL
COMMUNITY
Start: 2019-09-27

## 2020-01-02 RX ORDER — AZITHROMYCIN 500 MG/1
TABLET, FILM COATED ORAL
COMMUNITY
Start: 2019-09-27

## 2020-01-02 RX ORDER — TRIAMCINOLONE ACETONIDE 1 MG/G
CREAM TOPICAL
COMMUNITY

## 2020-01-02 RX ORDER — OMEPRAZOLE 20 MG/1
CAPSULE, DELAYED RELEASE ORAL
COMMUNITY
Start: 2019-11-05

## 2020-01-02 RX ORDER — FLUOXETINE HYDROCHLORIDE 20 MG/1
CAPSULE ORAL
COMMUNITY
Start: 2019-12-13 | End: 2020-01-02

## 2020-01-22 ENCOUNTER — APPOINTMENT (OUTPATIENT)
Dept: NUCLEAR MEDICINE | Age: 55
End: 2020-01-22
Payer: COMMERCIAL

## 2020-01-24 ENCOUNTER — APPOINTMENT (OUTPATIENT)
Dept: NUCLEAR MEDICINE | Age: 55
End: 2020-01-24
Payer: COMMERCIAL

## 2020-01-28 RX ORDER — LEVOTHYROXINE SODIUM 0.15 MG/1
TABLET ORAL
Qty: 30 TABLET | Refills: 1 | Status: SHIPPED | OUTPATIENT
Start: 2020-01-28 | End: 2020-01-28 | Stop reason: SDUPTHER

## 2020-01-28 RX ORDER — LEVOTHYROXINE SODIUM 0.15 MG/1
150 TABLET ORAL DAILY
Qty: 90 TABLET | Refills: 3 | Status: SHIPPED | OUTPATIENT
Start: 2020-01-28

## 2020-01-30 RX ORDER — LEVOTHYROXINE SODIUM 0.15 MG/1
150 TABLET ORAL DAILY
Qty: 90 TABLET | Refills: 3 | Status: CANCELLED | OUTPATIENT
Start: 2020-01-30

## 2020-02-10 ENCOUNTER — HOSPITAL ENCOUNTER (OUTPATIENT)
Dept: NUCLEAR MEDICINE | Age: 55
Discharge: HOME | End: 2020-02-12
Payer: COMMERCIAL

## 2020-02-10 ENCOUNTER — HOSPITAL ENCOUNTER (OUTPATIENT)
Dept: NUCLEAR MEDICINE | Age: 55
End: 2020-02-10
Payer: COMMERCIAL

## 2020-02-10 PROCEDURE — 78018 THYROID MET IMAGING BODY: CPT

## 2020-02-12 PROCEDURE — 3430000000 HC RX DIAGNOSTIC RADIOPHARMACEUTICAL: Performed by: INTERNAL MEDICINE

## 2020-02-12 PROCEDURE — A9517 I131 IODIDE CAP, RX: HCPCS | Performed by: INTERNAL MEDICINE

## 2020-02-12 RX ADMIN — SODIUM IODIDE I 131 3 MILLICURIE: 1 CAPSULE, GELATIN COATED ORAL at 15:14

## 2020-02-13 PROCEDURE — 3430000000 HC RX DIAGNOSTIC RADIOPHARMACEUTICAL: Performed by: INTERNAL MEDICINE

## 2020-02-13 PROCEDURE — A9517 I131 IODIDE CAP, RX: HCPCS | Performed by: INTERNAL MEDICINE

## 2020-02-13 RX ADMIN — SODIUM IODIDE I 131 3 MILLICURIE: 1 CAPSULE, GELATIN COATED ORAL at 15:21

## 2020-03-31 PROCEDURE — 88304 TISSUE EXAM BY PATHOLOGIST: CPT | Performed by: GENERAL PRACTICE

## 2020-04-01 ENCOUNTER — LAB REQUISITION (OUTPATIENT)
Dept: LAB | Facility: HOSPITAL | Age: 55
End: 2020-04-01

## 2020-04-01 DIAGNOSIS — Z00.00 ENCOUNTER FOR GENERAL ADULT MEDICAL EXAMINATION WITHOUT ABNORMAL FINDINGS: ICD-10-CM

## 2020-07-04 ENCOUNTER — APPOINTMENT (OUTPATIENT)
Dept: GENERAL RADIOLOGY | Age: 55
End: 2020-07-04
Payer: COMMERCIAL

## 2020-07-04 ENCOUNTER — APPOINTMENT (OUTPATIENT)
Dept: CT IMAGING | Age: 55
End: 2020-07-04
Payer: COMMERCIAL

## 2020-07-04 ENCOUNTER — HOSPITAL ENCOUNTER (EMERGENCY)
Age: 55
Discharge: HOME | End: 2020-07-04
Attending: PEDIATRICS
Payer: COMMERCIAL

## 2020-07-04 ENCOUNTER — APPOINTMENT (OUTPATIENT)
Dept: NUCLEAR MEDICINE | Age: 55
End: 2020-07-04
Payer: COMMERCIAL

## 2020-07-04 VITALS
BODY MASS INDEX: 43.32 KG/M2 | HEART RATE: 98 BPM | OXYGEN SATURATION: 94 % | WEIGHT: 260 LBS | SYSTOLIC BLOOD PRESSURE: 124 MMHG | TEMPERATURE: 98.2 F | HEIGHT: 65 IN | DIASTOLIC BLOOD PRESSURE: 86 MMHG | RESPIRATION RATE: 17 BRPM

## 2020-07-04 LAB
ALBUMIN SERPL-MCNC: 4.3 G/DL (ref 3.5–5.2)
ALP BLD-CCNC: 115 U/L (ref 35–104)
ALT SERPL-CCNC: 24 U/L (ref 5–33)
ANION GAP SERPL CALCULATED.3IONS-SCNC: 14 MMOL/L (ref 7–19)
APTT: 26.9 SEC (ref 26–36.2)
AST SERPL-CCNC: 19 U/L (ref 5–32)
BACTERIA: ABNORMAL /HPF
BASE EXCESS ARTERIAL: -0.9 MMOL/L (ref -2–2)
BASOPHILS ABSOLUTE: 0.1 K/UL (ref 0–0.2)
BASOPHILS RELATIVE PERCENT: 0.7 % (ref 0–1)
BILIRUB SERPL-MCNC: <0.2 MG/DL (ref 0.2–1.2)
BILIRUBIN URINE: NEGATIVE
BLOOD, URINE: NEGATIVE
BUN BLDV-MCNC: 21 MG/DL (ref 6–20)
CALCIUM SERPL-MCNC: 9 MG/DL (ref 8.6–10)
CARBOXYHEMOGLOBIN ARTERIAL: 0.7 % (ref 0–5)
CHLORIDE BLD-SCNC: 102 MMOL/L (ref 98–111)
CLARITY: ABNORMAL
CO2: 22 MMOL/L (ref 22–29)
COLOR: YELLOW
CREAT SERPL-MCNC: 1.3 MG/DL (ref 0.5–0.9)
D DIMER: 1.79 UG/ML FEU (ref 0–0.48)
EOSINOPHILS ABSOLUTE: 0.2 K/UL (ref 0–0.6)
EOSINOPHILS RELATIVE PERCENT: 3 % (ref 0–5)
EPITHELIAL CELLS, UA: ABNORMAL /HPF
GFR NON-AFRICAN AMERICAN: 42
GLUCOSE BLD-MCNC: 198 MG/DL (ref 74–109)
GLUCOSE URINE: NEGATIVE MG/DL
HCO3 ARTERIAL: 21.8 MMOL/L (ref 22–26)
HCT VFR BLD CALC: 40.9 % (ref 37–47)
HEMOGLOBIN, ART, EXTENDED: 13 G/DL (ref 12–16)
HEMOGLOBIN: 12.9 G/DL (ref 12–16)
IMMATURE GRANULOCYTES #: 0 K/UL
INR BLD: 1.08 (ref 0.88–1.18)
KETONES, URINE: NEGATIVE MG/DL
LEUKOCYTE ESTERASE, URINE: ABNORMAL
LYMPHOCYTES ABSOLUTE: 1.6 K/UL (ref 1.1–4.5)
LYMPHOCYTES RELATIVE PERCENT: 22 % (ref 20–40)
MCH RBC QN AUTO: 26.5 PG (ref 27–31)
MCHC RBC AUTO-ENTMCNC: 31.5 G/DL (ref 33–37)
MCV RBC AUTO: 84.2 FL (ref 81–99)
METHEMOGLOBIN ARTERIAL: 0.7 %
MONOCYTES ABSOLUTE: 0.5 K/UL (ref 0–0.9)
MONOCYTES RELATIVE PERCENT: 6.7 % (ref 0–10)
NEUTROPHILS ABSOLUTE: 5 K/UL (ref 1.5–7.5)
NEUTROPHILS RELATIVE PERCENT: 67.5 % (ref 50–65)
NITRITE, URINE: NEGATIVE
O2 CONTENT ARTERIAL: 17.5 ML/DL
O2 SAT, ARTERIAL: 95.6 %
O2 THERAPY: ABNORMAL
PCO2 ARTERIAL: 30 MMHG (ref 35–45)
PDW BLD-RTO: 16.1 % (ref 11.5–14.5)
PH ARTERIAL: 7.47 (ref 7.35–7.45)
PH UA: 6.5 (ref 5–8)
PLATELET # BLD: 329 K/UL (ref 130–400)
PMV BLD AUTO: 9.4 FL (ref 9.4–12.3)
PO2 ARTERIAL: 80 MMHG (ref 80–100)
POTASSIUM REFLEX MAGNESIUM: 3.6 MMOL/L (ref 3.5–5)
POTASSIUM, WHOLE BLOOD: 3.1
PRO-BNP: 21 PG/ML (ref 0–900)
PROTEIN UA: NEGATIVE MG/DL
PROTHROMBIN TIME: 13.9 SEC (ref 12–14.6)
RBC # BLD: 4.86 M/UL (ref 4.2–5.4)
RBC UA: ABNORMAL /HPF (ref 0–2)
SEDIMENTATION RATE, ERYTHROCYTE: 20 MM/HR (ref 0–25)
SODIUM BLD-SCNC: 138 MMOL/L (ref 136–145)
SPECIFIC GRAVITY UA: 1.02 (ref 1–1.03)
TOTAL PROTEIN: 7.1 G/DL (ref 6.6–8.7)
TROPONIN: <0.01 NG/ML (ref 0–0.03)
TROPONIN: <0.01 NG/ML (ref 0–0.03)
UROBILINOGEN, URINE: 0.2 E.U./DL
WBC # BLD: 7.4 K/UL (ref 4.8–10.8)
WBC UA: ABNORMAL /HPF (ref 0–5)

## 2020-07-04 PROCEDURE — 85379 FIBRIN DEGRADATION QUANT: CPT

## 2020-07-04 PROCEDURE — 82803 BLOOD GASES ANY COMBINATION: CPT

## 2020-07-04 PROCEDURE — 2580000003 HC RX 258: Performed by: PEDIATRICS

## 2020-07-04 PROCEDURE — 78582 LUNG VENTILAT&PERFUS IMAGING: CPT

## 2020-07-04 PROCEDURE — 87086 URINE CULTURE/COLONY COUNT: CPT

## 2020-07-04 PROCEDURE — 74176 CT ABD & PELVIS W/O CONTRAST: CPT

## 2020-07-04 PROCEDURE — 3430000000 HC RX DIAGNOSTIC RADIOPHARMACEUTICAL: Performed by: PEDIATRICS

## 2020-07-04 PROCEDURE — 84132 ASSAY OF SERUM POTASSIUM: CPT

## 2020-07-04 PROCEDURE — 71046 X-RAY EXAM CHEST 2 VIEWS: CPT

## 2020-07-04 PROCEDURE — 81001 URINALYSIS AUTO W/SCOPE: CPT

## 2020-07-04 PROCEDURE — 85025 COMPLETE CBC W/AUTO DIFF WBC: CPT

## 2020-07-04 PROCEDURE — 83880 ASSAY OF NATRIURETIC PEPTIDE: CPT

## 2020-07-04 PROCEDURE — 85652 RBC SED RATE AUTOMATED: CPT

## 2020-07-04 PROCEDURE — 36600 WITHDRAWAL OF ARTERIAL BLOOD: CPT

## 2020-07-04 PROCEDURE — 36415 COLL VENOUS BLD VENIPUNCTURE: CPT

## 2020-07-04 PROCEDURE — 96374 THER/PROPH/DIAG INJ IV PUSH: CPT

## 2020-07-04 PROCEDURE — 84484 ASSAY OF TROPONIN QUANT: CPT

## 2020-07-04 PROCEDURE — 6360000002 HC RX W HCPCS: Performed by: PEDIATRICS

## 2020-07-04 PROCEDURE — 85610 PROTHROMBIN TIME: CPT

## 2020-07-04 PROCEDURE — 99285 EMERGENCY DEPT VISIT HI MDM: CPT

## 2020-07-04 PROCEDURE — A9540 TC99M MAA: HCPCS | Performed by: PEDIATRICS

## 2020-07-04 PROCEDURE — 85730 THROMBOPLASTIN TIME PARTIAL: CPT

## 2020-07-04 PROCEDURE — 80053 COMPREHEN METABOLIC PANEL: CPT

## 2020-07-04 RX ORDER — METOPROLOL SUCCINATE 25 MG/1
12.5 TABLET, EXTENDED RELEASE ORAL DAILY
Qty: 30 TABLET | Refills: 0 | Status: SHIPPED | OUTPATIENT
Start: 2020-07-04

## 2020-07-04 RX ORDER — SODIUM CHLORIDE 9 MG/ML
1000 INJECTION, SOLUTION INTRAVENOUS CONTINUOUS
Status: DISCONTINUED | OUTPATIENT
Start: 2020-07-04 | End: 2020-07-04 | Stop reason: HOSPADM

## 2020-07-04 RX ORDER — CEPHALEXIN 500 MG/1
500 CAPSULE ORAL 2 TIMES DAILY
Qty: 14 CAPSULE | Refills: 0 | Status: SHIPPED | OUTPATIENT
Start: 2020-07-04 | End: 2020-07-11

## 2020-07-04 RX ADMIN — Medication 5 MILLICURIE: at 17:06

## 2020-07-04 RX ADMIN — CEFTRIAXONE 1 G: 1 INJECTION, POWDER, FOR SOLUTION INTRAMUSCULAR; INTRAVENOUS at 15:41

## 2020-07-04 RX ADMIN — SODIUM CHLORIDE 1000 ML: 9 INJECTION, SOLUTION INTRAVENOUS at 14:47

## 2020-07-04 ASSESSMENT — ENCOUNTER SYMPTOMS
ABDOMINAL PAIN: 0
COUGH: 0
VOMITING: 0
RHINORRHEA: 0
NAUSEA: 0
COLOR CHANGE: 0
BACK PAIN: 0
SHORTNESS OF BREATH: 1
EYE DISCHARGE: 0

## 2020-07-04 NOTE — ED PROVIDER NOTES
[facility] EMERGENCY DEPT  eMERGENCY dEPARTMENT eNCOUnter      Pt Name: Blanche Cowan  MRN: [id number]  [place] 1965  Date of evaluation: 7/4/2020  Provider: Charlotte Mulligan MD    CHIEF COMPLAINT       Chief Complaint   Patient presents with    Hypertension     presents with c/o htn         HISTORY OF PRESENT ILLNESS   (Location/Symptom, Timing/Onset,Context/Setting, Quality, Duration, Modifying Factors, Severity)  Note limiting factors. Blanche Cowan is a 62 y.o. female who presents to the emergency department with high blood pressure. Patient states that she has had \"blood pressure issues for the past 3 months. \"  Patient has been having fluctuations in her blood pressure with it elevating to a systolic above 578. Patient states that she awakens from sleep with palpitations and high blood pressure. Patient states she is also had difficulty breathing. Patient has not followed up with her primary provider Dr. Fransico Macdonald secondary to \"the COVID thing. \"  Patient denies chest pain, fever, cough, congestion, vomiting, diarrhea, or black or bloody stools. HPI    NursingNotes were reviewed. REVIEW OF SYSTEMS    (2-9 systems for level 4, 10 or more for level 5)     Review of Systems   Constitutional: Negative for chills and fever. HENT: Negative for congestion and rhinorrhea. Eyes: Negative for discharge. Respiratory: Positive for shortness of breath. Negative for cough. Cardiovascular: Positive for palpitations. Negative for chest pain and leg swelling. Gastrointestinal: Negative for abdominal pain, nausea and vomiting. Genitourinary: Negative for difficulty urinating and dysuria. Musculoskeletal: Negative for back pain and neck pain. Skin: Negative for color change and pallor. Neurological: Negative for syncope and light-headedness. Psychiatric/Behavioral: Negative for agitation and confusion. The patient is not nervous/anxious. All other systems reviewed and are negative.            PAST MEDICALHISTORY     Past Medical History:   Diagnosis Date    Cancer [facility])     Thyroid Cancer    Cerebral artery occlusion with cerebral infarction ([facility] [address]     Hypertension          SURGICAL HISTORY       Past Surgical History:   Procedure Laterality Date    HERNIA REPAIR      LIVER RESECTION      PARATHYROIDECTOMY           CURRENT MEDICATIONS     Previous Medications    ACETAMINOPHEN-CODEINE (TYLENOL #3) 300-30 MG PER TABLET        ALBUTEROL SULFATE  (90 BASE) MCG/ACT INHALER    albuterol sulfate HFA 90 mcg/actuation aerosol inhaler    ALBUTEROL-IPRATROPIUM (COMBIVENT RESPIMAT)  MCG/ACT AERS INHALER    Inhale 1 puff into the lungs every 6 hours    AZITHROMYCIN (ZITHROMAX) 500 MG TABLET        BENZONATATE (TESSALON) 200 MG CAPSULE        COLESTIPOL (COLESTID) 1 G TABLET        DOCUSATE SODIUM (COLACE) 100 MG CAPSULE    TAKE 1 CAPSULE BY MOUTH THREE TIMES A DAY    FLUOXETINE HCL PO    fluoxetine 20 mg capsule    LEVOTHYROXINE (SYNTHROID) 150 MCG TABLET    Take 1 tablet by mouth Daily    METOCLOPRAMIDE (REGLAN) 5 MG TABLET        OMEPRAZOLE (PRILOSEC) 20 MG DELAYED RELEASE CAPSULE        OXYCODONE-ACETAMINOPHEN (PERCOCET)  MG PER TABLET        SUCRALFATE (CARAFATE) 1 GM TABLET    sucralfate 1 gram tablet    TRIAMCINOLONE (KENALOG) 0.1 % CREAM    triamcinolone 0.1 % topical ointment and dimethicone 5 % topical cream   apply thin layer to the affected area 3 times per day       ALLERGIES     Latex and Sulfa antibiotics    FAMILY HISTORY       Family History   Problem Relation Age of Onset    Cancer Mother         Colon and Liver Cancer    Cancer Father         Prostate Cancer    Heart Disease Brother     High Blood Pressure Brother           SOCIAL HISTORY       Social History     Socioeconomic History    Marital status:      Spouse name: None    Number of children: None    Years of education: None    Highest education level: None   Occupational History    None   Social Needs    Financial resource strain: None    Food insecurity     Worry: None     Inability: None    Transportation needs     Medical: None     Non-medical: None   Tobacco Use    Smoking status: Former Smoker     Last attempt to quit: 7/15/2015     Years since quittin.9    Smokeless tobacco: Never Used   Substance and Sexual Activity    Alcohol use: No    Drug use: No    Sexual activity: None   Lifestyle    Physical activity     Days per week: None     Minutes per session: None    Stress: None   Relationships    Social connections     Talks on phone: None     Gets together: None     Attends Rastafarian service: None     Active member of club or organization: None     Attends meetings of clubs or organizations: None     Relationship status: None    Intimate partner violence     Fear of current or ex partner: None     Emotionally abused: None     Physically abused: None     Forced sexual activity: None   Other Topics Concern    None   Social History Narrative    None       SCREENINGS             PHYSICAL EXAM    (up to 7 for level 4, 8 or more for level 5)     ED Triage Vitals [20 1357]   BP Temp Temp src Pulse Resp SpO2 Height Weight   126/87 98.2 °F (36.8 °C) -- 112 20 96 % 5' 5\" (1.651 m) 260 lb (117.9 kg)       Physical Exam  Vitals signs and nursing note reviewed. Constitutional:       General: She is not in acute distress. Appearance: She is obese. Comments: Appears anxious with increased respiratory rate on arrival.   HENT:      Head: Normocephalic and atraumatic. Right Ear: External ear normal.      Left Ear: External ear normal.      Nose: Nose normal.      Mouth/Throat:      Mouth: Mucous membranes are moist.      Pharynx: Oropharynx is clear. No oropharyngeal exudate. Eyes:      General: No scleral icterus. Conjunctiva/sclera: Conjunctivae normal.      Pupils: Pupils are equal, round, and reactive to light. Neck:      Musculoskeletal: Neck supple. No neck rigidity. dictation. EMERGENCY DEPARTMENT COURSE and DIFFERENTIAL DIAGNOSIS/MDM:   Vitals:    Vitals:    07/04/20 1357 07/04/20 1503 07/04/20 1600 07/04/20 1700   BP: 126/87 (!) 124/90 126/88 124/86   Pulse: 112 102 106 98   Resp: 20 18 18 17   Temp: 98.2 °F (36.8 °C)      SpO2: 96% 95% 95% 94%   Weight: 260 lb (117.9 kg)      Height: 5' 5\" (1.651 m)          MDM  55-year-old female presents with palpitations, hypertension, and increased respiratory rate. Lab, EKG, and radiology results reviewed. Patient will follow-up with Dr. Lillian Geller her primary doctor, and Dr. Reuben Oropeza, cardiology. I have cautioned patient that she may also need to follow-up with the nephrologist secondary to her mild renal insufficiency at this time. We have discussed the possibility that patient may be having panic attacks. Patient does not think this to be the case. Patient is concerned about the mass on her adrenal gland. Patient thinks that she may have a pheochromocytoma. I have asked patient to follow-up with Dr. Lillian Geller for further testing. Patient will return with chest pain, difficulty breathing, persistent palpitations, or other concerns. Toprol-XL 12.5 mg prescription given today. CONSULTS:  None    PROCEDURES:  Unless otherwise noted below, none     Procedures    FINAL IMPRESSION      1. Hypertension, unspecified type    2. Palpitations    3. Renal insufficiency          DISPOSITION/PLAN   DISPOSITION Decision To Discharge 07/04/2020 05:18:23 PM      PATIENT REFERRED TO:  J Luis Cameron MD  [address] [place] [address]  [phone]    Schedule an appointment as soon as possible for a visit       MD Naren Sanderson [address], [address]  [phone]    Schedule an appointment as soon as possible for a visit     Dr. Reuben Oropeza is 's cardiologist and they have requested to follow-up with him.     DISCHARGE MEDICATIONS:  New Prescriptions    METOPROLOL SUCCINATE (TOPROL XL) 25 MG EXTENDED RELEASE TABLET    Take 0.5 tablets by mouth daily          (Please note that portions of this note were completed with a voice recognition program.  Efforts were made to edit thedictations but occasionally words are mis-transcribed.)    Dana Saucedo MD (electronically signed)  Attending Emergency Physician          Dana Saucedo MD  07/04/20 [id number]

## 2020-07-04 NOTE — PROGRESS NOTES
pH, Arterial 7.470High   7.350 - 7.450 Final 07/04/2020  3:02 PM [facility]   pCO2, Arterial 30. 0Low   35.0 - 45.0 mmHg Final 07/04/2020  3:02 PM [facility]   pO2, Arterial 80.0  80.0 - 100.0 mmHg Final 07/04/2020  3:02 PM [facility]   HCO3, Arterial 21.8Low   22.0 - 26.0 mmol/L Final 07/04/2020  3:02 PM [facility] Excess, Arterial -0.9  -2.0 - 2.0 mmol/L Final 07/04/2020  3:02 PM [facility]   Hemoglobin, Art, Extended 13.0  12.0 - 16.0 g/dL Final 07/04/2020  3:02 PM [address] [place] [address] Lab   O2 Sat, Arterial 95.6  >92 % Final 07/04/2020  3:02 PM [facility]   Carboxyhgb, Arterial 0.7  0.0 - 5.0 % Final 07/04/2020  3:02 PM [facility]        0.0-1.5   (Smokers 1.5-5.0)    Methemoglobin, Arterial 0.7  <1.5 % Final 07/04/2020  3:02 PM [address] [place] [address] Lab   O2 Content, Arterial 17.5  Not Established mL/dL Final 07/04/2020  3:02 PM [address] [place] [address] Lab     Room air. ABG's drawn from [address], site of choice.

## 2020-07-06 LAB — URINE CULTURE, ROUTINE: NORMAL

## 2020-12-22 ENCOUNTER — TELEPHONE (OUTPATIENT)
Dept: CARDIOLOGY | Facility: CLINIC | Age: 55
End: 2020-12-22

## 2021-06-30 ENCOUNTER — APPOINTMENT (OUTPATIENT)
Dept: GENERAL RADIOLOGY | Facility: HOSPITAL | Age: 56
End: 2021-06-30

## 2021-06-30 ENCOUNTER — APPOINTMENT (OUTPATIENT)
Dept: CT IMAGING | Facility: HOSPITAL | Age: 56
End: 2021-06-30

## 2021-06-30 ENCOUNTER — HOSPITAL ENCOUNTER (OUTPATIENT)
Facility: HOSPITAL | Age: 56
Setting detail: OBSERVATION
Discharge: HOME | End: 2021-07-03
Attending: EMERGENCY MEDICINE | Admitting: FAMILY MEDICINE

## 2021-06-30 DIAGNOSIS — N39.0 URINARY TRACT INFECTION WITHOUT HEMATURIA, SITE UNSPECIFIED: ICD-10-CM

## 2021-06-30 DIAGNOSIS — E86.0 SEVERE DEHYDRATION: ICD-10-CM

## 2021-06-30 DIAGNOSIS — R79.89 ELEVATED LACTIC ACID LEVEL: ICD-10-CM

## 2021-06-30 DIAGNOSIS — N17.9 AKI (ACUTE KIDNEY INJURY) (HCC): ICD-10-CM

## 2021-06-30 DIAGNOSIS — R11.2 INTRACTABLE NAUSEA AND VOMITING: Primary | ICD-10-CM

## 2021-06-30 LAB
ALBUMIN SERPL-MCNC: 4.3 G/DL (ref 3.5–5.2)
ALBUMIN/GLOB SERPL: 1.7 G/DL
ALP SERPL-CCNC: 86 U/L (ref 39–117)
ALT SERPL W P-5'-P-CCNC: 35 U/L (ref 1–33)
ANION GAP SERPL CALCULATED.3IONS-SCNC: 12 MMOL/L (ref 5–15)
AST SERPL-CCNC: 24 U/L (ref 1–32)
BACTERIA UR QL AUTO: ABNORMAL /HPF
BASOPHILS # BLD AUTO: 0.04 10*3/MM3 (ref 0–0.2)
BASOPHILS NFR BLD AUTO: 0.3 % (ref 0–1.5)
BILIRUB SERPL-MCNC: 0.4 MG/DL (ref 0–1.2)
BILIRUB UR QL STRIP: NEGATIVE
BUN SERPL-MCNC: 25 MG/DL (ref 6–20)
BUN/CREAT SERPL: 16.3 (ref 7–25)
CALCIUM SPEC-SCNC: 9.3 MG/DL (ref 8.6–10.5)
CHLORIDE SERPL-SCNC: 93 MMOL/L (ref 98–107)
CLARITY UR: CLEAR
CO2 SERPL-SCNC: 26 MMOL/L (ref 22–29)
COLOR UR: YELLOW
CREAT SERPL-MCNC: 1.53 MG/DL (ref 0.57–1)
D-LACTATE SERPL-SCNC: 1.2 MMOL/L (ref 0.5–2)
D-LACTATE SERPL-SCNC: 2.2 MMOL/L (ref 0.5–2)
DEPRECATED RDW RBC AUTO: 50.7 FL (ref 37–54)
EOSINOPHIL # BLD AUTO: 0.14 10*3/MM3 (ref 0–0.4)
EOSINOPHIL NFR BLD AUTO: 0.9 % (ref 0.3–6.2)
ERYTHROCYTE [DISTWIDTH] IN BLOOD BY AUTOMATED COUNT: 15.5 % (ref 12.3–15.4)
FLUAV SUBTYP SPEC NAA+PROBE: NOT DETECTED
FLUBV RNA ISLT QL NAA+PROBE: NOT DETECTED
GFR SERPL CREATININE-BSD FRML MDRD: 35 ML/MIN/1.73
GLOBULIN UR ELPH-MCNC: 2.6 GM/DL
GLUCOSE BLDC GLUCOMTR-MCNC: 132 MG/DL (ref 70–130)
GLUCOSE SERPL-MCNC: 148 MG/DL (ref 65–99)
GLUCOSE UR STRIP-MCNC: NEGATIVE MG/DL
HCT VFR BLD AUTO: 50.2 % (ref 34–46.6)
HGB BLD-MCNC: 16.5 G/DL (ref 12–15.9)
HGB UR QL STRIP.AUTO: NEGATIVE
HOLD SPECIMEN: NORMAL
HOLD SPECIMEN: NORMAL
HYALINE CASTS UR QL AUTO: ABNORMAL /LPF
IMM GRANULOCYTES # BLD AUTO: 0.23 10*3/MM3 (ref 0–0.05)
IMM GRANULOCYTES NFR BLD AUTO: 1.5 % (ref 0–0.5)
KETONES UR QL STRIP: NEGATIVE
LEUKOCYTE ESTERASE UR QL STRIP.AUTO: ABNORMAL
LIPASE SERPL-CCNC: 54 U/L (ref 13–60)
LYMPHOCYTES # BLD AUTO: 1.25 10*3/MM3 (ref 0.7–3.1)
LYMPHOCYTES NFR BLD AUTO: 8.3 % (ref 19.6–45.3)
MCH RBC QN AUTO: 29.6 PG (ref 26.6–33)
MCHC RBC AUTO-ENTMCNC: 32.9 G/DL (ref 31.5–35.7)
MCV RBC AUTO: 90 FL (ref 79–97)
MONOCYTES # BLD AUTO: 0.62 10*3/MM3 (ref 0.1–0.9)
MONOCYTES NFR BLD AUTO: 4.1 % (ref 5–12)
NEUTROPHILS NFR BLD AUTO: 12.81 10*3/MM3 (ref 1.7–7)
NEUTROPHILS NFR BLD AUTO: 84.9 % (ref 42.7–76)
NITRITE UR QL STRIP: NEGATIVE
NRBC BLD AUTO-RTO: 0 /100 WBC (ref 0–0.2)
PH UR STRIP.AUTO: 7 [PH] (ref 5–9)
PLATELET # BLD AUTO: 248 10*3/MM3 (ref 140–450)
PMV BLD AUTO: 9.2 FL (ref 6–12)
POTASSIUM SERPL-SCNC: 4.2 MMOL/L (ref 3.5–5.2)
PROT SERPL-MCNC: 6.9 G/DL (ref 6–8.5)
PROT UR QL STRIP: ABNORMAL
RBC # BLD AUTO: 5.58 10*6/MM3 (ref 3.77–5.28)
RBC # UR: ABNORMAL /HPF
REF LAB TEST METHOD: ABNORMAL
SARS-COV-2 RNA PNL SPEC NAA+PROBE: NOT DETECTED
SODIUM SERPL-SCNC: 131 MMOL/L (ref 136–145)
SP GR UR STRIP: 1.06 (ref 1–1.03)
SQUAMOUS #/AREA URNS HPF: ABNORMAL /HPF
UROBILINOGEN UR QL STRIP: ABNORMAL
WBC # BLD AUTO: 15.09 10*3/MM3 (ref 3.4–10.8)
WBC UR QL AUTO: ABNORMAL /HPF
WHOLE BLOOD HOLD SPECIMEN: NORMAL

## 2021-06-30 PROCEDURE — 81001 URINALYSIS AUTO W/SCOPE: CPT | Performed by: EMERGENCY MEDICINE

## 2021-06-30 PROCEDURE — 96361 HYDRATE IV INFUSION ADD-ON: CPT

## 2021-06-30 PROCEDURE — 83605 ASSAY OF LACTIC ACID: CPT | Performed by: EMERGENCY MEDICINE

## 2021-06-30 PROCEDURE — 25010000002 IOPAMIDOL 61 % SOLUTION: Performed by: EMERGENCY MEDICINE

## 2021-06-30 PROCEDURE — 25010000002 CEFTRIAXONE: Performed by: EMERGENCY MEDICINE

## 2021-06-30 PROCEDURE — 25010000002 ONDANSETRON PER 1 MG: Performed by: EMERGENCY MEDICINE

## 2021-06-30 PROCEDURE — 71045 X-RAY EXAM CHEST 1 VIEW: CPT

## 2021-06-30 PROCEDURE — 25010000002 ONDANSETRON PER 1 MG: Performed by: FAMILY MEDICINE

## 2021-06-30 PROCEDURE — G0378 HOSPITAL OBSERVATION PER HR: HCPCS

## 2021-06-30 PROCEDURE — 74177 CT ABD & PELVIS W/CONTRAST: CPT

## 2021-06-30 PROCEDURE — 94799 UNLISTED PULMONARY SVC/PX: CPT

## 2021-06-30 PROCEDURE — 96376 TX/PRO/DX INJ SAME DRUG ADON: CPT

## 2021-06-30 PROCEDURE — 96365 THER/PROPH/DIAG IV INF INIT: CPT

## 2021-06-30 PROCEDURE — 96375 TX/PRO/DX INJ NEW DRUG ADDON: CPT

## 2021-06-30 PROCEDURE — 83690 ASSAY OF LIPASE: CPT | Performed by: EMERGENCY MEDICINE

## 2021-06-30 PROCEDURE — 36415 COLL VENOUS BLD VENIPUNCTURE: CPT | Performed by: EMERGENCY MEDICINE

## 2021-06-30 PROCEDURE — 87636 SARSCOV2 & INF A&B AMP PRB: CPT | Performed by: EMERGENCY MEDICINE

## 2021-06-30 PROCEDURE — 99285 EMERGENCY DEPT VISIT HI MDM: CPT

## 2021-06-30 PROCEDURE — 82962 GLUCOSE BLOOD TEST: CPT

## 2021-06-30 PROCEDURE — 85025 COMPLETE CBC W/AUTO DIFF WBC: CPT | Performed by: EMERGENCY MEDICINE

## 2021-06-30 PROCEDURE — 80053 COMPREHEN METABOLIC PANEL: CPT | Performed by: EMERGENCY MEDICINE

## 2021-06-30 PROCEDURE — 25010000002 HYDROMORPHONE 1 MG/ML SOLUTION: Performed by: EMERGENCY MEDICINE

## 2021-06-30 PROCEDURE — C9803 HOPD COVID-19 SPEC COLLECT: HCPCS

## 2021-06-30 RX ORDER — SODIUM CHLORIDE 9 MG/ML
100 INJECTION, SOLUTION INTRAVENOUS CONTINUOUS
Status: DISCONTINUED | OUTPATIENT
Start: 2021-06-30 | End: 2021-07-03 | Stop reason: HOSPADM

## 2021-06-30 RX ORDER — NICOTINE POLACRILEX 4 MG
15 LOZENGE BUCCAL
Status: DISCONTINUED | OUTPATIENT
Start: 2021-06-30 | End: 2021-07-03 | Stop reason: HOSPADM

## 2021-06-30 RX ORDER — FLUCONAZOLE 10 MG/ML
POWDER, FOR SUSPENSION ORAL DAILY
Status: ON HOLD | COMMUNITY
End: 2023-01-31

## 2021-06-30 RX ORDER — SODIUM CHLORIDE 9 MG/ML
125 INJECTION, SOLUTION INTRAVENOUS CONTINUOUS
Status: DISCONTINUED | OUTPATIENT
Start: 2021-06-30 | End: 2021-06-30 | Stop reason: SDUPTHER

## 2021-06-30 RX ORDER — ONDANSETRON 2 MG/ML
4 INJECTION INTRAMUSCULAR; INTRAVENOUS EVERY 6 HOURS PRN
Status: DISCONTINUED | OUTPATIENT
Start: 2021-06-30 | End: 2021-07-03 | Stop reason: HOSPADM

## 2021-06-30 RX ORDER — SODIUM CHLORIDE 0.9 % (FLUSH) 0.9 %
10 SYRINGE (ML) INJECTION AS NEEDED
Status: DISCONTINUED | OUTPATIENT
Start: 2021-06-30 | End: 2021-07-03 | Stop reason: HOSPADM

## 2021-06-30 RX ORDER — DEXTROSE MONOHYDRATE 25 G/50ML
25 INJECTION, SOLUTION INTRAVENOUS
Status: DISCONTINUED | OUTPATIENT
Start: 2021-06-30 | End: 2021-07-03 | Stop reason: HOSPADM

## 2021-06-30 RX ORDER — METOCLOPRAMIDE HYDROCHLORIDE 5 MG/ML
5 INJECTION INTRAMUSCULAR; INTRAVENOUS EVERY 6 HOURS PRN
Status: DISCONTINUED | OUTPATIENT
Start: 2021-06-30 | End: 2021-07-03 | Stop reason: HOSPADM

## 2021-06-30 RX ORDER — SODIUM CHLORIDE 0.9 % (FLUSH) 0.9 %
10 SYRINGE (ML) INJECTION EVERY 12 HOURS SCHEDULED
Status: DISCONTINUED | OUTPATIENT
Start: 2021-06-30 | End: 2021-07-03 | Stop reason: HOSPADM

## 2021-06-30 RX ORDER — SUCRALFATE 1 G/1
1 TABLET ORAL 4 TIMES DAILY
Status: ON HOLD | COMMUNITY
End: 2023-01-31

## 2021-06-30 RX ORDER — ONDANSETRON 2 MG/ML
4 INJECTION INTRAMUSCULAR; INTRAVENOUS ONCE
Status: COMPLETED | OUTPATIENT
Start: 2021-06-30 | End: 2021-06-30

## 2021-06-30 RX ORDER — METOPROLOL SUCCINATE 100 MG/1
100 TABLET, EXTENDED RELEASE ORAL DAILY
Status: ON HOLD | COMMUNITY
End: 2023-01-31

## 2021-06-30 RX ORDER — PREDNISONE 1 MG/1
1 TABLET ORAL DAILY
COMMUNITY
End: 2021-07-03 | Stop reason: HOSPADM

## 2021-06-30 RX ORDER — METOPROLOL SUCCINATE 50 MG/1
100 TABLET, EXTENDED RELEASE ORAL DAILY
Status: DISCONTINUED | OUTPATIENT
Start: 2021-06-30 | End: 2021-07-03 | Stop reason: HOSPADM

## 2021-06-30 RX ORDER — LEVOFLOXACIN 250 MG/1
250 TABLET ORAL DAILY
COMMUNITY
End: 2021-07-03 | Stop reason: HOSPADM

## 2021-06-30 RX ADMIN — ONDANSETRON HYDROCHLORIDE 4 MG: 2 INJECTION, SOLUTION INTRAMUSCULAR; INTRAVENOUS at 16:00

## 2021-06-30 RX ADMIN — ONDANSETRON HYDROCHLORIDE 4 MG: 2 INJECTION, SOLUTION INTRAMUSCULAR; INTRAVENOUS at 14:18

## 2021-06-30 RX ADMIN — ONDANSETRON 4 MG: 2 INJECTION INTRAMUSCULAR; INTRAVENOUS at 22:24

## 2021-06-30 RX ADMIN — METOPROLOL SUCCINATE 100 MG: 50 TABLET, EXTENDED RELEASE ORAL at 21:19

## 2021-06-30 RX ADMIN — HYDROMORPHONE HYDROCHLORIDE 1 MG: 1 INJECTION, SOLUTION INTRAMUSCULAR; INTRAVENOUS; SUBCUTANEOUS at 15:03

## 2021-06-30 RX ADMIN — CEFTRIAXONE 1 G: 1 INJECTION, POWDER, FOR SOLUTION INTRAMUSCULAR; INTRAVENOUS at 17:35

## 2021-06-30 RX ADMIN — SODIUM CHLORIDE 125 ML/HR: 900 INJECTION, SOLUTION INTRAVENOUS at 14:18

## 2021-06-30 RX ADMIN — IOPAMIDOL 90 ML: 612 INJECTION, SOLUTION INTRAVENOUS at 15:46

## 2021-07-01 LAB
ALBUMIN SERPL-MCNC: 4.1 G/DL (ref 3.5–5.2)
ALBUMIN/GLOB SERPL: 1.9 G/DL
ALP SERPL-CCNC: 76 U/L (ref 39–117)
ALT SERPL W P-5'-P-CCNC: 37 U/L (ref 1–33)
ANION GAP SERPL CALCULATED.3IONS-SCNC: 8 MMOL/L (ref 5–15)
AST SERPL-CCNC: 21 U/L (ref 1–32)
BASOPHILS # BLD AUTO: 0.02 10*3/MM3 (ref 0–0.2)
BASOPHILS NFR BLD AUTO: 0.2 % (ref 0–1.5)
BILIRUB SERPL-MCNC: 0.3 MG/DL (ref 0–1.2)
BUN SERPL-MCNC: 25 MG/DL (ref 6–20)
BUN/CREAT SERPL: 17.2 (ref 7–25)
CALCIUM SPEC-SCNC: 8.5 MG/DL (ref 8.6–10.5)
CHLORIDE SERPL-SCNC: 97 MMOL/L (ref 98–107)
CO2 SERPL-SCNC: 29 MMOL/L (ref 22–29)
CREAT SERPL-MCNC: 1.45 MG/DL (ref 0.57–1)
DEPRECATED RDW RBC AUTO: 52.9 FL (ref 37–54)
EOSINOPHIL # BLD AUTO: 0.13 10*3/MM3 (ref 0–0.4)
EOSINOPHIL NFR BLD AUTO: 1.4 % (ref 0.3–6.2)
ERYTHROCYTE [DISTWIDTH] IN BLOOD BY AUTOMATED COUNT: 15.6 % (ref 12.3–15.4)
GFR SERPL CREATININE-BSD FRML MDRD: 37 ML/MIN/1.73
GLOBULIN UR ELPH-MCNC: 2.2 GM/DL
GLUCOSE BLDC GLUCOMTR-MCNC: 113 MG/DL (ref 70–130)
GLUCOSE BLDC GLUCOMTR-MCNC: 114 MG/DL (ref 70–130)
GLUCOSE BLDC GLUCOMTR-MCNC: 144 MG/DL (ref 70–130)
GLUCOSE SERPL-MCNC: 106 MG/DL (ref 65–99)
HCT VFR BLD AUTO: 46.5 % (ref 34–46.6)
HGB BLD-MCNC: 14.8 G/DL (ref 12–15.9)
IMM GRANULOCYTES # BLD AUTO: 0.11 10*3/MM3 (ref 0–0.05)
IMM GRANULOCYTES NFR BLD AUTO: 1.2 % (ref 0–0.5)
LIPASE SERPL-CCNC: 40 U/L (ref 13–60)
LYMPHOCYTES # BLD AUTO: 1.5 10*3/MM3 (ref 0.7–3.1)
LYMPHOCYTES NFR BLD AUTO: 16.1 % (ref 19.6–45.3)
MAGNESIUM SERPL-MCNC: 2.2 MG/DL (ref 1.6–2.6)
MCH RBC QN AUTO: 29 PG (ref 26.6–33)
MCHC RBC AUTO-ENTMCNC: 31.8 G/DL (ref 31.5–35.7)
MCV RBC AUTO: 91.2 FL (ref 79–97)
MONOCYTES # BLD AUTO: 0.63 10*3/MM3 (ref 0.1–0.9)
MONOCYTES NFR BLD AUTO: 6.8 % (ref 5–12)
NEUTROPHILS NFR BLD AUTO: 6.91 10*3/MM3 (ref 1.7–7)
NEUTROPHILS NFR BLD AUTO: 74.3 % (ref 42.7–76)
NRBC BLD AUTO-RTO: 0 /100 WBC (ref 0–0.2)
PLATELET # BLD AUTO: 228 10*3/MM3 (ref 140–450)
PMV BLD AUTO: 9.4 FL (ref 6–12)
POTASSIUM SERPL-SCNC: 4.3 MMOL/L (ref 3.5–5.2)
PROT SERPL-MCNC: 6.3 G/DL (ref 6–8.5)
RBC # BLD AUTO: 5.1 10*6/MM3 (ref 3.77–5.28)
SODIUM SERPL-SCNC: 134 MMOL/L (ref 136–145)
WBC # BLD AUTO: 9.3 10*3/MM3 (ref 3.4–10.8)

## 2021-07-01 PROCEDURE — 82962 GLUCOSE BLOOD TEST: CPT

## 2021-07-01 PROCEDURE — 83690 ASSAY OF LIPASE: CPT | Performed by: INTERNAL MEDICINE

## 2021-07-01 PROCEDURE — 80053 COMPREHEN METABOLIC PANEL: CPT | Performed by: INTERNAL MEDICINE

## 2021-07-01 PROCEDURE — G0378 HOSPITAL OBSERVATION PER HR: HCPCS

## 2021-07-01 PROCEDURE — 85025 COMPLETE CBC W/AUTO DIFF WBC: CPT | Performed by: INTERNAL MEDICINE

## 2021-07-01 PROCEDURE — 25010000002 ONDANSETRON PER 1 MG: Performed by: FAMILY MEDICINE

## 2021-07-01 PROCEDURE — 96361 HYDRATE IV INFUSION ADD-ON: CPT

## 2021-07-01 PROCEDURE — 83735 ASSAY OF MAGNESIUM: CPT | Performed by: INTERNAL MEDICINE

## 2021-07-01 PROCEDURE — 96376 TX/PRO/DX INJ SAME DRUG ADON: CPT

## 2021-07-01 RX ORDER — PANTOPRAZOLE SODIUM 40 MG/1
40 TABLET, DELAYED RELEASE ORAL
Status: DISCONTINUED | OUTPATIENT
Start: 2021-07-01 | End: 2021-07-03 | Stop reason: HOSPADM

## 2021-07-01 RX ADMIN — PANTOPRAZOLE SODIUM 40 MG: 40 TABLET, DELAYED RELEASE ORAL at 12:07

## 2021-07-01 RX ADMIN — ONDANSETRON 4 MG: 2 INJECTION INTRAMUSCULAR; INTRAVENOUS at 09:03

## 2021-07-01 RX ADMIN — SODIUM CHLORIDE 100 ML/HR: 9 INJECTION, SOLUTION INTRAVENOUS at 11:16

## 2021-07-01 RX ADMIN — SODIUM CHLORIDE 100 ML/HR: 9 INJECTION, SOLUTION INTRAVENOUS at 22:27

## 2021-07-01 RX ADMIN — METOPROLOL SUCCINATE 100 MG: 50 TABLET, EXTENDED RELEASE ORAL at 08:59

## 2021-07-01 NOTE — H&P
[facility] Medicine Services  INPATIENT PROGRESS NOTE    Length of Stay: 1  Date of Admission: 6/30/2021  Primary Care Physician: Diego Mora MD    Subjective   Chief Complaint: n, v  HPI:    56 yo female with past medical hx of Dm, after being discharged from the hospital on Sunday, 3 days prior to this evaluation.  Patient was in the hospital for pneumonia and also trouble with her bowels.  Patient was having constipation there but was able to go before discharge to the hospital.  Patient states that she is no longer passing gas and has not gone to the bathroom since she left the hospital has had increasing abdominal distention abdominal pain mostly periumbilical but diffusely as well.  Patient has vomited multiple times.  Has had nausea, is unable to hold anything down that she attempts to drink and will vomit that up.  Has had decreased urine output.  No known fevers.  Has had prior surgeries on her abdomen for hernias.  Has had mesh placement for that without known complications though her  states that she is never completely recovered from the surgery a year ago.  Patient has been using MiraLAX at home without improvement    Review of Systems     All pertinent negatives and positives are as above. All other systems have been reviewed and are negative unless otherwise stated.     Objective    Temp:  [98.1 °F (36.7 °C)] 98.1 °F (36.7 °C)  Heart Rate:  [57-71] 63  Resp:  [18-20] 20  BP: (111-159)/(75-94) 117/75    Physical Exam  Vitals and nursing note reviewed.   Constitutional:       General: She is in acute distress.      Appearance: She is well-developed. She is ill-appearing and toxic-appearing. She is not diaphoretic.   HENT:      Head: Normocephalic and atraumatic.      Nose: Nose normal.   Eyes:      General: No scleral icterus.     Conjunctiva/sclera: Conjunctivae normal.   Neck:      Vascular: No JVD.   Cardiovascular:      Rate and Rhythm:  Normal rate and regular rhythm.      Heart sounds: Normal heart sounds. No murmur heard.   No friction rub. No gallop.    Pulmonary:      Effort: Pulmonary effort is normal. No respiratory distress.      Breath sounds: No wheezing or rales.   Chest:      Chest wall: No tenderness.   Abdominal:      General: There is distension.      Palpations: Abdomen is soft. There is no mass.      Tenderness: There is generalized abdominal tenderness and tenderness in the periumbilical area. There is no guarding or rebound. Negative signs include Moreno's sign and McBurney's sign.   Musculoskeletal:         General: No tenderness or deformity. Normal range of motion.      Cervical back: Normal range of motion and neck supple.   Lymphadenopathy:      Cervical: No cervical adenopathy.   Skin:     General: Skin is warm and dry.      Capillary Refill: Capillary refill takes less than 2 seconds.      Coloration: Skin is not pale.      Findings: No erythema or rash.   Neurological:      General: No focal deficit present.      Mental Status: She is alert and oriented to person, place, and time.      Cranial Nerves: No cranial nerve deficit.      Motor: No abnormal muscle tone.   Psychiatric:         Mood and Affect: Mood normal.         Behavior: Behavior normal.         Thought Content: Thought content normal.         Judgment: Judgment normal.        Results Review:  I have reviewed the labs, radiology results, and diagnostic studies.    Laboratory Data:   Results from last 7 days   Lab Units 06/30/21  1418   SODIUM mmol/L 131*   POTASSIUM mmol/L 4.2   CHLORIDE mmol/L 93*   CO2 mmol/L 26.0   BUN mg/dL 25*   CREATININE mg/dL 1.53*   GLUCOSE mg/dL 148*   CALCIUM mg/dL 9.3   BILIRUBIN mg/dL 0.4   ALK PHOS U/L 86   ALT (SGPT) U/L 35*   AST (SGOT) U/L 24   ANION GAP mmol/L 12.0     Estimated Creatinine Clearance: 49.7 mL/min (A) (by C-G formula based on SCr of 1.53 mg/dL (H)).          Results from last 7 days   Lab Units 06/30/21  1418    WBC 10*3/mm3 15.09*   HEMOGLOBIN g/dL 16.5*   HEMATOCRIT % 50.2*   PLATELETS 10*3/mm3 248           Culture Data:   No results found for: BLOODCX  No results found for: URINECX  No results found for: RESPCX  No results found for: WOUNDCX  No results found for: STOOLCX  No components found for: BODYFLD    Radiology Data:   Imaging Results (Last 24 Hours)     Procedure Component Value Units Date/Time    CT Abdomen Pelvis With Contrast [[id number]] Collected: 06/30/21 1537     Updated: 06/30/21 1624    Narrative:      PROCEDURE: CT ABDOMEN PELVIS W CONTRAST    INDICATION: Abdominal pain    HISTORY: Herniorrhaphy, partial liver resection    COMPARISON: None     TECHNIQUE:   - reconstructions:  Axial, coronal, sagittal   - contrast:  oral:  Yes      intravenous:  90 mL of Isovue-300     This exam was performed according to our departmental  dose-optimization program, which includes automated exposure  control, adjustment of the mA and/or kV according to patient size  and/or use of iterative reconstruction technique.  DLP is 1633.4    FINDINGS:      - - - CT ABDOMEN - - -      THORAX (INFERIOR):   - LUNG BASES:  Clear   - PLEURA:  No fluid or mass   - HEART: Normal size, trace pericardial fluid   - MISC:  Small hiatal hernia     ABDOMEN:   - LIVER:  Normal size/contour, no ductal dilatation, no focal  lesion. There is postoperative change in the right lobe of the  liver posteriorly and the liver is diffusely fatty infiltrated.   - GB: Not visualized, and may be contracted or absent.   - CBD:  Grossly negative   - SPLEEN:  Normal size and contour   - PANCREAS:  Normal in size, contour, no focal mass    - VISCERA:  Normal caliber, no wall thickening   - MESENTERY: No mesenteric mass   - CAVITY:  No free abdominal fluid, no free intraperitoneal air   - BODY WALL:  Small umbilical hernia contains only fat   - OSSEOUS:  Grossly negative for age   - MISC:   RETROPERITONEUM:   - KIDNEYS:Normal size/contour, no collecting system  dilation                    No evidence of an enhancing mass. Small  exophytic lesion in the right kidney measuring approximately 1.0  cm in greatest dimension and 25 Hounsfield units, indeterminate  in nature   - URETERS:  Normal course, caliber   - ADRENALS:  Normal size, contour of left adrenal gland. The  right adrenal gland has a nodule measuring 2.1 x 2.0 x 2.0 cm,  also measuring approximately 72 Hounsfield units. Multiphasic  contrast enhanced CT or MRI is suggested for further evaluation   - MISC:  No sig. retroperitoneal adenopathy or mass   - VASCULAR:  Aorta / iliacs: wnl for age     - - - CT PELVIS - - -      - VISCERA:  Normal caliber small/large bowel, no focal  thickening/mass        - APPENDIX: Identified with certainty, but no significant right  lower quadrant inflammatory stranding is evident.   - MESENTERY:  No mass   - VASCULAR:  Within normal limits for age   - CAVITY:  No free fluid / air   - BLADDER:  Unremarkable   - OSSEOUS:  Within normal limits    - MISC:   - UTERUS/OVARY: Uterus contains irregular foci of calcification,  probably representing degenerating fibroids. A very low  attenuation rounded structure in the right adnexal region  measures 2.7 x 2.6 cm, and -136 Hounsfield units, which may  represent a dermoid. Pelvic MRI or surgical evaluation is  recommended.       Impression:      1. No dilated bowel to suggest infarction  2. Hepatic steatosis and postoperative change of the right lobe  of liver as above  3. Indeterminate right adrenal lesion measuring 2.1 cm in  greatest dimension. Multiphasic contrast enhanced CT or MRI,  adrenal protocol recommended for further evaluation  4. Probable right renal cyst measuring 1.0 cm in greatest  dimension. This may also be evaluated during imaging of the right  adrenal lesion.  5. Fatty attenuation lesion in the right adnexal region, may  represent an ovarian dermoid. This could be further evaluated  with pelvic MRI with contrast or surgical  "evaluation.   6. Small hiatal hernia  7. Trace pericardial effusion  8. Please see findings section above for further details    Electronically signed by:  Maranda Martinez MD  6/30/2021 4:23 PM CDT  Workstation: [id number]    XR Chest 1 View [[id number]] Collected: 06/30/21 1438     Updated: 06/30/21 1505    Narrative:      EXAM: XR CHEST 1 VIEW    COMPARISONS: Radiograph 10/22/2020    INDICATION: sob    FINDINGS:  Frontal view of the chest.    Lungs are symmetric and adequately expanded. No focal  consolidation, effusion, or pneumothorax. Cardiomediastinal  silhouette is within normal limits for portable technique. No  acute osseous abnormalities.      Impression:      No acute cardiopulmonary process.    Electronically signed by:  Sacha Romano MD  6/30/2021  3:04 PM CDT Workstation: [id number]          I have reviewed the patient's current medications.     Assessment/Plan     Active Hospital Problems    Diagnosis    • Intractable nausea and vomiting        Plan:      N, V  -intractable n, v  -last colonoscopy and egd \"few years\" ago and normal per pt  -unknown etiology?, could be due to dm gastroparesis  -check hgb a1c, accucheck qachsm ssi  -consider consulting gi if symptoms persists  -try reglan prn       dvt ppx b/l scd  "

## 2021-07-01 NOTE — PROGRESS NOTES
[facility] Medicine Services  INPATIENT PROGRESS NOTE    Length of Stay: 1  Date of Admission: 6/30/2021  Primary Care Physician: Diego Mora MD    Subjective   Chief Complaint: n, v  HPI: pt seen and examined. Pt states her n, v is better than yesterday. No acute overnight events.     Review of Systems     All pertinent negatives and positives are as above. All other systems have been reviewed and are negative unless otherwise stated.     Objective    Temp:  [98.1 °F (36.7 °C)] 98.1 °F (36.7 °C)  Heart Rate:  [52-84] 84  Resp:  [18-20] 18  BP: (111-160)/(61-94) 160/88    Physical Exam  HENT:      Nose: Nose normal.   Cardiovascular:      Rate and Rhythm: Normal rate.   Pulmonary:      Effort: Pulmonary effort is normal.      Breath sounds: Normal breath sounds.   Abdominal:      General: Abdomen is flat. Bowel sounds are normal.      Palpations: Abdomen is soft.   Musculoskeletal:         General: Normal range of motion.      Cervical back: Normal range of motion.   Neurological:      Mental Status: She is alert.             Results Review:  I have reviewed the labs, radiology results, and diagnostic studies.    Laboratory Data:   Results from last 7 days   Lab Units 07/01/21  0653 06/30/21  1418   SODIUM mmol/L 134* 131*   POTASSIUM mmol/L 4.3 4.2   CHLORIDE mmol/L 97* 93*   CO2 mmol/L 29.0 26.0   BUN mg/dL 25* 25*   CREATININE mg/dL 1.45* 1.53*   GLUCOSE mg/dL 106* 148*   CALCIUM mg/dL 8.5* 9.3   BILIRUBIN mg/dL 0.3 0.4   ALK PHOS U/L 76 86   ALT (SGPT) U/L 37* 35*   AST (SGOT) U/L 21 24   ANION GAP mmol/L 8.0 12.0     Estimated Creatinine Clearance: 52.4 mL/min (A) (by C-G formula based on SCr of 1.45 mg/dL (H)).  Results from last 7 days   Lab Units 07/01/21  0653   MAGNESIUM mg/dL 2.2         Results from last 7 days   Lab Units 07/01/21  0653 06/30/21  1418   WBC 10*3/mm3 9.30 15.09*   HEMOGLOBIN g/dL 14.8 16.5*   HEMATOCRIT % 46.5 50.2*   PLATELETS  "10*3/mm3 228 248           Culture Data:   No results found for: BLOODCX  No results found for: URINECX  No results found for: RESPCX  No results found for: WOUNDCX  No results found for: STOOLCX  No components found for: BODYFLD    Radiology Data:   Imaging Results (Last 24 Hours)     ** No results found for the last 24 hours. **          I have reviewed the patient's current medications.     Assessment/Plan     Active Hospital Problems    Diagnosis    • Intractable nausea and vomiting        Plan:    N, V  -intractable n, v  -last colonoscopy and egd \"few years\" ago and normal per pt  -unknown etiology?, could be due to dm gastroparesis vs gastritis/gerd  -hgb a1c in am, accucheck qachsm ssi  -consider consulting gi if symptoms persists  -cw reglan prn  -cw ppi         dvt ppx b/l scd  "

## 2021-07-02 ENCOUNTER — APPOINTMENT (OUTPATIENT)
Dept: CT IMAGING | Facility: HOSPITAL | Age: 56
End: 2021-07-02

## 2021-07-02 LAB
GLUCOSE BLDC GLUCOMTR-MCNC: 101 MG/DL (ref 70–130)
GLUCOSE BLDC GLUCOMTR-MCNC: 104 MG/DL (ref 70–130)
GLUCOSE BLDC GLUCOMTR-MCNC: 106 MG/DL (ref 70–130)
GLUCOSE BLDC GLUCOMTR-MCNC: 133 MG/DL (ref 70–130)
HBA1C MFR BLD: 6.8 % (ref 4.8–5.6)

## 2021-07-02 PROCEDURE — 74176 CT ABD & PELVIS W/O CONTRAST: CPT

## 2021-07-02 PROCEDURE — 96361 HYDRATE IV INFUSION ADD-ON: CPT

## 2021-07-02 PROCEDURE — 96376 TX/PRO/DX INJ SAME DRUG ADON: CPT

## 2021-07-02 PROCEDURE — 82962 GLUCOSE BLOOD TEST: CPT

## 2021-07-02 PROCEDURE — 83036 HEMOGLOBIN GLYCOSYLATED A1C: CPT | Performed by: INTERNAL MEDICINE

## 2021-07-02 PROCEDURE — G0378 HOSPITAL OBSERVATION PER HR: HCPCS

## 2021-07-02 PROCEDURE — 25010000002 ONDANSETRON PER 1 MG: Performed by: FAMILY MEDICINE

## 2021-07-02 RX ORDER — LEVOTHYROXINE SODIUM 0.12 MG/1
125 TABLET ORAL
Status: DISCONTINUED | OUTPATIENT
Start: 2021-07-02 | End: 2021-07-03 | Stop reason: HOSPADM

## 2021-07-02 RX ORDER — POLYETHYLENE GLYCOL 3350 17 G/17G
17 POWDER, FOR SOLUTION ORAL DAILY
Status: DISCONTINUED | OUTPATIENT
Start: 2021-07-02 | End: 2021-07-03 | Stop reason: HOSPADM

## 2021-07-02 RX ADMIN — LEVOTHYROXINE SODIUM 125 MCG: 125 TABLET ORAL at 11:00

## 2021-07-02 RX ADMIN — SODIUM CHLORIDE, PRESERVATIVE FREE 10 ML: 5 INJECTION INTRAVENOUS at 08:27

## 2021-07-02 RX ADMIN — ONDANSETRON 4 MG: 2 INJECTION INTRAMUSCULAR; INTRAVENOUS at 16:09

## 2021-07-02 RX ADMIN — SODIUM CHLORIDE 100 ML/HR: 9 INJECTION, SOLUTION INTRAVENOUS at 08:34

## 2021-07-02 RX ADMIN — SODIUM CHLORIDE 100 ML/HR: 9 INJECTION, SOLUTION INTRAVENOUS at 17:19

## 2021-07-02 RX ADMIN — METOPROLOL SUCCINATE 100 MG: 50 TABLET, EXTENDED RELEASE ORAL at 08:27

## 2021-07-02 RX ADMIN — POLYETHYLENE GLYCOL 3350 17 G: 17 POWDER, FOR SOLUTION ORAL at 15:35

## 2021-07-02 RX ADMIN — PANTOPRAZOLE SODIUM 40 MG: 40 TABLET, DELAYED RELEASE ORAL at 05:44

## 2021-07-02 NOTE — PROGRESS NOTES
[facility] Medicine Services  INPATIENT PROGRESS NOTE    Length of Stay: 1  Date of Admission: 6/30/2021  Primary Care Physician: Diego Mora MD    Subjective   Chief Complaint: Nausea  HPI:    Patient seen and examined today.  Reports that her vomiting is better still having some nausea.  She has had some abdominal pain reports having constipation, has not had a bowel movement and 12 days.  Has some flatus.  Reports being admitted at a different hospital last week and he had to give her enemas to help her go.    Review of Systems   Respiratory: Negative for shortness of breath.    Cardiovascular: Negative for chest pain.        All pertinent negatives and positives are as above. All other systems have been reviewed and are negative unless otherwise stated.     Objective    Temp:  [96.9 °F (36.1 °C)-99.2 °F (37.3 °C)] 97.3 °F (36.3 °C)  Heart Rate:  [58-67] 65  Resp:  [18] 18  BP: (120-152)/(77-88) 140/88  Physical Exam  Vitals and nursing note reviewed.   Constitutional:       General: She is not in acute distress.     Appearance: She is well-developed. She is not diaphoretic.   HENT:      Head: Normocephalic and atraumatic.   Cardiovascular:      Rate and Rhythm: Normal rate.   Pulmonary:      Effort: Pulmonary effort is normal. No respiratory distress.      Breath sounds: No wheezing.   Abdominal:      General: There is no distension.      Palpations: Abdomen is soft.   Musculoskeletal:         General: Normal range of motion.   Skin:     General: Skin is warm and dry.   Neurological:      Mental Status: She is alert.      Cranial Nerves: No cranial nerve deficit.   Psychiatric:         Behavior: Behavior normal.         Thought Content: Thought content normal.         Judgment: Judgment normal.             Results Review:  I have reviewed the labs, radiology results, and diagnostic studies.    Laboratory Data:   Lab Results (last 24 hours)     Procedure  Component Value Units Date/Time    POC Glucose Once [[id number]]  (Normal) Collected: 07/02/21 0603    Specimen: Blood Updated: 07/02/21 0631     Glucose 106 mg/dL      Comment: : [id number] WIN Anderson ID: [id number]       Hemoglobin A1c [[id number]]  (Abnormal) Collected: 07/02/21 0510    Specimen: Blood Updated: 07/02/21 0546     Hemoglobin A1C 6.80 %     Narrative:      Hemoglobin A1C Ranges:    Increased Risk for Diabetes  5.7% to 6.4%  Diabetes                     >= 6.5%  Diabetic Goal                < 7.0%    POC Glucose Once [[id number]]  (Normal) Collected: 07/01/21 1942    Specimen: Blood Updated: 07/01/21 2015     Glucose 113 mg/dL      Comment: RN NotifiedOperator: [id number] AZUL Alonso ID: [id number]       POC Glucose Once [[id number]]  (Abnormal) Collected: 07/01/21 1602    Specimen: Blood Updated: 07/01/21 1631     Glucose 144 mg/dL      Comment: RN NotifiedOperator: [id number] CHATO Montes ID: [id number]             Culture Data:   No results found for: BLOODCX  No results found for: URINECX  No results found for: RESPCX  No results found for: WOUNDCX  No results found for: STOOLCX  No components found for: BODYFLD    Radiology Data:   Imaging Results (Last 24 Hours)     ** No results found for the last 24 hours. **          I have reviewed the patient's current medications.     Assessment/Plan     Active Hospital Problems    Diagnosis    • Intractable nausea and vomiting        Nausea/vomiting-has been resolving, will continue with IV Zofran as needed    Acute kidney injury-continue with IV fluids, creatinine has been improving    Constipation-possible small bowel obstruction, CT of the abdomen pelvis will be ordered, will continue to monitor    Diabetes-sliding scale insulin will be continued and continue with glucose monitoring    DVT prophylaxis-SCD            Isaac Noble MD   07/02/21   10:18 CDT

## 2021-07-02 NOTE — PROGRESS NOTES
Adult Nutrition  Assessment    Patient Name:  Ana Small  YOB: 1965  MRN: [id number]  Admit Date:  6/30/2021    Assessment Date:  7/2/2021    Comments:  56yo female admit w/ n/v and distended abdomen. MD notes pt continues to c/o nausea and gas today and reports no BM in 12 days. CT of abdomen pending. Hx of thyroidectomy r/t cancer. Full liquids in place. #, BMI 42.9, indicates obesity.  RD to follow hospital course.    Reason for Assessment     Row Name 07/02/21 1206          Reason for Assessment    Reason For Assessment  identified at risk by screening criteria     Diagnosis  gastrointestinal disease     Identified At Risk by Screening Criteria  MST SCORE 2+         Nutrition/Diet History     Row Name 07/02/21 1207          Nutrition/Diet History    Typical Food/Fluid Intake  RD attempted to visit x2 ---with staff & then provider.         Anthropometrics     Row Name 07/02/21 0434          Anthropometrics    Weight  114 kg (250 lb 6.4 oz)         Labs/Tests/Procedures/Meds     Row Name 07/02/21 1207          Labs/Procedures/Meds    Lab Results Reviewed  reviewed     Lab Results Comments  A1C 6.8, Glu 106-114, BUN 25H/Cr 1.4H, no alb        Diagnostic Tests/Procedures    Diagnostic Test/Procedure Reviewed  reviewed        Medications    Pertinent Medications Reviewed  reviewed     Pertinent Medications Comments  SSi           Estimated/Assessed Needs     Row Name 07/02/21 1208          Calculation Measurements    Weight Used For Calculations  113 kg (250 lb)        Estimated/Assessed Needs    Additional Documentation  Fluid Requirements (Group);Protein Requirements (Group);Calorie Requirements (Group);KCAL/KG (Group)        Calorie Requirements    Estimated Calorie Requirement (kcal/day)  2000        KCAL/KG    KCAL/KG  18 Kcal/Kg (kcal)     18 Kcal/Kg (kcal)  2041.182        Protein Requirements    Weight Used For Protein Calculations  113 kg (250 lb)     Est Protein Requirement Amount  (gms/kg)  0.8 gm protein     Estimated Protein Requirements (gms/day)  90.72        Fluid Requirements    Fluid Requirements (mL/day)  2200     RDA Method (mL)  2200         Nutrition Prescription Ordered     Row Name 07/02/21 1208          Nutrition Prescription PO    Current PO Diet  Full Liquid                 Electronically signed by:  Emilee Jesus RD  07/02/21 12:10 CDT

## 2021-07-02 NOTE — PLAN OF CARE
Goal Outcome Evaluation:  Plan of Care Reviewed With: patient        Progress: no change  Outcome Summary: Vss. Patient received tap water enema with effectiveness. C/o abdominal pain but refused an intervention d/t constipation. No s/s of distress at this time. Will continue to monitor this pt.

## 2021-07-02 NOTE — PLAN OF CARE
Problem: Adult Inpatient Plan of Care  Goal: Plan of Care Review  Outcome: Ongoing, Progressing   Goal Outcome Evaluation:      n/v and distended abdomen. MD notes pt continues to c/o nausea and gas today and reports no BM in 12 days. CT of abdomen pending. Hx of thyroidectomy r/t cancer. Full liquids in place. #, BMI 42.9, indicates obesity.  RD following.

## 2021-07-03 VITALS
WEIGHT: 250.4 LBS | HEART RATE: 67 BPM | TEMPERATURE: 97.1 F | DIASTOLIC BLOOD PRESSURE: 73 MMHG | OXYGEN SATURATION: 95 % | HEIGHT: 64 IN | RESPIRATION RATE: 18 BRPM | BODY MASS INDEX: 42.75 KG/M2 | SYSTOLIC BLOOD PRESSURE: 127 MMHG

## 2021-07-03 LAB
ALBUMIN SERPL-MCNC: 3.6 G/DL (ref 3.5–5.2)
ALBUMIN/GLOB SERPL: 1.6 G/DL
ALP SERPL-CCNC: 79 U/L (ref 39–117)
ALT SERPL W P-5'-P-CCNC: 43 U/L (ref 1–33)
ANION GAP SERPL CALCULATED.3IONS-SCNC: 10 MMOL/L (ref 5–15)
AST SERPL-CCNC: 25 U/L (ref 1–32)
BASOPHILS # BLD AUTO: 0.01 10*3/MM3 (ref 0–0.2)
BASOPHILS NFR BLD AUTO: 0.1 % (ref 0–1.5)
BILIRUB SERPL-MCNC: 0.4 MG/DL (ref 0–1.2)
BUN SERPL-MCNC: 9 MG/DL (ref 6–20)
BUN/CREAT SERPL: 7 (ref 7–25)
CALCIUM SPEC-SCNC: 7.3 MG/DL (ref 8.6–10.5)
CHLORIDE SERPL-SCNC: 101 MMOL/L (ref 98–107)
CO2 SERPL-SCNC: 25 MMOL/L (ref 22–29)
CREAT SERPL-MCNC: 1.28 MG/DL (ref 0.57–1)
DEPRECATED RDW RBC AUTO: 52.2 FL (ref 37–54)
EOSINOPHIL # BLD AUTO: 0.16 10*3/MM3 (ref 0–0.4)
EOSINOPHIL NFR BLD AUTO: 1.7 % (ref 0.3–6.2)
ERYTHROCYTE [DISTWIDTH] IN BLOOD BY AUTOMATED COUNT: 16.6 % (ref 12.3–15.4)
GFR SERPL CREATININE-BSD FRML MDRD: 43 ML/MIN/1.73
GLOBULIN UR ELPH-MCNC: 2.3 GM/DL
GLUCOSE BLDC GLUCOMTR-MCNC: 107 MG/DL (ref 70–130)
GLUCOSE BLDC GLUCOMTR-MCNC: 112 MG/DL (ref 70–130)
GLUCOSE SERPL-MCNC: 111 MG/DL (ref 65–99)
HCT VFR BLD AUTO: 41.8 % (ref 34–46.6)
HGB BLD-MCNC: 13.9 G/DL (ref 12–15.9)
IMM GRANULOCYTES # BLD AUTO: 0.07 10*3/MM3 (ref 0–0.05)
IMM GRANULOCYTES NFR BLD AUTO: 0.7 % (ref 0–0.5)
LYMPHOCYTES # BLD AUTO: 1.26 10*3/MM3 (ref 0.7–3.1)
LYMPHOCYTES NFR BLD AUTO: 13.3 % (ref 19.6–45.3)
MCH RBC QN AUTO: 30.6 PG (ref 26.6–33)
MCHC RBC AUTO-ENTMCNC: 33.3 G/DL (ref 31.5–35.7)
MCV RBC AUTO: 92.1 FL (ref 79–97)
MONOCYTES # BLD AUTO: 0.65 10*3/MM3 (ref 0.1–0.9)
MONOCYTES NFR BLD AUTO: 6.9 % (ref 5–12)
NEUTROPHILS NFR BLD AUTO: 7.32 10*3/MM3 (ref 1.7–7)
NEUTROPHILS NFR BLD AUTO: 77.3 % (ref 42.7–76)
NRBC BLD AUTO-RTO: 0 /100 WBC (ref 0–0.2)
PLATELET # BLD AUTO: 193 10*3/MM3 (ref 140–450)
PMV BLD AUTO: 9.4 FL (ref 6–12)
POTASSIUM SERPL-SCNC: 3.6 MMOL/L (ref 3.5–5.2)
PROT SERPL-MCNC: 5.9 G/DL (ref 6–8.5)
RBC # BLD AUTO: 4.54 10*6/MM3 (ref 3.77–5.28)
SODIUM SERPL-SCNC: 136 MMOL/L (ref 136–145)
WBC # BLD AUTO: 9.47 10*3/MM3 (ref 3.4–10.8)

## 2021-07-03 PROCEDURE — 85025 COMPLETE CBC W/AUTO DIFF WBC: CPT | Performed by: FAMILY MEDICINE

## 2021-07-03 PROCEDURE — 80053 COMPREHEN METABOLIC PANEL: CPT | Performed by: FAMILY MEDICINE

## 2021-07-03 PROCEDURE — 96361 HYDRATE IV INFUSION ADD-ON: CPT

## 2021-07-03 PROCEDURE — G0378 HOSPITAL OBSERVATION PER HR: HCPCS

## 2021-07-03 PROCEDURE — 82962 GLUCOSE BLOOD TEST: CPT

## 2021-07-03 RX ORDER — PANTOPRAZOLE SODIUM 40 MG/1
40 TABLET, DELAYED RELEASE ORAL DAILY
Qty: 30 TABLET | Refills: 0 | Status: ON HOLD | OUTPATIENT
Start: 2021-07-03 | End: 2023-01-31

## 2021-07-03 RX ORDER — ONDANSETRON 4 MG/1
4 TABLET, FILM COATED ORAL EVERY 8 HOURS PRN
Qty: 30 TABLET | Refills: 0 | Status: ON HOLD | OUTPATIENT
Start: 2021-07-03 | End: 2023-01-31

## 2021-07-03 RX ADMIN — SODIUM CHLORIDE, PRESERVATIVE FREE 10 ML: 5 INJECTION INTRAVENOUS at 08:09

## 2021-07-03 RX ADMIN — SODIUM CHLORIDE 100 ML/HR: 9 INJECTION, SOLUTION INTRAVENOUS at 04:12

## 2021-07-03 RX ADMIN — PANTOPRAZOLE SODIUM 40 MG: 40 TABLET, DELAYED RELEASE ORAL at 06:25

## 2021-07-03 RX ADMIN — LEVOTHYROXINE SODIUM 125 MCG: 125 TABLET ORAL at 06:25

## 2021-07-03 RX ADMIN — POLYETHYLENE GLYCOL 3350 17 G: 17 POWDER, FOR SOLUTION ORAL at 08:09

## 2021-07-03 RX ADMIN — METOPROLOL SUCCINATE 100 MG: 50 TABLET, EXTENDED RELEASE ORAL at 08:09

## 2021-07-03 NOTE — NURSING NOTE
Patient states abdominal pain is coming back some, but does not want to take anything for pain or reglan at this time.

## 2021-07-03 NOTE — PLAN OF CARE
Goal Outcome Evaluation:           Progress: improving  Outcome Summary: vss; no s/s of distress; states abdominal pain is better; will continue to monitor

## 2021-07-03 NOTE — DISCHARGE SUMMARY
[facility] Medicine Services  DISCHARGE SUMMARY       Date of Admission: 6/30/2021  Date of Discharge:  7/3/2021  Primary Care Physician: Diego Mora MD    Presenting Problem/History of Present Illness:  Severe dehydration [E86.0]  MELISSA (acute kidney injury) (CMS/[facility]) [N17.9]  Elevated lactic acid level [R79.89]  Intractable nausea and vomiting [R11.2]  Urinary tract infection without hematuria, site unspecified [N39.0]       Final Discharge Diagnoses:  Active Hospital Problems    Diagnosis    • Intractable nausea and vomiting        Consults:   Consults     No orders found from 6/1/2021 to 7/1/2021.              Chief Complaint on Day of Discharge: Abdominal pain    Hospital Course:  The patient is a 55 y.o. female who presented to [facility] with past medical hx of Dm, after being discharged from the hospital on Sunday, 3 days prior to this evaluation.  Patient was in the hospital for pneumonia and also trouble with her bowels.  Patient was having constipation there but was able to go before discharge to the hospital.  Patient states that she is no longer passing gas and has not gone to the bathroom since she left the hospital has had increasing abdominal distention abdominal pain mostly periumbilical but diffusely as well.  Patient has vomited multiple times.  Has had nausea, is unable to hold anything down that she attempts to drink and will vomit that up.  Has had decreased urine output.  No known fevers.  Has had prior surgeries on her abdomen for hernias.  Has had mesh placement for that without known complications though her  states that she is never completely recovered from the surgery a year ago.  Patient was admitted to the hospital started on antiemetic medications and she did well and she was tolerating a diet and was no longer having any nausea or vomiting.  We did give her an enema and she did have a large bowel movement  "after that.  CT scan was unremarkable except for adrenal gland lesion which she is already aware of and is being monitored as an outpatient for the past 2 years.  Patient was asked to follow-up with PCP and gastroenterology for further work-up.    Condition on Discharge: Stable    Physical Exam on Discharge:  /73 (BP Location: Left arm, Patient Position: Sitting)   Pulse 67   Temp 97.1 °F (36.2 °C) (Temporal)   Resp 18   Ht 162.6 cm (64\")   Wt 114 kg (250 lb 6.4 oz)   SpO2 95%   BMI 42.98 kg/m²   Physical Exam  Vitals reviewed.   Constitutional:       General: She is not in acute distress.     Appearance: She is well-developed. She is not diaphoretic.   HENT:      Head: Normocephalic and atraumatic.   Cardiovascular:      Rate and Rhythm: Normal rate.   Pulmonary:      Effort: Pulmonary effort is normal. No respiratory distress.      Breath sounds: No wheezing.   Abdominal:      General: There is no distension.      Palpations: Abdomen is soft.   Musculoskeletal:         General: Normal range of motion.   Skin:     General: Skin is warm and dry.   Neurological:      Mental Status: She is alert.      Cranial Nerves: No cranial nerve deficit.   Psychiatric:         Behavior: Behavior normal.         Thought Content: Thought content normal.         Judgment: Judgment normal.           Discharge Disposition:  Home or Self Care    Discharge Medications:     Discharge Medications      New Medications      Instructions Start Date   ondansetron 4 MG tablet  Commonly known as: Zofran   4 mg, Oral, Every 8 Hours PRN      pantoprazole 40 MG EC tablet  Commonly known as: PROTONIX   40 mg, Oral, Daily         Continue These Medications      Instructions Start Date   fluconazole 10 MG/ML suspension  Commonly known as: DIFLUCAN   Oral, Daily      FLUoxetine 20 MG capsule  Commonly known as: PROzac   20 mg, Oral, Daily      levothyroxine 125 MCG tablet  Commonly known as: SYNTHROID, LEVOTHROID   125 mcg, Oral, Daily    "   metFORMIN 1000 MG tablet  Commonly known as: GLUCOPHAGE   1,000 mg, Oral, 2 Times Daily With Meals      metoprolol succinate  MG 24 hr tablet  Commonly known as: TOPROL-XL   100 mg, Oral, Daily      ProAir  (90 Base) MCG/ACT inhaler  Generic drug: albuterol sulfate HFA   Every 8 Hours Scheduled      sucralfate 1 g tablet  Commonly known as: CARAFATE   1 g, Oral, 4 Times Daily      triamcinolone 0.1 % cream  Commonly known as: KENALOG   triamcinolone 0.1 % topical ointment and dimethicone 5 % topical cream   apply thin layer to the affected area 3 times per day         Stop These Medications    levoFLOXacin 250 MG tablet  Commonly known as: LEVAQUIN     predniSONE 1 MG tablet  Commonly known as: DELTASONE            Discharge Diet:   Diet Instructions     Diet: Consistent Carbohydrate, Cardiac      Discharge Diet:  Consistent Carbohydrate  Cardiac             Activity at Discharge:   Activity Instructions     Activity as Tolerated            Discharge Care Plan/Instructions: Continue with medications, follow-up with PCP and gastroenterology    Follow-up Appointments: Appointment request have been made for PCP and gastroenterology      Isaac Noble MD  07/03/21  10:48 CDT

## 2022-03-07 ENCOUNTER — APPOINTMENT (OUTPATIENT)
Dept: CT IMAGING | Facility: HOSPITAL | Age: 57
End: 2022-03-07

## 2022-03-07 ENCOUNTER — HOSPITAL ENCOUNTER (EMERGENCY)
Facility: HOSPITAL | Age: 57
Discharge: HOME | End: 2022-03-07
Attending: STUDENT IN AN ORGANIZED HEALTH CARE EDUCATION/TRAINING PROGRAM | Admitting: STUDENT IN AN ORGANIZED HEALTH CARE EDUCATION/TRAINING PROGRAM

## 2022-03-07 VITALS
HEIGHT: 63 IN | HEART RATE: 83 BPM | RESPIRATION RATE: 18 BRPM | OXYGEN SATURATION: 97 % | TEMPERATURE: 97.7 F | BODY MASS INDEX: 46.1 KG/M2 | DIASTOLIC BLOOD PRESSURE: 85 MMHG | SYSTOLIC BLOOD PRESSURE: 133 MMHG | WEIGHT: 260.2 LBS

## 2022-03-07 DIAGNOSIS — R10.84 GENERALIZED ABDOMINAL PAIN: ICD-10-CM

## 2022-03-07 DIAGNOSIS — R11.2 NON-INTRACTABLE VOMITING WITH NAUSEA, UNSPECIFIED VOMITING TYPE: Primary | ICD-10-CM

## 2022-03-07 LAB
ALBUMIN SERPL-MCNC: 4.4 G/DL (ref 3.5–5.2)
ALBUMIN/GLOB SERPL: 1.8 G/DL
ALP SERPL-CCNC: 105 U/L (ref 39–117)
ALT SERPL W P-5'-P-CCNC: 18 U/L (ref 1–33)
ANION GAP SERPL CALCULATED.3IONS-SCNC: 14 MMOL/L (ref 5–15)
AST SERPL-CCNC: 20 U/L (ref 1–32)
BACTERIA UR QL AUTO: ABNORMAL /HPF
BASOPHILS # BLD AUTO: 0.05 10*3/MM3 (ref 0–0.2)
BASOPHILS NFR BLD AUTO: 0.7 % (ref 0–1.5)
BILIRUB SERPL-MCNC: 0.3 MG/DL (ref 0–1.2)
BILIRUB UR QL STRIP: NEGATIVE
BUN SERPL-MCNC: 17 MG/DL (ref 6–20)
BUN/CREAT SERPL: 13.8 (ref 7–25)
CALCIUM SPEC-SCNC: 8.2 MG/DL (ref 8.6–10.5)
CHLORIDE SERPL-SCNC: 101 MMOL/L (ref 98–107)
CLARITY UR: CLEAR
CO2 SERPL-SCNC: 24 MMOL/L (ref 22–29)
COLOR UR: YELLOW
CREAT SERPL-MCNC: 1.23 MG/DL (ref 0.57–1)
DEPRECATED RDW RBC AUTO: 50.9 FL (ref 37–54)
EGFRCR SERPLBLD CKD-EPI 2021: 51 ML/MIN/1.73
EOSINOPHIL # BLD AUTO: 0.57 10*3/MM3 (ref 0–0.4)
EOSINOPHIL NFR BLD AUTO: 8.5 % (ref 0.3–6.2)
ERYTHROCYTE [DISTWIDTH] IN BLOOD BY AUTOMATED COUNT: 15.5 % (ref 12.3–15.4)
GLOBULIN UR ELPH-MCNC: 2.4 GM/DL
GLUCOSE SERPL-MCNC: 104 MG/DL (ref 65–99)
GLUCOSE UR STRIP-MCNC: NEGATIVE MG/DL
HCT VFR BLD AUTO: 44.7 % (ref 34–46.6)
HGB BLD-MCNC: 15.4 G/DL (ref 12–15.9)
HGB UR QL STRIP.AUTO: NEGATIVE
HOLD SPECIMEN: NORMAL
HYALINE CASTS UR QL AUTO: ABNORMAL /LPF
IMM GRANULOCYTES # BLD AUTO: 0.03 10*3/MM3 (ref 0–0.05)
IMM GRANULOCYTES NFR BLD AUTO: 0.4 % (ref 0–0.5)
KETONES UR QL STRIP: ABNORMAL
LEUKOCYTE ESTERASE UR QL STRIP.AUTO: ABNORMAL
LIPASE SERPL-CCNC: 36 U/L (ref 13–60)
LYMPHOCYTES # BLD AUTO: 1.48 10*3/MM3 (ref 0.7–3.1)
LYMPHOCYTES NFR BLD AUTO: 22.2 % (ref 19.6–45.3)
MAGNESIUM SERPL-MCNC: 2.3 MG/DL (ref 1.6–2.6)
MCH RBC QN AUTO: 30.9 PG (ref 26.6–33)
MCHC RBC AUTO-ENTMCNC: 34.5 G/DL (ref 31.5–35.7)
MCV RBC AUTO: 89.6 FL (ref 79–97)
MONOCYTES # BLD AUTO: 0.4 10*3/MM3 (ref 0.1–0.9)
MONOCYTES NFR BLD AUTO: 6 % (ref 5–12)
NEUTROPHILS NFR BLD AUTO: 4.14 10*3/MM3 (ref 1.7–7)
NEUTROPHILS NFR BLD AUTO: 62.2 % (ref 42.7–76)
NITRITE UR QL STRIP: NEGATIVE
NRBC BLD AUTO-RTO: 0 /100 WBC (ref 0–0.2)
PH UR STRIP.AUTO: 6 [PH] (ref 5–9)
PLATELET # BLD AUTO: 296 10*3/MM3 (ref 140–450)
PMV BLD AUTO: 9.2 FL (ref 6–12)
POTASSIUM SERPL-SCNC: 4.8 MMOL/L (ref 3.5–5.2)
PROT SERPL-MCNC: 6.8 G/DL (ref 6–8.5)
PROT UR QL STRIP: NEGATIVE
RBC # BLD AUTO: 4.99 10*6/MM3 (ref 3.77–5.28)
RBC # UR STRIP: ABNORMAL /HPF
REF LAB TEST METHOD: ABNORMAL
SODIUM SERPL-SCNC: 139 MMOL/L (ref 136–145)
SP GR UR STRIP: >1.099 (ref 1–1.03)
SQUAMOUS #/AREA URNS HPF: ABNORMAL /HPF
TRANS CELLS #/AREA URNS HPF: ABNORMAL /HPF
UROBILINOGEN UR QL STRIP: ABNORMAL
WBC # UR STRIP: ABNORMAL /HPF
WBC NRBC COR # BLD: 6.67 10*3/MM3 (ref 3.4–10.8)
WHOLE BLOOD HOLD SPECIMEN: NORMAL

## 2022-03-07 PROCEDURE — 93005 ELECTROCARDIOGRAM TRACING: CPT

## 2022-03-07 PROCEDURE — 93010 ELECTROCARDIOGRAM REPORT: CPT | Performed by: INTERNAL MEDICINE

## 2022-03-07 PROCEDURE — 25010000002 ONDANSETRON PER 1 MG: Performed by: STUDENT IN AN ORGANIZED HEALTH CARE EDUCATION/TRAINING PROGRAM

## 2022-03-07 PROCEDURE — 80053 COMPREHEN METABOLIC PANEL: CPT | Performed by: STUDENT IN AN ORGANIZED HEALTH CARE EDUCATION/TRAINING PROGRAM

## 2022-03-07 PROCEDURE — 83690 ASSAY OF LIPASE: CPT | Performed by: STUDENT IN AN ORGANIZED HEALTH CARE EDUCATION/TRAINING PROGRAM

## 2022-03-07 PROCEDURE — 96374 THER/PROPH/DIAG INJ IV PUSH: CPT

## 2022-03-07 PROCEDURE — 99283 EMERGENCY DEPT VISIT LOW MDM: CPT

## 2022-03-07 PROCEDURE — 81001 URINALYSIS AUTO W/SCOPE: CPT | Performed by: STUDENT IN AN ORGANIZED HEALTH CARE EDUCATION/TRAINING PROGRAM

## 2022-03-07 PROCEDURE — 85025 COMPLETE CBC W/AUTO DIFF WBC: CPT | Performed by: STUDENT IN AN ORGANIZED HEALTH CARE EDUCATION/TRAINING PROGRAM

## 2022-03-07 PROCEDURE — 74177 CT ABD & PELVIS W/CONTRAST: CPT

## 2022-03-07 PROCEDURE — 93005 ELECTROCARDIOGRAM TRACING: CPT | Performed by: STUDENT IN AN ORGANIZED HEALTH CARE EDUCATION/TRAINING PROGRAM

## 2022-03-07 PROCEDURE — 83735 ASSAY OF MAGNESIUM: CPT | Performed by: STUDENT IN AN ORGANIZED HEALTH CARE EDUCATION/TRAINING PROGRAM

## 2022-03-07 PROCEDURE — 25010000002 IOPAMIDOL 61 % SOLUTION: Performed by: STUDENT IN AN ORGANIZED HEALTH CARE EDUCATION/TRAINING PROGRAM

## 2022-03-07 RX ORDER — ONDANSETRON 4 MG/1
4 TABLET, ORALLY DISINTEGRATING ORAL 4 TIMES DAILY PRN
Qty: 12 TABLET | Refills: 0 | Status: SHIPPED | OUTPATIENT
Start: 2022-03-07

## 2022-03-07 RX ORDER — ONDANSETRON 4 MG/1
4 TABLET, ORALLY DISINTEGRATING ORAL 4 TIMES DAILY PRN
Qty: 12 TABLET | Refills: 0 | Status: ON HOLD | OUTPATIENT
Start: 2022-03-07 | End: 2023-01-31

## 2022-03-07 RX ORDER — SIMETHICONE 125 MG
180 TABLET,CHEWABLE ORAL EVERY 6 HOURS PRN
Status: ON HOLD | COMMUNITY
End: 2023-01-31

## 2022-03-07 RX ORDER — METOCLOPRAMIDE 5 MG/1
5 TABLET ORAL
COMMUNITY

## 2022-03-07 RX ORDER — LEVOTHYROXINE SODIUM 0.12 MG/1
125 TABLET ORAL DAILY
COMMUNITY

## 2022-03-07 RX ORDER — ONDANSETRON 2 MG/ML
4 INJECTION INTRAMUSCULAR; INTRAVENOUS ONCE
Status: COMPLETED | OUTPATIENT
Start: 2022-03-07 | End: 2022-03-07

## 2022-03-07 RX ADMIN — IOPAMIDOL 90 ML: 612 INJECTION, SOLUTION INTRAVENOUS at 08:52

## 2022-03-07 RX ADMIN — ONDANSETRON 4 MG: 2 INJECTION INTRAMUSCULAR; INTRAVENOUS at 09:30

## 2022-03-07 NOTE — ED PROVIDER NOTES
Subjective   56-year-old female history of bowel blockages from multiple surgeries comes to the ER chief complaint of abdominal pain, nausea, vomiting.  Pain is sharp and radiates into her chest.  Started earlier this morning.  Nothing makes it better or worse.  Patient reports that she had it a year ago when she was in the hospital.      History provided by:  Patient   used: No        Review of Systems   Constitutional: Positive for appetite change. Negative for chills and fever.   HENT: Negative for congestion and rhinorrhea.    Respiratory: Negative for cough and shortness of breath.    Cardiovascular: Negative for chest pain and palpitations.   Gastrointestinal: Positive for abdominal pain, nausea and vomiting.   Genitourinary: Negative for dysuria and flank pain.   Skin: Negative for color change and rash.   Neurological: Negative for dizziness and headaches.   Psychiatric/Behavioral: Negative for agitation. The patient is not nervous/anxious.        Past Medical History:   Diagnosis Date   • Diabetes mellitus (HCC)    • Disease of thyroid gland     PARATHYROID TUMOR REMOVED   • Hypertension    • Migraines    • Pneumonia    • PONV (postoperative nausea and vomiting)    • Seizures (HCC) , 2016    X 3 TOTAL   • Stroke (HCC)     APHASIA, SLIGHT WEAKNESS ON RIGHT SIDE (INTENSIFIED WITH FATIGUE)   • SVT (supraventricular tachycardia) ([facility])    • Tachycardia        Allergies   Allergen Reactions   • Latex Anaphylaxis     CALLED TO [facility] IN SURGERY SCHEDULING.   • Sulfa Antibiotics Anaphylaxis       Past Surgical History:   Procedure Laterality Date   • BREAST BIOPSY Right 2017    Procedure: RIGHT BREAST EXCISIONAL BIOPSY;  Surgeon: Radha Moseley MD;  [facility];  Service:    • CARDIAC ABLATION  2017   •  SECTION     • HEMANGIOMA EXCISION      from liver.    • HERNIA REPAIR     • LIVER RESECTION      Giant hemangioma   • PARATHYROID GLAND SURGERY     • PILONIDAL  CYSTECTOMY     • TONSILLECTOMY         Family History   Problem Relation Age of Onset   • Breast cancer Maternal Aunt    • Cancer Mother         LIVER   • Diabetes Mother    • Heart disease Mother        Social History     Socioeconomic History   • Marital status:    • Number of children: 2   Tobacco Use   • Smoking status: Former Smoker     Packs/day: 0.50     Years: 20.00     Pack years: 10.00     Types: Cigarettes     Quit date:      Years since quittin.1   • Smokeless tobacco: Never Used   Substance and Sexual Activity   • Alcohol use: No   • Drug use: No   • Sexual activity: Not Currently           Objective   Physical Exam  Vitals and nursing note reviewed.   Constitutional:       General: She is not in acute distress.     Appearance: She is well-developed. She is obese. She is ill-appearing. She is not toxic-appearing or diaphoretic.   HENT:      Head: Normocephalic.      Right Ear: External ear normal.      Left Ear: External ear normal.   Pulmonary:      Effort: Pulmonary effort is normal. No accessory muscle usage or respiratory distress.      Breath sounds: No wheezing.   Chest:      Chest wall: No tenderness.   Abdominal:      General: Bowel sounds are normal.      Palpations: Abdomen is soft.      Tenderness: There is abdominal tenderness (deep palpation).   Skin:     General: Skin is warm and dry.   Neurological:      Mental Status: She is alert.   Psychiatric:         Behavior: Behavior normal.         Procedures           ED Course      Results for orders placed or performed during the hospital encounter of 22   Comprehensive Metabolic Panel    Specimen: Blood   Result Value Ref Range    Glucose 104 (H) 65 - 99 mg/dL    BUN 17 6 - 20 mg/dL    Creatinine 1.23 (H) 0.57 - 1.00 mg/dL    Sodium 139 136 - 145 mmol/L    Potassium 4.8 3.5 - 5.2 mmol/L    Chloride 101 98 - 107 mmol/L    CO2 24.0 22.0 - 29.0 mmol/L    Calcium 8.2 (L) 8.6 - 10.5 mg/dL    Total Protein 6.8 6.0 - 8.5 g/dL     Albumin 4.40 3.50 - 5.20 g/dL    ALT (SGPT) 18 1 - 33 U/L    AST (SGOT) 20 1 - 32 U/L    Alkaline Phosphatase 105 39 - 117 U/L    Total Bilirubin 0.3 0.0 - 1.2 mg/dL    Globulin 2.4 gm/dL    A/G Ratio 1.8 g/dL    BUN/Creatinine Ratio 13.8 7.0 - 25.0    Anion Gap 14.0 5.0 - 15.0 mmol/L    eGFR 51.0 (L) >60.0 mL/min/1.73   CBC Auto Differential    Specimen: Blood   Result Value Ref Range    WBC 6.67 3.40 - 10.80 10*3/mm3    RBC 4.99 3.77 - 5.28 10*6/mm3    Hemoglobin 15.4 12.0 - 15.9 g/dL    Hematocrit 44.7 34.0 - 46.6 %    MCV 89.6 79.0 - 97.0 fL    MCH 30.9 26.6 - 33.0 pg    MCHC 34.5 31.5 - 35.7 g/dL    RDW 15.5 (H) 12.3 - 15.4 %    RDW-SD 50.9 37.0 - 54.0 fl    MPV 9.2 6.0 - 12.0 fL    Platelets 296 140 - 450 10*3/mm3    Neutrophil % 62.2 42.7 - 76.0 %    Lymphocyte % 22.2 19.6 - 45.3 %    Monocyte % 6.0 5.0 - 12.0 %    Eosinophil % 8.5 (H) 0.3 - 6.2 %    Basophil % 0.7 0.0 - 1.5 %    Immature Grans % 0.4 0.0 - 0.5 %    Neutrophils, Absolute 4.14 1.70 - 7.00 10*3/mm3    Lymphocytes, Absolute 1.48 0.70 - 3.10 10*3/mm3    Monocytes, Absolute 0.40 0.10 - 0.90 10*3/mm3    Eosinophils, Absolute 0.57 (H) 0.00 - 0.40 10*3/mm3    Basophils, Absolute 0.05 0.00 - 0.20 10*3/mm3    Immature Grans, Absolute 0.03 0.00 - 0.05 10*3/mm3    nRBC 0.0 0.0 - 0.2 /100 WBC   Lipase    Specimen: Blood   Result Value Ref Range    Lipase 36 13 - 60 U/L   Urinalysis With Microscopic If Indicated (No Culture) - Urine, Clean Catch    Specimen: Urine, Clean Catch   Result Value Ref Range    Color, UA Yellow Yellow, Straw, Dark Yellow, Kady    Appearance, UA Clear Clear    pH, UA 6.0 5.0 - 9.0    Specific Gravity, UA >1.099 (H) 1.003 - 1.030    Glucose, UA Negative Negative    Ketones, UA 40 mg/dL (2+) (A) Negative    Bilirubin, UA Negative Negative    Blood, UA Negative Negative    Protein, UA Negative Negative    Leuk Esterase, UA Moderate (2+) (A) Negative    Nitrite, UA Negative Negative    Urobilinogen, UA 0.2 E.U./dL 0.2 - 1.0 E.U./dL    Magnesium    Specimen: Blood   Result Value Ref Range    Magnesium 2.3 1.6 - 2.6 mg/dL   Urinalysis, Microscopic Only - Urine, Clean Catch    Specimen: Urine, Clean Catch   Result Value Ref Range    RBC, UA None Seen None Seen /HPF    WBC, UA 6-12 (A) None Seen, 0-2, 3-5 /HPF    Bacteria, UA Trace (A) None Seen /HPF    Squamous Epithelial Cells, UA 3-5 (A) None Seen, 0-2 /HPF    Transitional Epithelial Cells, UA 0-2 0 - 2 /HPF    Hyaline Casts, UA None Seen None Seen /LPF    Methodology Manual Light Microscopy    Gold Top - SST   Result Value Ref Range    Extra Tube Hold for add-ons.    Light Blue Top   Result Value Ref Range    Extra Tube hold for add-on      CT Abdomen Pelvis With Contrast   Final Result   No acute abnormality appreciated.   Nonobstructing interpolar nephrolith left kidney.   Other findings as above. See body of report for full details.      Electronically signed by:  Lv Jefferson MD  3/7/2022 9:37 AM   CST Workstation: [id number]              [facility]  Number of Diagnoses or Management Options  Generalized abdominal pain: new and requires workup  Non-intractable vomiting with nausea, unspecified vomiting type: new and requires workup  Diagnosis management comments: Vital signs are stable, afebrile.  Labs are unremarkable.  No leukocytosis.  CT abdomen pelvis negative for acute findings.  Recommend follow-up with PCP.  Will call in Zofran.  Return precautions provided.      Final diagnoses:   Non-intractable vomiting with nausea, unspecified vomiting type   Generalized abdominal pain       ED Disposition  ED Disposition     ED Disposition   Discharge    Condition   Stable    Comment   --             Diego Mora MD  [facility] [address] [phone]    Schedule an appointment as soon as possible for a visit in 2 days  ER follow up         Medication List      New Prescriptions    * ondansetron ODT 4 MG disintegrating tablet  Commonly known as: ZOFRAN-ODT  Place 1 tablet on  the tongue 4 (Four) Times a Day As Needed for Nausea or Vomiting.     * ondansetron ODT 4 MG disintegrating tablet  Commonly known as: ZOFRAN-ODT  Place 1 tablet on the tongue 4 (Four) Times a Day As Needed for Nausea or Vomiting.         * This list has 2 medication(s) that are the same as other medications prescribed for you. Read the directions carefully, and ask your doctor or other care provider to review them with you.               Where to Get Your Medications      These medications were sent to [facility]/pharmacy #[id number] - [place], [place] - [facility] [facility] [place] [phone] [facility] [phone] [address] [address]    Phone: [phone]   · ondansetron ODT 4 MG disintegrating tablet     You can get these medications from any pharmacy    Bring a paper prescription for each of these medications  · ondansetron ODT 4 MG disintegrating tablet          Isaac Raines MD  03/07/22 [id number]

## 2022-03-07 NOTE — EXTERNAL PATIENT INSTRUCTIONS
Patient Education   Table of Contents       Abdominal Pain, Adult       Nausea and Vomiting, Adult     To view videos and all your education online visit,   [email or web address]   or scan this QR code with your smartphone.                  Abdominal Pain, Adult     Pain in the abdomen (abdominal pain) can be caused by many things. Often, abdominal pain is not serious and it gets better with no treatment or by being treated at home. However, sometimes abdominal pain is serious.   Your health care provider will ask questions about your medical history and do a physical exam to try to determine the cause of your abdominal pain.   Follow these instructions at home:   Medicines         Take over-the-counter and prescription medicines only as told by your health care provider.      Do not  take a laxative unless told by your health care provider.     General instructions            Watch your condition for any changes.       Drink enough fluid to keep your urine pale yellow.       Keep all follow-up visits as told by your health care provider. This is important.       Contact a health care provider if:         Your abdominal pain changes or gets worse.       You are not hungry or you lose weight without trying.       You are constipated or have diarrhea for more than 2?3 days.       You have pain when you urinate or have a bowel movement.       Your abdominal pain wakes you up at night.       Your pain gets worse with meals, after eating, or with certain foods.       You are vomiting and cannot keep anything down.       You have a fever.       You have blood in your urine.     Get help right away if:         Your pain does not go away as soon as your health care provider told you to expect.       You cannot stop vomiting.       Your pain is only in areas of the abdomen, such as the right side or the left lower portion of the abdomen. Pain on the right side could be caused by appendicitis.       You have bloody  or black stools, or stools that look like tar.       You have severe pain, cramping, or bloating in your abdomen.      You have signs of dehydration, such as:       Dark urine, very little urine, or no urine.       Cracked lips.       Dry mouth.       Sunken eyes.       Sleepiness.       Weakness.       You have trouble breathing or chest pain.     Summary         Often, abdominal pain is not serious and it gets better with no treatment or by being treated at home. However, sometimes abdominal pain is serious.       Watch your condition for any changes.       Take over-the-counter and prescription medicines only as told by your health care provider.       Contact a health care provider if your abdominal pain changes or gets worse.       Get help right away if you have severe pain, cramping, or bloating in your abdomen.     This information is not intended to replace advice given to you by your health care provider. Make sure you discuss any questions you have with your health care provider.     Document Released: 09/27/2006Document Revised: 02/05/2021Document Reviewed: 04/27/2020     [employer] Patient Education ? 2021 [employer].         Nausea and Vomiting, Adult     Nausea is feeling sick to your stomach or feeling that you are about to throw up (vomit). Vomiting is when food in your stomach is thrown up and out of the mouth. Throwing up can make you feel weak. It can also make you lose too much water in your body (get dehydrated). If you lose too much water in your body, you may:       Feel tired.       Feel thirsty.       Have a dry mouth.       Have cracked lips.       Go pee (urinate) less often.     Older adults and people with other diseases or a weak body defense system (immune system) are at higher risk for losing too much water in the body. If you feel sick to your stomach and you throw up, it is important to follow instructions from your doctor about how to take care of yourself.   Follow these  instructions at home:   Watch your symptoms for any changes. Tell your doctor about them. Follow these instructions to care for yourself at home.   Eating and drinking               Take an ORS (oral rehydration solution). This is a drink that is sold at pharmacies and stores.      Drink clear fluids in small amounts as you are able, such as:       Water.       Ice chips.       Fruit juice that has water added (diluted fruit juice).       Low-calorie sports drinks.      Eat bland, easy-to-digest foods in small amounts as you are able, such as:       Bananas.       Applesauce.       Rice.       Low-fat (lean) meats.       Toast.       Crackers.       Avoid drinking fluids that have a lot of sugar or caffeine in them. This includes energy drinks, sports drinks, and soda.       Avoid alcohol.       Avoid spicy or fatty foods.       General instructions         Take over-the-counter and prescription medicines only as told by your doctor.       Drink enough fluid to keep your pee (urine) pale yellow.       Wash your hands often with soap and water. If you cannot use soap and water, use hand .       Make sure that all people in your home wash their hands well and often.       Rest at home while you get better.       Watch your condition for any changes.       Take slow and deep breaths when you feel sick to your stomach.       Keep all follow-up visits as told by your doctor. This is important.       Contact a doctor if:         Your symptoms get worse.       You have new symptoms.       You have a fever.       You cannot drink fluids without throwing up.       You feel sick to your stomach for more than 2 days.       You feel light-headed or dizzy.       You have a headache.       You have muscle cramps.       You have a rash.       You have pain while peeing.     Get help right away if:         You have pain in your chest, neck, arm, or jaw.       You feel very weak or you pass out (faint).       You throw up  again and again.       You have throw up that is bright red or looks like black coffee grounds.       You have bloody or black poop (stools) or poop that looks like tar.       You have a very bad headache, a stiff neck, or both.       You have very bad pain, cramping, or bloating in your belly (abdomen).       You have trouble breathing.       You are breathing very quickly.       Your heart is beating very quickly.       Your skin feels cold and clammy.       You feel confused.      You have signs of losing too much water in your body, such as:       Dark pee, very little pee, or no pee.       Cracked lips.       Dry mouth.       Sunken eyes.       Sleepiness.       Weakness.     These symptoms may be an emergency. Do not wait to see if the symptoms will go away. Get medical help right away. Call your local emergency services (911 in the [place] Do not drive yourself to the hospital.   Summary         Nausea is feeling sick to your stomach or feeling that you are about to throw up (vomit). Vomiting is when food in your stomach is thrown up and out of the mouth.       Follow instructions from your doctor about eating and drinking to keep from losing too much water in your body.       Take over-the-counter and prescription medicines only as told by your doctor.       Contact your doctor if your symptoms get worse or you have new symptoms.       Keep all follow-up visits as told by your doctor. This is important.     This information is not intended to replace advice given to you by your health care provider. Make sure you discuss any questions you have with your health care provider.     Document Released: 06/05/2009Document Revised: 04/10/2020Document Reviewed: 05/28/2019     [employer] Patient Education ? 2021 [employer]

## 2022-03-13 LAB
QT INTERVAL: 416 MS
QTC INTERVAL: 491 MS

## 2022-07-30 ENCOUNTER — TELEPHONE ENCOUNTER (OUTPATIENT)
Age: 57
End: 2022-07-30

## 2022-07-31 ENCOUNTER — TELEPHONE ENCOUNTER (OUTPATIENT)
Age: 57
End: 2022-07-31

## 2022-11-11 ENCOUNTER — APPOINTMENT (OUTPATIENT)
Dept: GENERAL RADIOLOGY | Facility: HOSPITAL | Age: 57
End: 2022-11-11

## 2022-11-11 ENCOUNTER — HOSPITAL ENCOUNTER (EMERGENCY)
Facility: HOSPITAL | Age: 57
Discharge: HOME | End: 2022-11-11
Attending: FAMILY MEDICINE | Admitting: FAMILY MEDICINE

## 2022-11-11 ENCOUNTER — APPOINTMENT (OUTPATIENT)
Dept: CT IMAGING | Facility: HOSPITAL | Age: 57
End: 2022-11-11

## 2022-11-11 VITALS
WEIGHT: 260 LBS | BODY MASS INDEX: 46.07 KG/M2 | RESPIRATION RATE: 17 BRPM | DIASTOLIC BLOOD PRESSURE: 100 MMHG | OXYGEN SATURATION: 98 % | SYSTOLIC BLOOD PRESSURE: 153 MMHG | HEART RATE: 86 BPM | TEMPERATURE: 98.5 F | HEIGHT: 63 IN

## 2022-11-11 DIAGNOSIS — R07.9 CHEST PAIN, UNSPECIFIED TYPE: Primary | ICD-10-CM

## 2022-11-11 DIAGNOSIS — R06.02 SHORTNESS OF BREATH: ICD-10-CM

## 2022-11-11 LAB
ALBUMIN SERPL-MCNC: 4.3 G/DL (ref 3.5–5.2)
ALBUMIN/GLOB SERPL: 1.6 G/DL
ALP SERPL-CCNC: 101 U/L (ref 39–117)
ALT SERPL W P-5'-P-CCNC: 44 U/L (ref 1–33)
ANION GAP SERPL CALCULATED.3IONS-SCNC: 16 MMOL/L (ref 5–15)
APTT PPP: 22.3 SECONDS (ref 24.1–35)
AST SERPL-CCNC: 19 U/L (ref 1–32)
BASOPHILS # BLD AUTO: 0.06 10*3/MM3 (ref 0–0.2)
BASOPHILS NFR BLD AUTO: 0.5 % (ref 0–1.5)
BILIRUB SERPL-MCNC: 0.4 MG/DL (ref 0–1.2)
BUN SERPL-MCNC: 25 MG/DL (ref 6–20)
BUN/CREAT SERPL: 23.1 (ref 7–25)
CALCIUM SPEC-SCNC: 9.2 MG/DL (ref 8.6–10.5)
CHLORIDE SERPL-SCNC: 97 MMOL/L (ref 98–107)
CO2 SERPL-SCNC: 23 MMOL/L (ref 22–29)
CREAT SERPL-MCNC: 1.08 MG/DL (ref 0.57–1)
D DIMER PPP FEU-MCNC: 1.95 MCGFEU/ML (ref 0–0.5)
DEPRECATED RDW RBC AUTO: 48.8 FL (ref 37–54)
EGFRCR SERPLBLD CKD-EPI 2021: 59.3 ML/MIN/1.73
EOSINOPHIL # BLD AUTO: 0.11 10*3/MM3 (ref 0–0.4)
EOSINOPHIL NFR BLD AUTO: 1 % (ref 0.3–6.2)
ERYTHROCYTE [DISTWIDTH] IN BLOOD BY AUTOMATED COUNT: 15.5 % (ref 12.3–15.4)
GLOBULIN UR ELPH-MCNC: 2.7 GM/DL
GLUCOSE SERPL-MCNC: 167 MG/DL (ref 65–99)
HCT VFR BLD AUTO: 48.5 % (ref 34–46.6)
HGB BLD-MCNC: 16.1 G/DL (ref 12–15.9)
HOLD SPECIMEN: NORMAL
IMM GRANULOCYTES # BLD AUTO: 0.32 10*3/MM3 (ref 0–0.05)
IMM GRANULOCYTES NFR BLD AUTO: 2.8 % (ref 0–0.5)
INR PPP: 0.99 (ref 0.91–1.09)
LYMPHOCYTES # BLD AUTO: 2.02 10*3/MM3 (ref 0.7–3.1)
LYMPHOCYTES NFR BLD AUTO: 17.9 % (ref 19.6–45.3)
MAGNESIUM SERPL-MCNC: 2.2 MG/DL (ref 1.6–2.6)
MCH RBC QN AUTO: 28.9 PG (ref 26.6–33)
MCHC RBC AUTO-ENTMCNC: 33.2 G/DL (ref 31.5–35.7)
MCV RBC AUTO: 87.1 FL (ref 79–97)
MONOCYTES # BLD AUTO: 0.68 10*3/MM3 (ref 0.1–0.9)
MONOCYTES NFR BLD AUTO: 6 % (ref 5–12)
NEUTROPHILS NFR BLD AUTO: 71.8 % (ref 42.7–76)
NEUTROPHILS NFR BLD AUTO: 8.1 10*3/MM3 (ref 1.7–7)
NRBC BLD AUTO-RTO: 0 /100 WBC (ref 0–0.2)
NT-PROBNP SERPL-MCNC: 57.7 PG/ML (ref 0–900)
PLATELET # BLD AUTO: 216 10*3/MM3 (ref 140–450)
PMV BLD AUTO: 9.6 FL (ref 6–12)
POTASSIUM SERPL-SCNC: 4.6 MMOL/L (ref 3.5–5.2)
PROT SERPL-MCNC: 7 G/DL (ref 6–8.5)
PROTHROMBIN TIME: 12.7 SECONDS (ref 11.9–14.6)
RBC # BLD AUTO: 5.57 10*6/MM3 (ref 3.77–5.28)
SODIUM SERPL-SCNC: 136 MMOL/L (ref 136–145)
TROPONIN T SERPL-MCNC: <0.01 NG/ML (ref 0–0.03)
TROPONIN T SERPL-MCNC: <0.01 NG/ML (ref 0–0.03)
WBC NRBC COR # BLD: 11.29 10*3/MM3 (ref 3.4–10.8)
WHOLE BLOOD HOLD SPECIMEN: NORMAL

## 2022-11-11 PROCEDURE — 99284 EMERGENCY DEPT VISIT MOD MDM: CPT

## 2022-11-11 PROCEDURE — 83735 ASSAY OF MAGNESIUM: CPT | Performed by: FAMILY MEDICINE

## 2022-11-11 PROCEDURE — 84484 ASSAY OF TROPONIN QUANT: CPT | Performed by: FAMILY MEDICINE

## 2022-11-11 PROCEDURE — 85025 COMPLETE CBC W/AUTO DIFF WBC: CPT | Performed by: FAMILY MEDICINE

## 2022-11-11 PROCEDURE — 85730 THROMBOPLASTIN TIME PARTIAL: CPT | Performed by: FAMILY MEDICINE

## 2022-11-11 PROCEDURE — 71045 X-RAY EXAM CHEST 1 VIEW: CPT

## 2022-11-11 PROCEDURE — 80053 COMPREHEN METABOLIC PANEL: CPT | Performed by: FAMILY MEDICINE

## 2022-11-11 PROCEDURE — 71275 CT ANGIOGRAPHY CHEST: CPT

## 2022-11-11 PROCEDURE — 85379 FIBRIN DEGRADATION QUANT: CPT | Performed by: FAMILY MEDICINE

## 2022-11-11 PROCEDURE — 93005 ELECTROCARDIOGRAM TRACING: CPT

## 2022-11-11 PROCEDURE — 93005 ELECTROCARDIOGRAM TRACING: CPT | Performed by: FAMILY MEDICINE

## 2022-11-11 PROCEDURE — 36415 COLL VENOUS BLD VENIPUNCTURE: CPT

## 2022-11-11 PROCEDURE — 85610 PROTHROMBIN TIME: CPT | Performed by: FAMILY MEDICINE

## 2022-11-11 PROCEDURE — 0 IOPAMIDOL PER 1 ML: Performed by: FAMILY MEDICINE

## 2022-11-11 PROCEDURE — 83880 ASSAY OF NATRIURETIC PEPTIDE: CPT | Performed by: FAMILY MEDICINE

## 2022-11-11 PROCEDURE — 93010 ELECTROCARDIOGRAM REPORT: CPT | Performed by: INTERNAL MEDICINE

## 2022-11-11 RX ORDER — SODIUM CHLORIDE 0.9 % (FLUSH) 0.9 %
10 SYRINGE (ML) INJECTION AS NEEDED
Status: DISCONTINUED | OUTPATIENT
Start: 2022-11-11 | End: 2022-11-11 | Stop reason: HOSPADM

## 2022-11-11 RX ADMIN — IOPAMIDOL 82 ML: 755 INJECTION, SOLUTION INTRAVENOUS at 20:10

## 2022-11-12 NOTE — ED PROVIDER NOTES
Subjective   History of Present Illness  Patient reports having chest pain and shortness of breath going on since yesterday.  Patient states that she is having substernal pressure-like chest pain.  Patient states that it is radiating to her back between her shoulder blades.  Patient states that she does not have any cough.  Patient has no fevers or chills.  Patient states that she recently was diagnosed with pneumonia and was admitted to the hospital and discharged on .  Patient states that she has no abdominal pain.  Patient has no nausea vomiting.  Patient has no headache.  Patient states that she does also feel dizzy.  Patient does not state that the room is spinning sensation.  Patient states that she does not feel off balance.  Patient states that she just feels dizzy.        Review of Systems   Respiratory: Positive for shortness of breath.    Cardiovascular: Positive for chest pain.   All other systems reviewed and are negative.      Past Medical History:   Diagnosis Date   • Diabetes mellitus (HCC)    • Disease of thyroid gland     PARATHYROID TUMOR REMOVED   • Hiatal hernia    • Hypertension    • Migraines    • Pneumonia    • PONV (postoperative nausea and vomiting)    • Seizures (HCC) , 2016    X 3 TOTAL   • Stroke (HCC)     APHASIA, SLIGHT WEAKNESS ON RIGHT SIDE (INTENSIFIED WITH FATIGUE)   • SVT (supraventricular tachycardia) ([facility])    • Tachycardia        Allergies   Allergen Reactions   • Latex Anaphylaxis     CALLED TO [facility] IN SURGERY SCHEDULING.   • Sulfa Antibiotics Anaphylaxis       Past Surgical History:   Procedure Laterality Date   • BREAST BIOPSY Right 2017    Procedure: RIGHT BREAST EXCISIONAL BIOPSY;  Surgeon: Radha Moseley MD;  Location: [facility];  Service:    • CARDIAC ABLATION  2017   •  SECTION     • HEMANGIOMA EXCISION      from liver.    • HERNIA REPAIR     • LIVER RESECTION      Giant hemangioma   • PARATHYROID GLAND SURGERY     • PILONIDAL CYSTECTOMY      • TONSILLECTOMY         Family History   Problem Relation Age of Onset   • Breast cancer Maternal Aunt    • Cancer Mother         LIVER   • Diabetes Mother    • Heart disease Mother        Social History     Socioeconomic History   • Marital status:    • Number of children: 2   Tobacco Use   • Smoking status: Former     Packs/day: 0.50     Years: 20.00     Pack years: 10.00     Types: Cigarettes     Quit date:      Years since quittin.8   • Smokeless tobacco: Never   Substance and Sexual Activity   • Alcohol use: No   • Drug use: No   • Sexual activity: Not Currently           Objective   Physical Exam  Vitals and nursing note reviewed.   Constitutional:       General: She is not in acute distress.     Appearance: She is well-developed. She is not toxic-appearing.   HENT:      Head: Normocephalic and atraumatic.   Cardiovascular:      Rate and Rhythm: Normal rate and regular rhythm.      Heart sounds: Normal heart sounds.   Pulmonary:      Effort: Pulmonary effort is normal.      Breath sounds: Normal breath sounds.   Abdominal:      General: Bowel sounds are normal.      Palpations: Abdomen is soft.      Tenderness: There is no abdominal tenderness.   Skin:     General: Skin is warm and dry.   Neurological:      General: No focal deficit present.      Mental Status: She is alert and oriented to person, place, and time.   Psychiatric:         Mood and Affect: Mood normal.         Behavior: Behavior normal.         Procedures           ED Course                                           MDM    Patient has a heart score 3.  Patient reports that she is feeling a little bit better at this time.  Patient is in no respiratory distress.  Patient is not requiring any oxygen.  Patient is in no acute distress.  Patient will be discharged home at this time.  Patient was advised to continue taking her antibiotics that she was prescribed when she was discharged from the hospital on  for the pneumonia.   Patient's chest x-ray appears to be improving.  Patient was advised to follow-up with her primary care provider and return to the emergency room with new or worsening symptoms.  Patient verbalized understanding was agreeable to plan as discussed.      Final diagnoses:   Chest pain, unspecified type   Shortness of breath       ED Disposition  ED Disposition     ED Disposition   Discharge    Condition   Stable    Comment   --             [facility] Emergency Department  [address] [place] [id number] [phone]    As needed, If symptoms worsen         Medication List      No changes were made to your prescriptions during this visit.         Vince Porras MD  11/11/22 [id number]

## 2022-11-13 LAB
QT INTERVAL: 350 MS
QTC INTERVAL: 473 MS

## 2023-01-23 ENCOUNTER — TELEPHONE (OUTPATIENT)
Dept: CARDIOLOGY | Facility: CLINIC | Age: 58
End: 2023-01-23
Payer: COMMERCIAL

## 2023-01-24 DIAGNOSIS — I49.5 SICK SINUS SYNDROME: Primary | ICD-10-CM

## 2023-01-24 RX ORDER — SODIUM CHLORIDE 0.9 % (FLUSH) 0.9 %
3 SYRINGE (ML) INJECTION EVERY 12 HOURS SCHEDULED
Status: CANCELLED | OUTPATIENT
Start: 2023-01-24

## 2023-01-24 RX ORDER — LIDOCAINE HYDROCHLORIDE 10 MG/ML
0.1 INJECTION, SOLUTION EPIDURAL; INFILTRATION; INTRACAUDAL; PERINEURAL ONCE AS NEEDED
Status: CANCELLED | OUTPATIENT
Start: 2023-01-24

## 2023-01-24 RX ORDER — ACETAMINOPHEN 325 MG/1
650 TABLET ORAL EVERY 4 HOURS PRN
Status: CANCELLED | OUTPATIENT
Start: 2023-01-24

## 2023-01-24 RX ORDER — ONDANSETRON 2 MG/ML
4 INJECTION INTRAMUSCULAR; INTRAVENOUS EVERY 6 HOURS PRN
Status: CANCELLED | OUTPATIENT
Start: 2023-01-24

## 2023-01-24 RX ORDER — SODIUM CHLORIDE 0.9 % (FLUSH) 0.9 %
3-10 SYRINGE (ML) INJECTION AS NEEDED
Status: CANCELLED | OUTPATIENT
Start: 2023-01-24

## 2023-01-25 PROBLEM — I49.5 SICK SINUS SYNDROME (HCC): Status: ACTIVE | Noted: 2023-01-25

## 2023-01-27 ENCOUNTER — HOSPITAL ENCOUNTER (OUTPATIENT)
Facility: HOSPITAL | Age: 58
Setting detail: HOSPITAL OUTPATIENT SURGERY
Discharge: HOME | End: 2023-01-27
Attending: INTERNAL MEDICINE | Admitting: INTERNAL MEDICINE
Payer: COMMERCIAL

## 2023-01-27 VITALS
SYSTOLIC BLOOD PRESSURE: 127 MMHG | DIASTOLIC BLOOD PRESSURE: 74 MMHG | HEIGHT: 63 IN | WEIGHT: 258.8 LBS | TEMPERATURE: 99 F | BODY MASS INDEX: 45.86 KG/M2 | RESPIRATION RATE: 21 BRPM | OXYGEN SATURATION: 97 % | HEART RATE: 97 BPM

## 2023-01-27 DIAGNOSIS — I49.5 SICK SINUS SYNDROME: Primary | ICD-10-CM

## 2023-01-27 DIAGNOSIS — I49.5 SICK SINUS SYNDROME: ICD-10-CM

## 2023-01-27 LAB
ALBUMIN SERPL-MCNC: 4.3 G/DL (ref 3.5–5.2)
ALBUMIN/GLOB SERPL: 1.4 G/DL
ALP SERPL-CCNC: 140 U/L (ref 39–117)
ALT SERPL W P-5'-P-CCNC: 27 U/L (ref 1–33)
ANION GAP SERPL CALCULATED.3IONS-SCNC: 10 MMOL/L (ref 5–15)
AST SERPL-CCNC: 21 U/L (ref 1–32)
BASOPHILS # BLD AUTO: 0.07 10*3/MM3 (ref 0–0.2)
BASOPHILS NFR BLD AUTO: 0.8 % (ref 0–1.5)
BILIRUB SERPL-MCNC: 0.3 MG/DL (ref 0–1.2)
BUN SERPL-MCNC: 20 MG/DL (ref 6–20)
BUN/CREAT SERPL: 20.6 (ref 7–25)
CALCIUM SPEC-SCNC: 9.2 MG/DL (ref 8.6–10.5)
CHLORIDE SERPL-SCNC: 102 MMOL/L (ref 98–107)
CO2 SERPL-SCNC: 27 MMOL/L (ref 22–29)
CREAT SERPL-MCNC: 0.97 MG/DL (ref 0.57–1)
DEPRECATED RDW RBC AUTO: 50.8 FL (ref 37–54)
EGFRCR SERPLBLD CKD-EPI 2021: 67.5 ML/MIN/1.73
EOSINOPHIL # BLD AUTO: 0.2 10*3/MM3 (ref 0–0.4)
EOSINOPHIL NFR BLD AUTO: 2.3 % (ref 0.3–6.2)
ERYTHROCYTE [DISTWIDTH] IN BLOOD BY AUTOMATED COUNT: 14.6 % (ref 12.3–15.4)
GLOBULIN UR ELPH-MCNC: 3.1 GM/DL
GLUCOSE SERPL-MCNC: 131 MG/DL (ref 65–99)
HCT VFR BLD AUTO: 49.1 % (ref 34–46.6)
HGB BLD-MCNC: 15.3 G/DL (ref 12–15.9)
IMM GRANULOCYTES # BLD AUTO: 0.04 10*3/MM3 (ref 0–0.05)
IMM GRANULOCYTES NFR BLD AUTO: 0.5 % (ref 0–0.5)
INR PPP: 0.96 (ref 0.91–1.09)
LYMPHOCYTES # BLD AUTO: 1.49 10*3/MM3 (ref 0.7–3.1)
LYMPHOCYTES NFR BLD AUTO: 16.8 % (ref 19.6–45.3)
MCH RBC QN AUTO: 29.5 PG (ref 26.6–33)
MCHC RBC AUTO-ENTMCNC: 31.2 G/DL (ref 31.5–35.7)
MCV RBC AUTO: 94.6 FL (ref 79–97)
MONOCYTES # BLD AUTO: 0.54 10*3/MM3 (ref 0.1–0.9)
MONOCYTES NFR BLD AUTO: 6.1 % (ref 5–12)
NEUTROPHILS NFR BLD AUTO: 6.53 10*3/MM3 (ref 1.7–7)
NEUTROPHILS NFR BLD AUTO: 73.5 % (ref 42.7–76)
NRBC BLD AUTO-RTO: 0 /100 WBC (ref 0–0.2)
PLATELET # BLD AUTO: 401 10*3/MM3 (ref 140–450)
PMV BLD AUTO: 8.8 FL (ref 6–12)
POTASSIUM SERPL-SCNC: 4.4 MMOL/L (ref 3.5–5.2)
PROT SERPL-MCNC: 7.4 G/DL (ref 6–8.5)
PROTHROMBIN TIME: 12.8 SECONDS (ref 11.8–14.8)
RBC # BLD AUTO: 5.19 10*6/MM3 (ref 3.77–5.28)
SODIUM SERPL-SCNC: 139 MMOL/L (ref 136–145)
WBC NRBC COR # BLD: 8.87 10*3/MM3 (ref 3.4–10.8)

## 2023-01-27 PROCEDURE — S0260 H&P FOR SURGERY: HCPCS | Performed by: INTERNAL MEDICINE

## 2023-01-27 PROCEDURE — 25010000002 FENTANYL CITRATE (PF) 50 MCG/ML SOLUTION: Performed by: INTERNAL MEDICINE

## 2023-01-27 PROCEDURE — 33208 INSRT HEART PM ATRIAL & VENT: CPT | Performed by: INTERNAL MEDICINE

## 2023-01-27 PROCEDURE — 85610 PROTHROMBIN TIME: CPT | Performed by: INTERNAL MEDICINE

## 2023-01-27 PROCEDURE — 25010000002 MIDAZOLAM PER 1 MG: Performed by: INTERNAL MEDICINE

## 2023-01-27 PROCEDURE — 25010000002 CEFAZOLIN PER 500 MG: Performed by: INTERNAL MEDICINE

## 2023-01-27 PROCEDURE — 85025 COMPLETE CBC W/AUTO DIFF WBC: CPT | Performed by: INTERNAL MEDICINE

## 2023-01-27 PROCEDURE — 80053 COMPREHEN METABOLIC PANEL: CPT | Performed by: INTERNAL MEDICINE

## 2023-01-27 RX ORDER — BUPIVACAINE HCL/0.9 % NACL/PF 0.1 %
2 PLASTIC BAG, INJECTION (ML) EPIDURAL ONCE
Status: DISCONTINUED | OUTPATIENT
Start: 2023-01-27 | End: 2023-01-27 | Stop reason: HOSPADM

## 2023-01-27 RX ORDER — ATORVASTATIN CALCIUM 10 MG/1
10 TABLET, FILM COATED ORAL NIGHTLY
COMMUNITY

## 2023-01-27 RX ORDER — SODIUM CHLORIDE 0.9 % (FLUSH) 0.9 %
3 SYRINGE (ML) INJECTION EVERY 12 HOURS SCHEDULED
Status: DISCONTINUED | OUTPATIENT
Start: 2023-01-27 | End: 2023-01-27 | Stop reason: HOSPADM

## 2023-01-27 RX ORDER — LIDOCAINE HYDROCHLORIDE 10 MG/ML
0.1 INJECTION, SOLUTION EPIDURAL; INFILTRATION; INTRACAUDAL; PERINEURAL ONCE AS NEEDED
Status: DISCONTINUED | OUTPATIENT
Start: 2023-01-27 | End: 2023-01-27 | Stop reason: HOSPADM

## 2023-01-27 RX ORDER — ACETAMINOPHEN 325 MG/1
650 TABLET ORAL EVERY 4 HOURS PRN
Status: DISCONTINUED | OUTPATIENT
Start: 2023-01-27 | End: 2023-01-27 | Stop reason: HOSPADM

## 2023-01-27 RX ORDER — LOSARTAN POTASSIUM 25 MG/1
25 TABLET ORAL DAILY
COMMUNITY

## 2023-01-27 RX ORDER — SODIUM CHLORIDE 0.9 % (FLUSH) 0.9 %
3-10 SYRINGE (ML) INJECTION AS NEEDED
Status: DISCONTINUED | OUTPATIENT
Start: 2023-01-27 | End: 2023-01-27 | Stop reason: HOSPADM

## 2023-01-27 RX ORDER — ONDANSETRON 2 MG/ML
4 INJECTION INTRAMUSCULAR; INTRAVENOUS EVERY 6 HOURS PRN
Status: DISCONTINUED | OUTPATIENT
Start: 2023-01-27 | End: 2023-01-27 | Stop reason: HOSPADM

## 2023-01-27 RX ORDER — MIDAZOLAM HYDROCHLORIDE 1 MG/ML
INJECTION INTRAMUSCULAR; INTRAVENOUS
Status: DISCONTINUED | OUTPATIENT
Start: 2023-01-27 | End: 2023-01-27 | Stop reason: HOSPADM

## 2023-01-27 RX ORDER — FENTANYL CITRATE 50 UG/ML
INJECTION, SOLUTION INTRAMUSCULAR; INTRAVENOUS
Status: DISCONTINUED | OUTPATIENT
Start: 2023-01-27 | End: 2023-01-27 | Stop reason: HOSPADM

## 2023-01-30 ENCOUNTER — APPOINTMENT (OUTPATIENT)
Dept: CT IMAGING | Facility: HOSPITAL | Age: 58
End: 2023-01-30
Payer: COMMERCIAL

## 2023-01-30 ENCOUNTER — APPOINTMENT (OUTPATIENT)
Dept: GENERAL RADIOLOGY | Facility: HOSPITAL | Age: 58
End: 2023-01-30
Payer: COMMERCIAL

## 2023-01-30 ENCOUNTER — HOSPITAL ENCOUNTER (OUTPATIENT)
Facility: HOSPITAL | Age: 58
Discharge: HOME | End: 2023-02-01
Attending: INTERNAL MEDICINE | Admitting: INTERNAL MEDICINE
Payer: COMMERCIAL

## 2023-01-30 ENCOUNTER — ANESTHESIA EVENT (OUTPATIENT)
Dept: CARDIOLOGY | Facility: HOSPITAL | Age: 58
End: 2023-01-30
Payer: COMMERCIAL

## 2023-01-30 ENCOUNTER — APPOINTMENT (OUTPATIENT)
Dept: CARDIOLOGY | Facility: HOSPITAL | Age: 58
End: 2023-01-30
Payer: COMMERCIAL

## 2023-01-30 ENCOUNTER — ANESTHESIA (OUTPATIENT)
Dept: CARDIOLOGY | Facility: HOSPITAL | Age: 58
End: 2023-01-30
Payer: COMMERCIAL

## 2023-01-30 DIAGNOSIS — J90 PLEURAL EFFUSION: Primary | ICD-10-CM

## 2023-01-30 DIAGNOSIS — I49.5 SICK SINUS SYNDROME: ICD-10-CM

## 2023-01-30 LAB
ANION GAP SERPL CALCULATED.3IONS-SCNC: 16 MMOL/L (ref 5–15)
BUN SERPL-MCNC: 15 MG/DL (ref 6–20)
BUN/CREAT SERPL: 16.1 (ref 7–25)
CALCIUM SPEC-SCNC: 8.7 MG/DL (ref 8.6–10.5)
CHLORIDE SERPL-SCNC: 103 MMOL/L (ref 98–107)
CO2 SERPL-SCNC: 21 MMOL/L (ref 22–29)
CREAT SERPL-MCNC: 0.93 MG/DL (ref 0.57–1)
DEPRECATED RDW RBC AUTO: 48.5 FL (ref 37–54)
EGFRCR SERPLBLD CKD-EPI 2021: 70.9 ML/MIN/1.73
ERYTHROCYTE [DISTWIDTH] IN BLOOD BY AUTOMATED COUNT: 14.5 % (ref 12.3–15.4)
GLUCOSE SERPL-MCNC: 137 MG/DL (ref 65–99)
HCT VFR BLD AUTO: 43.1 % (ref 34–46.6)
HGB BLD-MCNC: 13.9 G/DL (ref 12–15.9)
MCH RBC QN AUTO: 29.5 PG (ref 26.6–33)
MCHC RBC AUTO-ENTMCNC: 32.3 G/DL (ref 31.5–35.7)
MCV RBC AUTO: 91.5 FL (ref 79–97)
PLATELET # BLD AUTO: 338 10*3/MM3 (ref 140–450)
PMV BLD AUTO: 8.9 FL (ref 6–12)
POTASSIUM SERPL-SCNC: 4.8 MMOL/L (ref 3.5–5.2)
RBC # BLD AUTO: 4.71 10*6/MM3 (ref 3.77–5.28)
SODIUM SERPL-SCNC: 140 MMOL/L (ref 136–145)
WBC NRBC COR # BLD: 12.46 10*3/MM3 (ref 3.4–10.8)

## 2023-01-30 PROCEDURE — C1898 LEAD, PMKR, OTHER THAN TRANS: HCPCS | Performed by: INTERNAL MEDICINE

## 2023-01-30 PROCEDURE — 63710000001 ONDANSETRON ODT 4 MG TABLET DISPERSIBLE: Performed by: INTERNAL MEDICINE

## 2023-01-30 PROCEDURE — 80048 BASIC METABOLIC PNL TOTAL CA: CPT | Performed by: INTERNAL MEDICINE

## 2023-01-30 PROCEDURE — G0378 HOSPITAL OBSERVATION PER HR: HCPCS

## 2023-01-30 PROCEDURE — 33208 INSRT HEART PM ATRIAL & VENT: CPT | Performed by: INTERNAL MEDICINE

## 2023-01-30 PROCEDURE — 71250 CT THORAX DX C-: CPT

## 2023-01-30 PROCEDURE — C1892 INTRO/SHEATH,FIXED,PEEL-AWAY: HCPCS | Performed by: INTERNAL MEDICINE

## 2023-01-30 PROCEDURE — 71045 X-RAY EXAM CHEST 1 VIEW: CPT

## 2023-01-30 PROCEDURE — C1769 GUIDE WIRE: HCPCS | Performed by: INTERNAL MEDICINE

## 2023-01-30 PROCEDURE — 25010000002 FENTANYL CITRATE (PF) 100 MCG/2ML SOLUTION: Performed by: NURSE ANESTHETIST, CERTIFIED REGISTERED

## 2023-01-30 PROCEDURE — 25010000002 PROPOFOL 1000 MG/100ML EMULSION: Performed by: NURSE ANESTHETIST, CERTIFIED REGISTERED

## 2023-01-30 PROCEDURE — 85027 COMPLETE CBC AUTOMATED: CPT | Performed by: INTERNAL MEDICINE

## 2023-01-30 PROCEDURE — 25010000002 MORPHINE PER 10 MG: Performed by: INTERNAL MEDICINE

## 2023-01-30 PROCEDURE — 25010000002 CEFAZOLIN PER 500 MG: Performed by: INTERNAL MEDICINE

## 2023-01-30 PROCEDURE — 0 IOPAMIDOL PER 1 ML: Performed by: INTERNAL MEDICINE

## 2023-01-30 PROCEDURE — 25010000002 MORPHINE SULFATE (PF) 2 MG/ML SOLUTION: Performed by: INTERNAL MEDICINE

## 2023-01-30 PROCEDURE — C1785 PMKR, DUAL, RATE-RESP: HCPCS | Performed by: INTERNAL MEDICINE

## 2023-01-30 PROCEDURE — 25010000002 ONDANSETRON PER 1 MG

## 2023-01-30 DEVICE — GEN PM AZURE XT SURESCAN DR MRI: Type: IMPLANTABLE DEVICE | Status: FUNCTIONAL

## 2023-01-30 DEVICE — LD PM CAPSUREFIX NOVUS5076 52CM: Type: IMPLANTABLE DEVICE | Status: FUNCTIONAL

## 2023-01-30 RX ORDER — LIDOCAINE HYDROCHLORIDE 20 MG/ML
INJECTION, SOLUTION INFILTRATION; PERINEURAL
Status: DISCONTINUED | OUTPATIENT
Start: 2023-01-30 | End: 2023-01-30 | Stop reason: HOSPADM

## 2023-01-30 RX ORDER — SUCRALFATE 1 G/1
1 TABLET ORAL 4 TIMES DAILY
Status: DISCONTINUED | OUTPATIENT
Start: 2023-01-30 | End: 2023-01-31

## 2023-01-30 RX ORDER — LIDOCAINE HYDROCHLORIDE 20 MG/ML
INJECTION, SOLUTION EPIDURAL; INFILTRATION; INTRACAUDAL; PERINEURAL AS NEEDED
Status: DISCONTINUED | OUTPATIENT
Start: 2023-01-30 | End: 2023-01-30 | Stop reason: SURG

## 2023-01-30 RX ORDER — MONTELUKAST SODIUM 10 MG/1
10 TABLET ORAL NIGHTLY
Status: DISCONTINUED | OUTPATIENT
Start: 2023-01-30 | End: 2023-02-01 | Stop reason: HOSPADM

## 2023-01-30 RX ORDER — MORPHINE SULFATE 2 MG/ML
2 INJECTION, SOLUTION INTRAMUSCULAR; INTRAVENOUS ONCE
Status: COMPLETED | OUTPATIENT
Start: 2023-01-30 | End: 2023-01-30

## 2023-01-30 RX ORDER — LOSARTAN POTASSIUM 50 MG/1
25 TABLET ORAL DAILY
Status: DISCONTINUED | OUTPATIENT
Start: 2023-01-30 | End: 2023-02-01 | Stop reason: HOSPADM

## 2023-01-30 RX ORDER — SODIUM CHLORIDE 9 MG/ML
60 INJECTION, SOLUTION INTRAVENOUS CONTINUOUS
Status: DISCONTINUED | OUTPATIENT
Start: 2023-01-30 | End: 2023-02-01 | Stop reason: HOSPADM

## 2023-01-30 RX ORDER — FLUOXETINE HYDROCHLORIDE 20 MG/1
20 CAPSULE ORAL DAILY
Status: DISCONTINUED | OUTPATIENT
Start: 2023-01-30 | End: 2023-02-01 | Stop reason: HOSPADM

## 2023-01-30 RX ORDER — ONDANSETRON 4 MG/1
4 TABLET, ORALLY DISINTEGRATING ORAL 4 TIMES DAILY PRN
Status: DISCONTINUED | OUTPATIENT
Start: 2023-01-30 | End: 2023-02-01 | Stop reason: HOSPADM

## 2023-01-30 RX ORDER — METOPROLOL SUCCINATE 100 MG/1
100 TABLET, EXTENDED RELEASE ORAL DAILY
Status: DISCONTINUED | OUTPATIENT
Start: 2023-01-30 | End: 2023-02-01 | Stop reason: HOSPADM

## 2023-01-30 RX ORDER — PANTOPRAZOLE SODIUM 40 MG/1
40 TABLET, DELAYED RELEASE ORAL
Status: DISCONTINUED | OUTPATIENT
Start: 2023-01-31 | End: 2023-02-01 | Stop reason: HOSPADM

## 2023-01-30 RX ORDER — BUPIVACAINE HCL/0.9 % NACL/PF 0.1 %
2 PLASTIC BAG, INJECTION (ML) EPIDURAL ONCE
Status: COMPLETED | OUTPATIENT
Start: 2023-01-30 | End: 2023-01-30

## 2023-01-30 RX ORDER — ONDANSETRON 4 MG/1
4 TABLET, FILM COATED ORAL EVERY 8 HOURS PRN
Status: DISCONTINUED | OUTPATIENT
Start: 2023-01-30 | End: 2023-01-30 | Stop reason: SDUPTHER

## 2023-01-30 RX ORDER — SODIUM CHLORIDE 0.9 % (FLUSH) 0.9 %
3 SYRINGE (ML) INJECTION EVERY 12 HOURS SCHEDULED
Status: DISCONTINUED | OUTPATIENT
Start: 2023-01-30 | End: 2023-02-01 | Stop reason: SDUPTHER

## 2023-01-30 RX ORDER — OXYCODONE HYDROCHLORIDE AND ACETAMINOPHEN 5; 325 MG/1; MG/1
1 TABLET ORAL EVERY 4 HOURS PRN
Status: DISCONTINUED | OUTPATIENT
Start: 2023-01-30 | End: 2023-02-01 | Stop reason: HOSPADM

## 2023-01-30 RX ORDER — METOCLOPRAMIDE 5 MG/1
5 TABLET ORAL
Status: DISCONTINUED | OUTPATIENT
Start: 2023-01-30 | End: 2023-02-01 | Stop reason: HOSPADM

## 2023-01-30 RX ORDER — SODIUM CHLORIDE 0.9 % (FLUSH) 0.9 %
10 SYRINGE (ML) INJECTION AS NEEDED
Status: DISCONTINUED | OUTPATIENT
Start: 2023-01-30 | End: 2023-02-01 | Stop reason: HOSPADM

## 2023-01-30 RX ORDER — ONDANSETRON 2 MG/ML
INJECTION INTRAMUSCULAR; INTRAVENOUS
Status: COMPLETED
Start: 2023-01-30 | End: 2023-01-30

## 2023-01-30 RX ORDER — ATORVASTATIN CALCIUM 10 MG/1
10 TABLET, FILM COATED ORAL NIGHTLY
Status: DISCONTINUED | OUTPATIENT
Start: 2023-01-30 | End: 2023-02-01 | Stop reason: HOSPADM

## 2023-01-30 RX ORDER — SODIUM CHLORIDE 9 MG/ML
50 INJECTION, SOLUTION INTRAVENOUS CONTINUOUS
Status: DISCONTINUED | OUTPATIENT
Start: 2023-01-30 | End: 2023-01-30

## 2023-01-30 RX ORDER — LEVOTHYROXINE SODIUM 0.15 MG/1
150 TABLET ORAL
Status: DISCONTINUED | OUTPATIENT
Start: 2023-01-31 | End: 2023-02-01 | Stop reason: HOSPADM

## 2023-01-30 RX ORDER — PROPOFOL 10 MG/ML
INJECTION, EMULSION INTRAVENOUS AS NEEDED
Status: DISCONTINUED | OUTPATIENT
Start: 2023-01-30 | End: 2023-01-30 | Stop reason: SURG

## 2023-01-30 RX ORDER — SODIUM CHLORIDE 9 MG/ML
40 INJECTION, SOLUTION INTRAVENOUS AS NEEDED
Status: DISCONTINUED | OUTPATIENT
Start: 2023-01-30 | End: 2023-02-01 | Stop reason: HOSPADM

## 2023-01-30 RX ORDER — TRIAMCINOLONE ACETONIDE 1 MG/G
CREAM TOPICAL EVERY 8 HOURS SCHEDULED
Status: DISCONTINUED | OUTPATIENT
Start: 2023-01-30 | End: 2023-02-01 | Stop reason: HOSPADM

## 2023-01-30 RX ORDER — ONDANSETRON 4 MG/1
4 TABLET, ORALLY DISINTEGRATING ORAL EVERY 8 HOURS PRN
Status: DISCONTINUED | OUTPATIENT
Start: 2023-01-30 | End: 2023-01-30 | Stop reason: SDUPTHER

## 2023-01-30 RX ORDER — SODIUM CHLORIDE 0.9 % (FLUSH) 0.9 %
3-10 SYRINGE (ML) INJECTION AS NEEDED
Status: DISCONTINUED | OUTPATIENT
Start: 2023-01-30 | End: 2023-02-01 | Stop reason: HOSPADM

## 2023-01-30 RX ORDER — FENTANYL CITRATE 50 UG/ML
INJECTION, SOLUTION INTRAMUSCULAR; INTRAVENOUS AS NEEDED
Status: DISCONTINUED | OUTPATIENT
Start: 2023-01-30 | End: 2023-01-30 | Stop reason: SURG

## 2023-01-30 RX ORDER — BUPIVACAINE HCL/0.9 % NACL/PF 0.125 %
PLASTIC BAG, INJECTION (ML) EPIDURAL AS NEEDED
Status: DISCONTINUED | OUTPATIENT
Start: 2023-01-30 | End: 2023-01-30 | Stop reason: SURG

## 2023-01-30 RX ORDER — MONTELUKAST SODIUM 10 MG/1
10 TABLET ORAL NIGHTLY
COMMUNITY

## 2023-01-30 RX ORDER — SODIUM CHLORIDE 0.9 % (FLUSH) 0.9 %
10 SYRINGE (ML) INJECTION EVERY 12 HOURS SCHEDULED
Status: DISCONTINUED | OUTPATIENT
Start: 2023-01-30 | End: 2023-02-01 | Stop reason: HOSPADM

## 2023-01-30 RX ORDER — MORPHINE SULFATE 10 MG/ML
2 INJECTION INTRAMUSCULAR; INTRAVENOUS; SUBCUTANEOUS EVERY 4 HOURS PRN
Status: DISCONTINUED | OUTPATIENT
Start: 2023-01-30 | End: 2023-02-01 | Stop reason: HOSPADM

## 2023-01-30 RX ADMIN — FLUOXETINE HYDROCHLORIDE 20 MG: 20 CAPSULE ORAL at 19:32

## 2023-01-30 RX ADMIN — Medication 10 ML: at 12:59

## 2023-01-30 RX ADMIN — METOPROLOL SUCCINATE 100 MG: 100 TABLET, FILM COATED, EXTENDED RELEASE ORAL at 19:32

## 2023-01-30 RX ADMIN — SODIUM CHLORIDE 50 ML/HR: 9 INJECTION, SOLUTION INTRAVENOUS at 13:33

## 2023-01-30 RX ADMIN — PROPOFOL 50 MG: 10 INJECTION, EMULSION INTRAVENOUS at 14:47

## 2023-01-30 RX ADMIN — LIDOCAINE HYDROCHLORIDE 100 MG: 20 INJECTION, SOLUTION EPIDURAL; INFILTRATION; INTRACAUDAL; PERINEURAL at 14:47

## 2023-01-30 RX ADMIN — PROPOFOL 100 MCG/KG/MIN: 10 INJECTION, EMULSION INTRAVENOUS at 14:48

## 2023-01-30 RX ADMIN — ONDANSETRON 4 MG: 4 TABLET, ORALLY DISINTEGRATING ORAL at 21:24

## 2023-01-30 RX ADMIN — MORPHINE SULFATE 2 MG: 2 INJECTION, SOLUTION INTRAMUSCULAR; INTRAVENOUS at 19:42

## 2023-01-30 RX ADMIN — Medication 100 MCG: at 15:03

## 2023-01-30 RX ADMIN — FENTANYL CITRATE 50 MCG: 50 INJECTION, SOLUTION INTRAMUSCULAR; INTRAVENOUS at 14:50

## 2023-01-30 RX ADMIN — METOCLOPRAMIDE 5 MG: 5 TABLET ORAL at 20:10

## 2023-01-30 RX ADMIN — Medication 2 G: at 14:58

## 2023-01-30 RX ADMIN — SODIUM CHLORIDE: 9 INJECTION, SOLUTION INTRAVENOUS at 16:36

## 2023-01-30 RX ADMIN — FENTANYL CITRATE 50 MCG: 50 INJECTION, SOLUTION INTRAMUSCULAR; INTRAVENOUS at 14:55

## 2023-01-30 RX ADMIN — ATORVASTATIN CALCIUM 10 MG: 10 TABLET, FILM COATED ORAL at 20:10

## 2023-01-30 RX ADMIN — SUCRALFATE 1 G: 1 TABLET ORAL at 20:11

## 2023-01-30 RX ADMIN — MORPHINE SULFATE 2 MG: 10 INJECTION, SOLUTION INTRAMUSCULAR; INTRAVENOUS at 17:35

## 2023-01-30 RX ADMIN — Medication 200 MCG: at 15:14

## 2023-01-30 RX ADMIN — ONDANSETRON 4 MG: 2 INJECTION INTRAMUSCULAR; INTRAVENOUS at 17:42

## 2023-01-30 RX ADMIN — MONTELUKAST SODIUM 10 MG: 10 TABLET, FILM COATED ORAL at 20:10

## 2023-01-30 RX ADMIN — Medication 10 ML: at 20:11

## 2023-01-30 RX ADMIN — Medication 3 ML: at 20:11

## 2023-01-30 RX ADMIN — LOSARTAN POTASSIUM 25 MG: 50 TABLET, FILM COATED ORAL at 19:32

## 2023-01-30 RX ADMIN — OXYCODONE HYDROCHLORIDE AND ACETAMINOPHEN 1 TABLET: 5; 325 TABLET ORAL at 20:10

## 2023-01-30 RX ADMIN — Medication 100 MCG: at 14:59

## 2023-01-30 NOTE — ANESTHESIA PREPROCEDURE EVALUATION
Anesthesia Evaluation     Patient summary reviewed   history of anesthetic complications: PONV               Airway   Mallampati: II  TM distance: >3 FB  Neck ROM: full  Dental - normal exam     Pulmonary    (+) a smoker Former Abstained day of surgery, asthma,sleep apnea,   Cardiovascular   Exercise tolerance: poor (<4 METS)    Patient on routine beta blocker and Beta blocker given within 24 hours of surgery    (+) hypertension, dysrhythmias (Sick sinus) Tachycardia, hyperlipidemia,       Neuro/Psych  (+) CVA,    Seizures: pt denies seizures. unsure how that came on her chart.  GI/Hepatic/Renal/Endo    (+) morbid obesity, hiatal hernia, GERD,  diabetes mellitus, thyroid problem   (-) liver disease, no renal disease    Musculoskeletal     Abdominal    Substance History      OB/GYN          Other      history of cancer remission                  Anesthesia Plan    ASA 3     MAC   total IV anesthesia    Anesthetic plan, risks, benefits, and alternatives have been provided, discussed and informed consent has been obtained with: patient.    Plan discussed with CRNA.        CODE STATUS:

## 2023-01-31 ENCOUNTER — APPOINTMENT (OUTPATIENT)
Dept: CARDIOLOGY | Facility: HOSPITAL | Age: 58
End: 2023-01-31
Payer: COMMERCIAL

## 2023-01-31 LAB
ALBUMIN SERPL-MCNC: 3.7 G/DL (ref 3.5–5.2)
ALBUMIN/GLOB SERPL: 1.5 G/DL
ALP SERPL-CCNC: 110 U/L (ref 39–117)
ALT SERPL W P-5'-P-CCNC: 42 U/L (ref 1–33)
ANION GAP SERPL CALCULATED.3IONS-SCNC: 13 MMOL/L (ref 5–15)
AST SERPL-CCNC: 49 U/L (ref 1–32)
BASOPHILS # BLD AUTO: 0.03 10*3/MM3 (ref 0–0.2)
BASOPHILS NFR BLD AUTO: 0.4 % (ref 0–1.5)
BILIRUB SERPL-MCNC: 0.3 MG/DL (ref 0–1.2)
BUN SERPL-MCNC: 16 MG/DL (ref 6–20)
BUN/CREAT SERPL: 20.5 (ref 7–25)
CALCIUM SPEC-SCNC: 8 MG/DL (ref 8.6–10.5)
CHLORIDE SERPL-SCNC: 101 MMOL/L (ref 98–107)
CO2 SERPL-SCNC: 24 MMOL/L (ref 22–29)
CREAT SERPL-MCNC: 0.78 MG/DL (ref 0.57–1)
DEPRECATED RDW RBC AUTO: 49.5 FL (ref 37–54)
EGFRCR SERPLBLD CKD-EPI 2021: 87.6 ML/MIN/1.73
EOSINOPHIL # BLD AUTO: 0.07 10*3/MM3 (ref 0–0.4)
EOSINOPHIL NFR BLD AUTO: 0.9 % (ref 0.3–6.2)
ERYTHROCYTE [DISTWIDTH] IN BLOOD BY AUTOMATED COUNT: 14.4 % (ref 12.3–15.4)
GLOBULIN UR ELPH-MCNC: 2.4 GM/DL
GLUCOSE BLDC GLUCOMTR-MCNC: 109 MG/DL (ref 70–130)
GLUCOSE SERPL-MCNC: 125 MG/DL (ref 65–99)
HCT VFR BLD AUTO: 39.1 % (ref 34–46.6)
HGB BLD-MCNC: 12.4 G/DL (ref 12–15.9)
IMM GRANULOCYTES # BLD AUTO: 0.04 10*3/MM3 (ref 0–0.05)
IMM GRANULOCYTES NFR BLD AUTO: 0.5 % (ref 0–0.5)
INR PPP: 1.04 (ref 0.91–1.09)
LYMPHOCYTES # BLD AUTO: 1.18 10*3/MM3 (ref 0.7–3.1)
LYMPHOCYTES NFR BLD AUTO: 14.4 % (ref 19.6–45.3)
MCH RBC QN AUTO: 29.5 PG (ref 26.6–33)
MCHC RBC AUTO-ENTMCNC: 31.7 G/DL (ref 31.5–35.7)
MCV RBC AUTO: 93.1 FL (ref 79–97)
MONOCYTES # BLD AUTO: 0.57 10*3/MM3 (ref 0.1–0.9)
MONOCYTES NFR BLD AUTO: 7 % (ref 5–12)
NEUTROPHILS NFR BLD AUTO: 6.28 10*3/MM3 (ref 1.7–7)
NEUTROPHILS NFR BLD AUTO: 76.8 % (ref 42.7–76)
NRBC BLD AUTO-RTO: 0 /100 WBC (ref 0–0.2)
NT-PROBNP SERPL-MCNC: 55.8 PG/ML (ref 0–900)
PLATELET # BLD AUTO: 316 10*3/MM3 (ref 140–450)
PMV BLD AUTO: 9 FL (ref 6–12)
POTASSIUM SERPL-SCNC: 4.3 MMOL/L (ref 3.5–5.2)
PROT SERPL-MCNC: 6.1 G/DL (ref 6–8.5)
PROTHROMBIN TIME: 13.7 SECONDS (ref 11.8–14.8)
QT INTERVAL: 422 MS
QTC INTERVAL: 471 MS
RBC # BLD AUTO: 4.2 10*6/MM3 (ref 3.77–5.28)
SODIUM SERPL-SCNC: 138 MMOL/L (ref 136–145)
WBC NRBC COR # BLD: 8.17 10*3/MM3 (ref 3.4–10.8)

## 2023-01-31 PROCEDURE — 94640 AIRWAY INHALATION TREATMENT: CPT

## 2023-01-31 PROCEDURE — 99024 POSTOP FOLLOW-UP VISIT: CPT | Performed by: INTERNAL MEDICINE

## 2023-01-31 PROCEDURE — G0378 HOSPITAL OBSERVATION PER HR: HCPCS

## 2023-01-31 PROCEDURE — 93005 ELECTROCARDIOGRAM TRACING: CPT | Performed by: INTERNAL MEDICINE

## 2023-01-31 PROCEDURE — 82784 ASSAY IGA/IGD/IGG/IGM EACH: CPT | Performed by: NURSE PRACTITIONER

## 2023-01-31 PROCEDURE — 99222 1ST HOSP IP/OBS MODERATE 55: CPT | Performed by: INTERNAL MEDICINE

## 2023-01-31 PROCEDURE — 93306 TTE W/DOPPLER COMPLETE: CPT | Performed by: INTERNAL MEDICINE

## 2023-01-31 PROCEDURE — 94799 UNLISTED PULMONARY SVC/PX: CPT

## 2023-01-31 PROCEDURE — 85610 PROTHROMBIN TIME: CPT | Performed by: INTERNAL MEDICINE

## 2023-01-31 PROCEDURE — 93306 TTE W/DOPPLER COMPLETE: CPT

## 2023-01-31 PROCEDURE — 85025 COMPLETE CBC W/AUTO DIFF WBC: CPT | Performed by: INTERNAL MEDICINE

## 2023-01-31 PROCEDURE — 93010 ELECTROCARDIOGRAM REPORT: CPT | Performed by: INTERNAL MEDICINE

## 2023-01-31 PROCEDURE — 83880 ASSAY OF NATRIURETIC PEPTIDE: CPT | Performed by: INTERNAL MEDICINE

## 2023-01-31 PROCEDURE — 82962 GLUCOSE BLOOD TEST: CPT

## 2023-01-31 PROCEDURE — 80053 COMPREHEN METABOLIC PANEL: CPT | Performed by: INTERNAL MEDICINE

## 2023-01-31 PROCEDURE — 25010000002 PERFLUTREN 6.52 MG/ML SUSPENSION: Performed by: INTERNAL MEDICINE

## 2023-01-31 PROCEDURE — 82787 IGG 1 2 3 OR 4 EACH: CPT | Performed by: NURSE PRACTITIONER

## 2023-01-31 RX ORDER — TIOTROPIUM BROMIDE INHALATION SPRAY 3.12 UG/1
2 SPRAY, METERED RESPIRATORY (INHALATION)
COMMUNITY

## 2023-01-31 RX ORDER — BUDESONIDE AND FORMOTEROL FUMARATE DIHYDRATE 160; 4.5 UG/1; UG/1
2 AEROSOL RESPIRATORY (INHALATION)
Status: DISCONTINUED | OUTPATIENT
Start: 2023-01-31 | End: 2023-02-01 | Stop reason: HOSPADM

## 2023-01-31 RX ORDER — POTASSIUM CHLORIDE 20 MEQ/1
20 TABLET, EXTENDED RELEASE ORAL DAILY
COMMUNITY

## 2023-01-31 RX ORDER — FLUTICASONE PROPIONATE 220 UG/1
2 AEROSOL, METERED RESPIRATORY (INHALATION)
COMMUNITY
End: 2023-02-01 | Stop reason: HOSPADM

## 2023-01-31 RX ADMIN — METOPROLOL SUCCINATE 100 MG: 100 TABLET, FILM COATED, EXTENDED RELEASE ORAL at 11:51

## 2023-01-31 RX ADMIN — OXYCODONE HYDROCHLORIDE AND ACETAMINOPHEN 1 TABLET: 5; 325 TABLET ORAL at 20:26

## 2023-01-31 RX ADMIN — METOCLOPRAMIDE 5 MG: 5 TABLET ORAL at 20:26

## 2023-01-31 RX ADMIN — METOCLOPRAMIDE 5 MG: 5 TABLET ORAL at 11:51

## 2023-01-31 RX ADMIN — Medication 10 ML: at 20:26

## 2023-01-31 RX ADMIN — Medication 10 ML: at 08:09

## 2023-01-31 RX ADMIN — ATORVASTATIN CALCIUM 10 MG: 10 TABLET, FILM COATED ORAL at 20:26

## 2023-01-31 RX ADMIN — LEVOTHYROXINE SODIUM 150 MCG: 150 TABLET ORAL at 05:55

## 2023-01-31 RX ADMIN — SUCRALFATE 1 G: 1 TABLET ORAL at 08:09

## 2023-01-31 RX ADMIN — METOCLOPRAMIDE 5 MG: 5 TABLET ORAL at 17:18

## 2023-01-31 RX ADMIN — MONTELUKAST SODIUM 10 MG: 10 TABLET, FILM COATED ORAL at 20:26

## 2023-01-31 RX ADMIN — FLUOXETINE HYDROCHLORIDE 20 MG: 20 CAPSULE ORAL at 08:09

## 2023-01-31 RX ADMIN — LOSARTAN POTASSIUM 25 MG: 50 TABLET, FILM COATED ORAL at 11:51

## 2023-01-31 RX ADMIN — PANTOPRAZOLE SODIUM 40 MG: 40 TABLET, DELAYED RELEASE ORAL at 05:55

## 2023-01-31 RX ADMIN — OXYCODONE HYDROCHLORIDE AND ACETAMINOPHEN 1 TABLET: 5; 325 TABLET ORAL at 08:09

## 2023-01-31 RX ADMIN — BUDESONIDE AND FORMOTEROL FUMARATE DIHYDRATE 2 PUFF: 160; 4.5 AEROSOL RESPIRATORY (INHALATION) at 20:13

## 2023-01-31 RX ADMIN — METOCLOPRAMIDE 5 MG: 5 TABLET ORAL at 08:09

## 2023-01-31 RX ADMIN — OXYCODONE HYDROCHLORIDE AND ACETAMINOPHEN 1 TABLET: 5; 325 TABLET ORAL at 04:25

## 2023-01-31 RX ADMIN — PERFLUTREN 8.48 MG: 6.52 INJECTION, SUSPENSION INTRAVENOUS at 10:02

## 2023-01-31 RX ADMIN — OXYCODONE HYDROCHLORIDE AND ACETAMINOPHEN 1 TABLET: 5; 325 TABLET ORAL at 00:24

## 2023-01-31 RX ADMIN — SUCRALFATE 1 G: 1 TABLET ORAL at 11:51

## 2023-01-31 RX ADMIN — BUDESONIDE AND FORMOTEROL FUMARATE DIHYDRATE 2 PUFF: 160; 4.5 AEROSOL RESPIRATORY (INHALATION) at 11:00

## 2023-01-31 NOTE — ANESTHESIA POSTPROCEDURE EVALUATION
"Patient: Ana Small    Procedure Summary     Date: 01/30/23 Room / Location: [facility] CATH LAB 2 /  [facility] CATH INVASIVE LOCATION    Anesthesia Start: 1441 Anesthesia Stop: 1705    Procedure: Pacemaker DC new Diagnosis:       Sick sinus syndrome (HCC)      (SSS)    Providers: Harry Cerrato MD Provider: Glendy Vivas CRNA    Anesthesia Type: MAC ASA Status: 3          Anesthesia Type: MAC    Vitals  Vitals Value Taken Time   /67 01/31/23 0740   Temp 98.1 °F (36.7 °C) 01/31/23 0700   Pulse 80 01/31/23 0750   Resp 13 01/31/23 0600   SpO2 92 % 01/31/23 0750   Vitals shown include unvalidated device data.        Post Anesthesia Care and Evaluation    Patient location during evaluation: PACU  Patient participation: complete - patient participated  Level of consciousness: awake and alert  Pain management: adequate    Airway patency: patent  Anesthetic complications: No anesthetic complications    Cardiovascular status: acceptable  Respiratory status: acceptable  Hydration status: acceptable    Comments: Blood pressure 105/67, pulse 75, temperature 98.1 °F (36.7 °C), temperature source Oral, resp. rate 13, height 160 cm (63\"), weight 120 kg (264 lb), SpO2 96 %, not currently breastfeeding.    Pt discharged from PACU based on yanira score >8      "

## 2023-02-01 VITALS
TEMPERATURE: 98 F | SYSTOLIC BLOOD PRESSURE: 124 MMHG | OXYGEN SATURATION: 95 % | HEART RATE: 73 BPM | RESPIRATION RATE: 16 BRPM | HEIGHT: 63 IN | BODY MASS INDEX: 46.67 KG/M2 | DIASTOLIC BLOOD PRESSURE: 88 MMHG | WEIGHT: 263.4 LBS

## 2023-02-01 LAB
BH CV ECHO MEAS - AO MAX PG: 4 MMHG
BH CV ECHO MEAS - AO MEAN PG: 2 MMHG
BH CV ECHO MEAS - AO ROOT DIAM: 3.1 CM
BH CV ECHO MEAS - AO V2 MAX: 100 CM/SEC
BH CV ECHO MEAS - AO V2 VTI: 23.3 CM
BH CV ECHO MEAS - AVA(I,D): 2.7 CM2
BH CV ECHO MEAS - EDV(CUBED): 64 ML
BH CV ECHO MEAS - EDV(MOD-SP4): 77 ML
BH CV ECHO MEAS - EF(MOD-SP4): 64.2 %
BH CV ECHO MEAS - ESV(CUBED): 10.6 ML
BH CV ECHO MEAS - ESV(MOD-SP4): 27.6 ML
BH CV ECHO MEAS - FS: 45 %
BH CV ECHO MEAS - IVS/LVPW: 1.33 CM
BH CV ECHO MEAS - IVSD: 1.2 CM
BH CV ECHO MEAS - LA DIMENSION: 2.6 CM
BH CV ECHO MEAS - LAT PEAK E' VEL: 7.5 CM/SEC
BH CV ECHO MEAS - LV DIASTOLIC VOL/BSA (35-75): 35.4 CM2
BH CV ECHO MEAS - LV MASS(C)D: 136.2 GRAMS
BH CV ECHO MEAS - LV MAX PG: 3 MMHG
BH CV ECHO MEAS - LV MEAN PG: 2 MMHG
BH CV ECHO MEAS - LV SYSTOLIC VOL/BSA (12-30): 12.7 CM2
BH CV ECHO MEAS - LV V1 MAX: 87.2 CM/SEC
BH CV ECHO MEAS - LV V1 VTI: 16.7 CM
BH CV ECHO MEAS - LVIDD: 4 CM
BH CV ECHO MEAS - LVIDS: 2.2 CM
BH CV ECHO MEAS - LVOT AREA: 3.8 CM2
BH CV ECHO MEAS - LVOT DIAM: 2.2 CM
BH CV ECHO MEAS - LVPWD: 1.2 CM
BH CV ECHO MEAS - MED PEAK E' VEL: 6.7 CM/SEC
BH CV ECHO MEAS - MV A MAX VEL: 74.7 CM/SEC
BH CV ECHO MEAS - MV DEC TIME: 0.18 MSEC
BH CV ECHO MEAS - MV E MAX VEL: 62.6 CM/SEC
BH CV ECHO MEAS - MV E/A: 0.84
BH CV ECHO MEAS - RAP SYSTOLE: 5 MMHG
BH CV ECHO MEAS - RVSP: 9 MMHG
BH CV ECHO MEAS - SI(MOD-SP4): 22.7 ML/M2
BH CV ECHO MEAS - SV(LVOT): 63.5 ML
BH CV ECHO MEAS - SV(MOD-SP4): 49.4 ML
BH CV ECHO MEAS - TR MAX PG: 4 MMHG
BH CV ECHO MEAS - TR MAX VEL: 100 CM/SEC
BH CV ECHO MEASUREMENTS AVERAGE E/E' RATIO: 8.82
IGG SERPL-MCNC: 475 MG/DL (ref 586–1602)
IGG1 SER-MCNC: 251 MG/DL (ref 248–810)
IGG2 SER-MCNC: 128 MG/DL (ref 130–555)
IGG3 SER-MCNC: 24 MG/DL (ref 15–102)
IGG4 SER-MCNC: 24 MG/DL (ref 2–96)
LEFT ATRIUM VOLUME INDEX: 16.7 ML/M2
LEFT ATRIUM VOLUME: 36.3 ML
MAXIMAL PREDICTED HEART RATE: 161 BPM
STRESS TARGET HR: 137 BPM

## 2023-02-01 PROCEDURE — G0378 HOSPITAL OBSERVATION PER HR: HCPCS

## 2023-02-01 PROCEDURE — 94799 UNLISTED PULMONARY SVC/PX: CPT

## 2023-02-01 PROCEDURE — 94664 DEMO&/EVAL PT USE INHALER: CPT

## 2023-02-01 PROCEDURE — 99231 SBSQ HOSP IP/OBS SF/LOW 25: CPT | Performed by: INTERNAL MEDICINE

## 2023-02-01 PROCEDURE — 99024 POSTOP FOLLOW-UP VISIT: CPT | Performed by: NURSE PRACTITIONER

## 2023-02-01 RX ORDER — ACETAMINOPHEN 325 MG/1
650 TABLET ORAL EVERY 6 HOURS PRN
Start: 2023-02-01

## 2023-02-01 RX ORDER — BUDESONIDE AND FORMOTEROL FUMARATE DIHYDRATE 160; 4.5 UG/1; UG/1
2 AEROSOL RESPIRATORY (INHALATION)
Refills: 12
Start: 2023-02-01 | End: 2023-03-14 | Stop reason: ALTCHOICE

## 2023-02-01 RX ADMIN — FLUOXETINE HYDROCHLORIDE 20 MG: 20 CAPSULE ORAL at 09:24

## 2023-02-01 RX ADMIN — Medication 10 ML: at 09:24

## 2023-02-01 RX ADMIN — METOPROLOL SUCCINATE 100 MG: 100 TABLET, FILM COATED, EXTENDED RELEASE ORAL at 09:24

## 2023-02-01 RX ADMIN — PANTOPRAZOLE SODIUM 40 MG: 40 TABLET, DELAYED RELEASE ORAL at 05:19

## 2023-02-01 RX ADMIN — LEVOTHYROXINE SODIUM 150 MCG: 150 TABLET ORAL at 05:19

## 2023-02-01 RX ADMIN — OXYCODONE HYDROCHLORIDE AND ACETAMINOPHEN 1 TABLET: 5; 325 TABLET ORAL at 05:21

## 2023-02-01 RX ADMIN — BUDESONIDE AND FORMOTEROL FUMARATE DIHYDRATE 2 PUFF: 160; 4.5 AEROSOL RESPIRATORY (INHALATION) at 07:40

## 2023-02-01 RX ADMIN — LOSARTAN POTASSIUM 25 MG: 50 TABLET, FILM COATED ORAL at 09:24

## 2023-02-01 RX ADMIN — METOCLOPRAMIDE 5 MG: 5 TABLET ORAL at 09:24

## 2023-02-01 NOTE — DISCHARGE SUMMARY
[facility] DISCHARGE    Date of Discharge:  2/1/2023    Discharge Diagnosis:   Sick sinus syndrome  Dual-chamber pacemaker placement  Irreversible bradycardia  Asthma  History of recurrent pneumonia  Pleural effusion  Diabetes mellitus  Morbid obesity    Presenting Problem/History of Present Illness  Sick sinus syndrome (HCC) [I49.5]    Hospital Course  Patient is a 57 y.o. female presented for planned outpatient dual-chamber pacemaker placement due to symptomatic bradycardia felt to be irreversible.  She previously had evaluation as an outpatient with Holter monitoring.  She presented on 1/30/2023 for procedure.  MAC anesthesia was used for the procedure.  Placement of dual-chamber pacemaker went well however after the procedure the patient complained of chest pain.  Bedside echocardiogram was performed to rule out any significant pericardial effusion or tamponade.  These images were within normal limits.  Stat CT of the chest was performed.  Bilateral pleural effusions were noted and given her history of recurrent pneumonia she was admitted to the critical care unit and pulmonology was consulted on her case.  She was started on Symbicort with plans to continue this at discharge.  Plans are to follow the patient in the outpatient clinic with new PFTs and chest x-ray after discharge.  Postprocedure chest x-ray and device interrogation were satisfactory.  The patient has improved during her hospitalization.  She has had no further chest x-ray.  She is on room air without complaints of shortness of breath.  She has mild tenderness at her device insertion site.  Device site is healing well.  Steri-Strips intact.  No redness.  Mild bruising to the lateral side of the incision.  Discharge instructions have been discussed with the patient including post site care, postprocedural restrictions, follow-up appointments, medication reconciliation, and return to work recommendations.  She has verbalized  understanding of the discharge instructions and is felt to be stable for discharge home today.    Procedures Performed  Procedure(s):  Pacemaker DC new       Consults:   Consults     Date and Time Order Name Status Description    1/30/2023  5:39 PM Inpatient Pulmonology Consult Completed           Pertinent Test Results:   Results for orders placed during the hospital encounter of 01/30/23    Adult Transthoracic Echo Complete W/ Cont if Necessary Per Protocol    Interpretation Summary  •  Left ventricular systolic function is normal. Left ventricular ejection fraction appears to be 61 - 65%.  •  Left ventricular wall thickness is consistent with mild concentric hypertrophy.  •  Left ventricular diastolic function was normal.  •  Estimated right ventricular systolic pressure from tricuspid regurgitation is normal (<35 mmHg).    EXAMINATION: CT CHEST WO CONTRAST DIAGNOSTIC-      1/30/2023 4:52 PM CST     HISTORY: Chest pain, nonspecific; I49.5-Sick sinus syndrome     In order to have a CT radiation dose as low as reasonably achievable  Automated Exposure Control was utilized for adjustment of the mA and/or  KV according to patient size.     DLP in mGycm= 607.     Noncontrast chest CT.     Axial, sagittal, and coronal sequences.     Comparison is made with 11/11/2022.     Stable cardiomegaly.  Cardiac pacer leads.     Coronary artery calcification.  Granulomatous lymph node calcification at the left hilum.     No mediastinal mass or hematoma.     New finding of a trace amount of right pleural fluid.  Associated bibasilar atelectasis.     No pneumothorax and no sign of heart failure.     Moderate thoracic spurring.     Multiple surgical clips are seen within the upper abdomen.  Unchanged 2 cm low-density right adrenal nodule compatible with an  adenoma.     Summary:  1. Trace right pleural fluid and patchy bibasilar atelectasis.  2. No pneumothorax or heart failure.    Pacemaker placement 1/30/2023   Device  Specifics:  Implanted device W1 DR 01 serial number [id number] Med[employer] Iram MRI compatible  Right atrium lead 507 652 cm serial number [id number] and [id number] in the right atrial appendage  Right ventricle model 5076 length 52 cm serial number [id number] and [id number] and the right ventricular septum    Right atrium P wave 4.1 mV pacing impedance 513 ohms resting threshold 0.75 V at 0.4 ms  Right ventricle R wave 15 mV pacing impedance 608 ohms pacing threshold 0.5 V at 0.4 ms     Mode: AAIR <=> DDDR     Impression:     Successful implantation of dual chamber pacemaker     Plan:  - Routine post implant orders, including CXR, pacer teaching.  - Resume medications and diet orders  - Interrogate device in the AM       After pacemaker implantation patient complained of chest pain  Got stat echo in the Cath Lab which was not saved at it as it was done immediately to rule out any significant pericardial effusion or tamponade  None was found  Stat CT scan of the chest was performed  I talked to radiologist  She has bilateral pleural effusion right more than left of unclear significance  She has had recurrent pneumonia  Had COVID infection  Discussed with her  and herself on multiple occasions  Patient was sent to CCU for observation  I went back and saw her in the CCU when she was feeling much better blood pressures normal oxygen saturation normal on room air  Heart rates around 80 to 90 bpm  Complains of some chest pain at the pacemaker insertion site and also on taking deep breaths  Felt also had some pain in her right shoulder  In view of this will observe closely  Patient mentions recurrent pneumonia for the last 3 months and has been told has fluid in her lungs  In view of this will request pulmonary consultation  Also discussed with Dr. Diego Calixto who is on-call during daytime hours  Near about the time of this dictation when I saw her she was feeling better other than soreness at the pacemaker site and pain in her  chest on taking deep breaths but otherwise felt fine and hemodynamically stable with normal oxygen saturation and normal heart rates      Condition on Discharge: Stable    Physical Exam at Discharge  General: alert and oriented, NAD  Card: SR, no mrg  Resp: CTAB  Device Site: left sided chest incision dry and steri strips inract. Mild bruising.     Vital Signs  Temp:  [98 °F (36.7 °C)-98.4 °F (36.9 °C)] 98 °F (36.7 °C)  Heart Rate:  [62-83] 73  Resp:  [12-18] 16  BP: (106-129)/(65-89) 124/88    Discharge Disposition HOME      Discharge Medications     Discharge Medications      New Medications      Instructions Start Date   acetaminophen 325 MG tablet  Commonly known as: Tylenol   650 mg, Oral, Every 6 Hours PRN      budesonide-formoterol 160-4.5 MCG/ACT inhaler  Commonly known as: SYMBICORT   2 puffs, Inhalation, 2 Times Daily - RT         Continue These Medications      Instructions Start Date   albuterol sulfate  (90 Base) MCG/ACT inhaler  Commonly known as: PROVENTIL HFA;VENTOLIN HFA;PROAIR HFA   2 puffs, 2 Times Daily      atorvastatin 10 MG tablet  Commonly known as: LIPITOR   10 mg, Oral, Nightly      levothyroxine 125 MCG tablet  Commonly known as: SYNTHROID, LEVOTHROID   125 mcg, Oral, Daily      losartan 25 MG tablet  Commonly known as: COZAAR   25 mg, Oral, Daily      metoclopramide 5 MG tablet  Commonly known as: REGLAN   5 mg, Oral, 4 Times Daily Before Meals & Nightly      montelukast 10 MG tablet  Commonly known as: SINGULAIR   10 mg, Oral, Nightly      ondansetron ODT 4 MG disintegrating tablet  Commonly known as: ZOFRAN-ODT   4 mg, Translingual, 4 Times Daily PRN      potassium chloride 20 MEQ CR tablet  Commonly known as: K-DUR,KLOR-CON   20 mEq, Oral, Daily      Spiriva Respimat 2.5 MCG/ACT aerosol solution inhaler  Generic drug: tiotropium bromide monohydrate   2 puffs, Inhalation, Daily - RT         Stop These Medications    fluticasone 220 MCG/ACT inhaler  Commonly known as: FLOVENT  HFA          Discharge Diet: Cardiac Diabetic Diet    Activity at Discharge:    Keep sling on left arm for 2-4 weeks.  No lifting left arm above shoulder level for 4 weeks.  No heavy lifting for 2 weeks.  No driving for 2 weeks.  No heavy or strenuous pushing or pulling.  Do not submerge site underwater. Keep dry. If site gets wet in shower, pat dry.  No lotions, powders, or topical ointments to site. Keep open to air and dry.  Call our office, [phone],  with any concerns or if you notice redness, swelling, drainage, warmth, or worsening pain at the site.      Follow-up Appointments  [facility] in 1- 2 weeks.   Additional Instructions for the Follow-ups that You Need to Schedule     XR Chest 2 View   Mar 31, 2023      Schedule in 2 months to be done day of follow up office visit, before the visit.    Exam reason: follow up pleural effusion    Release to patient: Routine Release               Test Results Pending at Discharge  Pending Labs     Order Current Status    IgG subclasses (1-4) In process           Electronically signed by TAMMIE Borrego, 02/01/23, 9:05 AM CST.    Time: 35 minutes

## 2023-02-01 NOTE — DISCHARGE INSTRUCTIONS
Keep sling on left arm for 2-4 weeks.  No lifting left arm above shoulder level for 4 weeks.  No heavy lifting for 2 weeks.  No driving for 2 weeks.  No heavy or strenuous pushing or pulling.  Do not submerge site underwater. Keep dry. If site gets wet in shower, pat dry.  No lotions, powders, or topical ointments to site. Keep open to air and dry.  Call our office, [phone],  with any concerns or if you notice redness, swelling, drainage, warmth, or worsening pain at the site.

## 2023-02-01 NOTE — PROGRESS NOTES
[facility] [place]GRESS NOTE - [facility]    Patient: Ana Small  1965   MR# [id number]   Acct# [id number]  02/01/23   07:35 CST  Referring Provider: Harry Cerrato MD    Chief Complaint: Asthma  Interval history: She anticipates going home today.  She is sitting up at bedside.  There are no labs to review.  Immunoglobulin labs are pending.  She will be discharged home on Symbicort.  She will follow-up in our office with PFT.  Pulmonology is signing off.  Meds:  atorvastatin, 10 mg, Oral, Nightly  budesonide-formoterol, 2 puff, Inhalation, BID - RT  FLUoxetine, 20 mg, Oral, Daily  levothyroxine, 150 mcg, Oral, Q AM  losartan, 25 mg, Oral, Daily  metoclopramide, 5 mg, Oral, 4x Daily AC & at Bedtime  metoprolol succinate XL, 100 mg, Oral, Daily  montelukast, 10 mg, Oral, Nightly  pantoprazole, 40 mg, Oral, Q AM  sodium chloride, 10 mL, Intravenous, Q12H  sodium chloride, 3 mL, Intravenous, Q12H  triamcinolone, , Topical, Q8H      sodium chloride, 60 mL/hr, Last Rate: 60 mL/hr (01/30/23 1818)      Physical Exam:  SpO2 Percentage    01/31/23 2015 02/01/23 0055 02/01/23 0328   SpO2: 94% 93% 91%     Body mass index is 46.67 kg/m².   Temp:  [98 °F (36.7 °C)-98.4 °F (36.9 °C)] 98 °F (36.7 °C)  Heart Rate:  [62-83] 62  Resp:  [12-18] 18  BP: ()/(65-89) 125/65    Intake/Output Summary (Last 24 hours) at 2/1/2023 0735  Last data filed at 1/31/2023 1854  Gross per 24 hour   Intake 240 ml   Output 650 ml   Net -410 ml     Physical Exam  Vitals and nursing note reviewed.   Constitutional:       Appearance: She is well-developed.   HENT:      Head: Normocephalic and atraumatic.   Eyes:      General: No scleral icterus.  Cardiovascular:      Rate and Rhythm: Normal rate and regular rhythm.      Comments: Pacemaker in place  Pulmonary:      Effort: Pulmonary effort is normal.      Breath sounds: No wheezing or rhonchi.   Abdominal:      General: There is no distension.    Musculoskeletal:         General: Normal range of motion.      Cervical back: Normal range of motion and neck supple.   Skin:     General: Skin is warm and dry.   Neurological:      Mental Status: She is alert and oriented to person, place, and time.       Electronically signed by TAMMIE Sy, 2/1/2023, 07:35 CST      Physician substantive portion: medical decision making    Result Review    Laboratory Data:  Results from last 7 days   Lab Units 01/31/23  0526 01/30/23 1909 01/27/23  0935   WBC 10*3/mm3 8.17 12.46* 8.87   HEMOGLOBIN g/dL 12.4 13.9 15.3   PLATELETS 10*3/mm3 316 338 401     Results from last 7 days   Lab Units 01/31/23 0526 01/30/23 1909 01/27/23  0935   SODIUM mmol/L 138 140 139   POTASSIUM mmol/L 4.3 4.8 4.4   CO2 mmol/L 24.0 21.0* 27.0   BUN mg/dL 16 15 20   CREATININE mg/dL 0.78 0.93 0.97   INR  1.04  --  0.96         Microbiology Results (last 10 days)     ** No results found for the last 240 hours. **         Recent films:  Adult Transthoracic Echo Complete W/ Cont if Necessary Per Protocol    Result Date: 2/1/2023  •  Left ventricular systolic function is normal. Left ventricular ejection fraction appears to be 61 - 65%. •  Left ventricular wall thickness is consistent with mild concentric hypertrophy. •  Left ventricular diastolic function was normal. •  Estimated right ventricular systolic pressure from tricuspid regurgitation is normal (<35 mmHg).     CT Chest Without Contrast Diagnostic    Result Date: 1/30/2023  EXAMINATION: CT CHEST WO CONTRAST DIAGNOSTIC-   1/30/2023 4:52 PM CST  HISTORY: Chest pain, nonspecific; I49.5-Sick sinus syndrome  In order to have a CT radiation dose as low as reasonably achievable Automated Exposure Control was utilized for adjustment of the mA and/or KV according to patient size.  DLP in mGycm= 607.  Noncontrast chest CT.  Axial, sagittal, and coronal sequences.  Comparison is made with 11/11/2022.  Stable cardiomegaly. Cardiac pacer  leads.  Coronary artery calcification. Granulomatous lymph node calcification at the left hilum.  No mediastinal mass or hematoma.  New finding of a trace amount of right pleural fluid. Associated bibasilar atelectasis.  No pneumothorax and no sign of heart failure.  Moderate thoracic spurring.  Multiple surgical clips are seen within the upper abdomen. Unchanged 2 cm low-density right adrenal nodule compatible with an adenoma.  Summary: 1. Trace right pleural fluid and patchy bibasilar atelectasis. 2. No pneumothorax or heart failure.            This report was finalized on 01/30/2023 17:08 by Dr. Luis Miguel Melendez MD.    XR Chest 1 View    Result Date: 1/30/2023  EXAMINATION: XR CHEST 1 VW-  1/30/2023 5:40 PM CST  HISTORY: Post Pacer; I49.5-Sick sinus syndrome  1 view chest x-ray.  Comparison is made with 11/11/2022.  Interval placement of a left-sided 2-lead cardiac pacer.  No pneumothorax or heart failure.  Mild interstitial lung disease with mild left basilar atelectasis.  Summary: 1. Stable post procedure chest x-ray. 2. No pneumothorax is seen after pacemaker placement. This report was finalized on 01/30/2023 17:57 by Dr. Luis Miguel Melendez MD.    EP/CRM Study    Result Date: 1/30/2023  Procedure: Implantation of dual chamber pacemaker Indication:  1. Sick sinus syndrome 2. Severe irreversible bradycardia with sick sinus syndrome  3. Supervision of the administration of moderate sedation 4.  Subclavian venography of ipsilateral side to define patency of the vein as well as to assist with access  I have discussed risks, benefits, and alternatives of a permanent pacemaker implant with the patient.  Alternatives discussed include continued observation and medical management.  An pacemaker implant is an inherently high risk procedure with possible complications that include but are not limited to bleeding and hematoma, air embolism, pneumothorax, hemothorax, pericardial effusion requiring pericardiocentesis or cardiac  surgery, lead or device malfunction, infection, contrast induced nephropathy, and ultimately death.   The possible need for additional procedures and/or medical therapy was discussed. Questions asked were appropriately answered.  No guarantees were made or implied.     Despite this, they would still like to proceed.  Procedural Details:  After written and informed consent was obtained, the patient was brought to the cath in a fasting state.  Timeout was taken to confirm the correct patient and procedure.  IV Versed and fentanyl were used to achieve conscious sedation.  Lidocaine was administered for local anesthesia over the left subclavicular region.  Using electrocautery and blunt dissection a skin incision and pocket were made. An 18 gauge needle was used to puncture the subclavian vein (with aid of contrast venography).  Two access sites were used (separate sticks) and guidewires were advanced into the venous circulation.    Sheaths were placed over the wires in sequential fashion.  The RV lead was delivered first and tested and found to be appropriately functioning.  The RA lead was passed in the same fashion and tested.  The leads were secured in the pocket with suture around the sleeves.  After this, the pocket was irrigated with antibiotic solution (IV antibiotics were administered pre-procedure per protocol as well).  The generator was introduced into the field and the leads were connected and secured.  The generator then was placed in the pocket and secured with a nonresorbable stitch.  The pocket was then closed with 3 layers of running 2-0 Vicryl suture. Steri-Strips  a sterile dressing was placed over the site. The patient tolerated the procedure well and there were no immediate complications noted. I supervised the administration of conscious sedation by nursing staff throughout the case.  First dose was given at  and the end of my face-to-face encounter was at      Hrs  , case finished            hrs.   During the case, continuous pulse oximetry, heart rate, blood pressure, and patient status were monitored. Device Specifics: Implanted device W1 DR Keke serial number [id number] Med[employer] Wye MRI compatible Right atrium lead 507 652 cm serial number [id number] and [id number] in the right atrial appendage Right ventricle model 5076 length 52 cm serial number PJ and [id number] and the right ventricular septum Right atrium P wave 4.1 mV pacing impedance 513 ohms resting threshold 0.75 V at 0.4 ms Right ventricle R wave 15 mV pacing impedance 608 ohms pacing threshold 0.5 V at 0.4 ms  Mode: AAIR <=> DDDR Impression: Successful implantation of dual chamber pacemaker Plan: - Routine post implant orders, including CXR, pacer teaching. - Resume medications and diet orders - Interrogate device in the AM  After pacemaker implantation patient complained of chest pain Got stat echo in the Cath Lab which was not saved at it as it was done immediately to rule out any significant pericardial effusion or tamponade None was found Stat CT scan of the chest was performed I talked to radiologist She has bilateral pleural effusion right more than left of unclear significance She has had recurrent pneumonia Had COVID infection Discussed with her  and herself on multiple occasions Patient was sent to CCU for observation I went back and saw her in the CCU when she was feeling much better blood pressures normal oxygen saturation normal on room air Heart rates around 80 to 90 bpm Complains of some chest pain at the pacemaker insertion site and also on taking deep breaths Felt also had some pain in her right shoulder In view of this will observe closely Patient mentions recurrent pneumonia for the last 3 months and has been told has fluid in her lungs In view of this will request pulmonary consultation Also discussed with Dr. Diego Calixto who is on-call during daytime hours Near about the time of this dictation when I saw her she was feeling  better other than soreness at the pacemaker site and pain in her chest on taking deep breaths but otherwise felt fine and hemodynamically stable with normal oxygen saturation and normal heart rates         Pulmonary Assessment:  1. Asthma  2. Recurrent pneumonia    Recommend/plan:   · Immunoglobulins reviewed; minor deficits of doubtful clinical significance  · Continue Symbicort till she can get her rx at pharmacy straight  · Follow up in office    This visit was performed by both a physician and an Advanced Practice RN.  I personally evaluated and examined the patient.  I performed all aspects of the medical decision making as documented.  Electronically signed by Fahad Schmitz MD, 2/1/2023, 16:23 CST

## 2023-02-01 NOTE — PLAN OF CARE
Goal Outcome Evaluation:  Plan of Care Reviewed With: patient        Progress: improving  Outcome Evaluation: VSS, prn pain medication given x2 for incisional pain, sinus 75-90 on telemetry.

## 2023-02-02 ENCOUNTER — TELEPHONE (OUTPATIENT)
Dept: CARDIOLOGY | Facility: CLINIC | Age: 58
End: 2023-02-02
Payer: COMMERCIAL

## 2023-02-02 NOTE — TELEPHONE ENCOUNTER
Caller: Ana Small    Relationship: Self    Best call back number: [phone]     What is the best time to reach you: ANY    Who are you requesting to speak with (clinical staff, provider,  specific staff member): CLINICAL STAFF    Do you know the name of the person who called:  PT    What was the call regarding: PT NEEDS THE SAME WORK EXCUSE HSE HAS RECEIVEDEXCEPT SHE NEEDS IT TO BE DATED 1/27/23 TO 2/3/23 DUE TO PROCEDURE BEING RESCHEDULED. SHE WAS TOLD TO STAY OFF UNTIL SHE GOT IT COMP AND WAS PROVIDED A NOTE HOWEVER-NOTE DOES NOT LIST RESTRICTIONS AND THE PT HAS MISPLACED IT. SHE HAS THE ONE SHE WAS GIVEN YESTERDAY BUT IT JUST HAS THE 1ST THROUGH THE 3RD    Do you require a callback: YES IF NEED CLAIRIFICATION

## 2023-02-08 RX ORDER — ONDANSETRON 2 MG/ML
4 INJECTION INTRAMUSCULAR; INTRAVENOUS ONCE
Status: DISCONTINUED | OUTPATIENT
Start: 2023-02-08 | End: 2023-02-08

## 2023-02-08 RX ORDER — ONDANSETRON 2 MG/ML
4 INJECTION INTRAMUSCULAR; INTRAVENOUS ONCE
Status: DISCONTINUED | OUTPATIENT
Start: 2023-01-30 | End: 2023-02-08

## 2023-02-08 RX ORDER — ONDANSETRON 2 MG/ML
4 INJECTION INTRAMUSCULAR; INTRAVENOUS EVERY 6 HOURS PRN
Status: DISCONTINUED | OUTPATIENT
Start: 2023-01-30 | End: 2023-02-08

## 2023-02-08 RX ORDER — ONDANSETRON 2 MG/ML
2 INJECTION INTRAMUSCULAR; INTRAVENOUS ONCE
Status: DISCONTINUED | OUTPATIENT
Start: 2023-01-30 | End: 2023-02-08

## 2023-02-08 RX ORDER — ONDANSETRON 2 MG/ML
2 INJECTION INTRAMUSCULAR; INTRAVENOUS ONCE
Status: ACTIVE | OUTPATIENT
Start: 2023-01-30

## 2023-02-17 ENCOUNTER — TELEPHONE (OUTPATIENT)
Dept: CARDIOLOGY | Facility: CLINIC | Age: 58
End: 2023-02-17
Payer: COMMERCIAL

## 2023-02-17 NOTE — TELEPHONE ENCOUNTER
PT CALLED BACK IN TO CHECK ON THIS. SHE STATES THERE WAS ONE PREPARED 2 WEEKS AGO AND WAS TOLD TO CALL ON 02.17.23 FOR IT TO BE RE-SENT WITHOUT RESTRICTIONS.

## 2023-02-17 NOTE — TELEPHONE ENCOUNTER
Caller: Ana Small    Relationship: Self    Best call back number: [phone]    What form or medical record are you requesting: RELEASE LETTER TO RETURN TO WORK    Who is requesting this form or medical record from you: WORK     How would you like to receive the form or medical records (pick-up, mail, fax): FAX  If fax, what is the fax number: [phone]    Timeframe paperwork needed: ASAP    Additional notes: PATIENT HAD A PACEMAKER PUT IN AND NEEDS CLEARANCE FAXED OVER TO HER JOB.

## 2023-02-20 NOTE — TELEPHONE ENCOUNTER
Dr. Cerrato has agreed to release patient. Once letter has been signed, I will fax to patients employer as requested by patient.    Thank you  WF

## 2023-03-14 ENCOUNTER — OFFICE VISIT (OUTPATIENT)
Dept: CARDIOLOGY | Facility: CLINIC | Age: 58
End: 2023-03-14
Payer: COMMERCIAL

## 2023-03-14 VITALS
DIASTOLIC BLOOD PRESSURE: 91 MMHG | HEIGHT: 62 IN | BODY MASS INDEX: 48.58 KG/M2 | WEIGHT: 264 LBS | HEART RATE: 111 BPM | SYSTOLIC BLOOD PRESSURE: 136 MMHG

## 2023-03-14 DIAGNOSIS — Z87.891 EX-SMOKER FOR MORE THAN 1 YEAR: ICD-10-CM

## 2023-03-14 DIAGNOSIS — I47.1 PSVT (PAROXYSMAL SUPRAVENTRICULAR TACHYCARDIA): ICD-10-CM

## 2023-03-14 DIAGNOSIS — R55 SYNCOPE AND COLLAPSE: Primary | ICD-10-CM

## 2023-03-14 DIAGNOSIS — I51.7 LVH (LEFT VENTRICULAR HYPERTROPHY): ICD-10-CM

## 2023-03-14 DIAGNOSIS — Z99.89 OSA ON CPAP: ICD-10-CM

## 2023-03-14 DIAGNOSIS — G40.909 SEIZURE DISORDER: ICD-10-CM

## 2023-03-14 DIAGNOSIS — G47.33 OSA ON CPAP: ICD-10-CM

## 2023-03-14 DIAGNOSIS — E66.01 MORBID OBESITY DUE TO EXCESS CALORIES: ICD-10-CM

## 2023-03-14 PROCEDURE — 99024 POSTOP FOLLOW-UP VISIT: CPT | Performed by: INTERNAL MEDICINE

## 2023-03-14 RX ORDER — FLUTICASONE PROPIONATE 220 UG/1
AEROSOL, METERED RESPIRATORY (INHALATION)
COMMUNITY
Start: 2023-03-03

## 2023-03-14 RX ORDER — METOPROLOL TARTRATE 50 MG/1
50 TABLET, FILM COATED ORAL 2 TIMES DAILY
Qty: 180 TABLET | Refills: 3 | Status: SHIPPED | OUTPATIENT
Start: 2023-03-14

## 2023-03-14 NOTE — PROGRESS NOTES
Ana Small  [id number]  1965  57 y.o.  female    Referring Provider: Camilla Ellison MD    Reason for  Visit: Here for routine follow up   Recent pacer   Surgical wound healed very well. No evidence of excessive bruising, pain, redness, swelling, discharge or foul odor noted  Patient declined any recent history of fever, chills, pain or warmth locally       Subjective    Mild chronic exertional shortness of breath on exertion relieved with rest  No significant cough or wheezing    No palpitations  No associated chest pain  No significant pedal edema    No fever or chills  No significant expectoration    No hemoptysis  No presyncope or syncope    Tolerating current medications well with no untoward side effects   Compliant with prescribed medication regimen. Tries to adhere to cardiac diet.     Intermittent palpitations, several times a week lasting for less than 1 minute to 5 mins   Associated symptoms of dizziness, weakness, chest pain,  shortness of breath  and also gets nauseous     Dizzy spells much  Better after pacer implant    obstructive sleep apnea on CPAP   No bleeding, excessive bruising, gait instability or fall risks      Device check as below        History of present illness:  Ana Small is a 57 y.o. yo female with sick sinus syndrome s/p pacemaker  who presents today for   Chief Complaint   Patient presents with   • Sick Sinus Syndrome     1 mo - s/p pacemaker   .    History  Past Medical History:   Diagnosis Date   • Abnormal ECG 12/2022   • Arrhythmia 2016    SVT.  Has cardiac ablation 2016 or 2017   • Asthma 2020   • Cancer (HCC) 2017    Thyroid   • COPD (chronic obstructive pulmonary disease) (HCC) 2020   • Diabetes mellitus (HCC)    • Disease of thyroid gland     PARATHYROID TUMOR REMOVED   • Heart murmur 2012    PCP in [place] mentioned it's never been mentioned again.   • Hiatal hernia    • Hyperlipidemia    • Hypertension    • Migraines    • MARINO on CPAP 3/14/2023   • Pneumonia     • PONV (postoperative nausea and vomiting)    • Seizures (HCC) , 2016    X 3 TOTAL   • Stroke (HCC)     APHASIA, SLIGHT WEAKNESS ON RIGHT SIDE (INTENSIFIED WITH FATIGUE)   • SVT (supraventricular tachycardia) ([facility])    • Tachycardia    ,   Past Surgical History:   Procedure Laterality Date   • ABLATION OF DYSRHYTHMIC FOCUS   or 17   • BREAST BIOPSY Right 2017    Procedure: RIGHT BREAST EXCISIONAL BIOPSY;  Surgeon: Radha Moseley MD;  Location:  [facility] OR;  Service:    • CARDIAC ABLATION  2017   • CARDIAC ELECTROPHYSIOLOGY PROCEDURE N/A 2023    Procedure: Pacemaker DC new  for irreversible sinus node dysfunction;  Surgeon: Harry Cerrato MD;  Location:  [facility] CATH INVASIVE LOCATION;  Service: Cardiology;  Laterality: N/A;   • CARDIAC ELECTROPHYSIOLOGY PROCEDURE N/A 2023    Procedure: Pacemaker DC new;  Surgeon: Harry Cerrato MD;  Location:  [facility] CATH INVASIVE LOCATION;  Service: Cardiology;  Laterality: N/A;   •  SECTION     • HEMANGIOMA EXCISION      from liver.    • HERNIA REPAIR     • INSERT / REPLACE / REMOVE PACEMAKER     • LIVER RESECTION      Giant hemangioma   • PARATHYROID GLAND SURGERY     • PILONIDAL CYSTECTOMY     • TONSILLECTOMY     ,   Family History   Problem Relation Age of Onset   • Breast cancer Maternal Aunt    • Cancer Mother         LIVER   • Diabetes Mother    • Heart disease Mother    • Anemia Mother         Pernicious anemia - took B-12 shots   • Hypertension Mother         Uncontrolled   • Arrhythmia Paternal Grandfather         Some type of congenital arrhythmia   • Heart attack Paternal Grandfather         Caused by some congenital arrhythmia   ,   Social History     Tobacco Use   • Smoking status: Former     Packs/day: 2.00     Years: 15.00     Pack years: 30.00     Types: Cigarettes     Quit date: 2016     Years since quittin.5   • Smokeless tobacco: Never   • Tobacco comments:     Off/on since age 27. Quit for years at a time.    Vaping Use   • Vaping Use: Never used   Substance Use Topics   • Alcohol use: No   • Drug use: No   ,     Medications  Current Outpatient Medications   Medication Sig Dispense Refill   • acetaminophen (Tylenol) 325 MG tablet Take 2 tablets by mouth Every 6 (Six) Hours As Needed for Mild Pain or Moderate Pain (at pacer site).     • albuterol sulfate  (90 Base) MCG/ACT inhaler 2 puffs 2 (Two) Times a Day.     • atorvastatin (LIPITOR) 10 MG tablet Take 1 tablet by mouth Every Night.     • Flovent  MCG/ACT inhaler INHALE 2 PUFFS INTO THE LUNGS TWICE A DAY FOR 90 DAYS     • levothyroxine (SYNTHROID, LEVOTHROID) 125 MCG tablet Take 1 tablet by mouth Daily.     • losartan (COZAAR) 25 MG tablet Take 1 tablet by mouth Daily.     • metoclopramide (REGLAN) 5 MG tablet Take 1 tablet by mouth 4 (Four) Times a Day Before Meals & at Bedtime.     • metoprolol tartrate (LOPRESSOR) 50 MG tablet Take 1 tablet by mouth 2 (Two) Times a Day. 180 tablet 3   • montelukast (SINGULAIR) 10 MG tablet Take 1 tablet by mouth Every Night.     • ondansetron ODT (ZOFRAN-ODT) 4 MG disintegrating tablet Place 1 tablet on the tongue 4 (Four) Times a Day As Needed for Nausea or Vomiting. 12 tablet 0   • potassium chloride (K-DUR,KLOR-CON) 20 MEQ CR tablet Take 1 tablet by mouth Daily.     • tiotropium bromide monohydrate (Spiriva Respimat) 2.5 MCG/ACT aerosol solution inhaler Inhale 2 puffs Daily.     • dronedarone (Multaq) 400 MG tablet Take 1 tablet by mouth 2 (Two) Times a Day With Meals. 60 tablet 11     No current facility-administered medications for this visit.     Facility-Administered Medications Ordered in Other Visits   Medication Dose Route Frequency Provider Last Rate Last Admin   • ondansetron (ZOFRAN) injection 2 mg  2 mg Intravenous Once Harry Cerrato MD           Allergies:  Latex and Sulfa antibiotics    Review of Systems  Review of Systems   Constitutional: Negative.   HENT: Negative.    Eyes: Negative.   "  Cardiovascular: Positive for dyspnea on exertion and palpitations. Negative for chest pain, claudication, cyanosis, irregular heartbeat, leg swelling, near-syncope, orthopnea, paroxysmal nocturnal dyspnea and syncope.   Respiratory: Positive for shortness of breath.    Endocrine: Negative.    Hematologic/Lymphatic: Negative.    Skin: Negative.    Gastrointestinal: Negative for anorexia.   Genitourinary: Negative.    Neurological: Negative.    Psychiatric/Behavioral: Negative.        Objective     Physical Exam:  /91   Pulse 111   Ht 157.5 cm (62\")   Wt 120 kg (264 lb)   BMI 48.29 kg/m²     Physical Exam  Constitutional:       Appearance: Normal appearance.   HENT:      Head: Normocephalic.   Eyes:      General: Lids are normal.   Neck:      Vascular: No carotid bruit.   Cardiovascular:      Rate and Rhythm: Regular rhythm.      Heart sounds: Normal heart sounds, S1 normal and S2 normal.    No systolic murmur is present.  Pulmonary:      Effort: Pulmonary effort is normal.   Abdominal:      Palpations: Abdomen is soft.   Neurological:      Mental Status: She is alert.   Psychiatric:         Speech: Speech normal.         Behavior: Behavior normal.         Thought Content: Thought content normal.         Results Review:         ____________________________________________________________________________________________________________________________________________  Health maintenance and recommendations    Low salt/ HTN/ Heart healthy carbohydrate restricted cardiac diet   The patient is advised to reduce or avoid caffeine or other cardiac stimulants.   Minimize or avoid  NSAID-type medications      Monitor for any signs of bleeding including red or dark stools. Fall precautions.   Advised staying uptodate with immunizations per established standard guidelines.    Offered to give patient  a copy of my notes     Questions were encouraged, asked and answered to the patient's  understanding and " satisfaction. Questions if any regarding current medications and side effects, need for refills and importance of compliance to medications stressed.    Reviewed available prior notes, consults, prior visits, laboratory findings, radiology and cardiology relevant reports. Updated chart as applicable. I have reviewed the patient's medical history in detail and updated the computerized patient record as relevant.      Updated patient regarding any new or relevant abnormalities on review of records or any new findings on physical exam. Mentioned to patient about purpose of visit and desirable health short and long term goals and objectives.    Primary to monitor CBC CMP Lipid panel and TSH as applicable    ___________________________________________________________________________________________________________________________________________   Procedures    Assessment & Plan   Diagnoses and all orders for this visit:    1. Syncope and collapse (Primary)    2. PSVT (paroxysmal supraventricular tachycardia) (HCC)  -     Ambulatory Referral to Cardiac Electrophysiology    3. Morbid obesity due to excess calories (HCC)    4. Seizure disorder (HCC)    5. Ex-smoker for more than 1 year    6. MARINO on CPAP    7. LVH (left ventricular hypertrophy) moderate     Other orders  -     dronedarone (Multaq) 400 MG tablet; Take 1 tablet by mouth 2 (Two) Times a Day With Meals.  Dispense: 60 tablet; Refill: 11  -     metoprolol tartrate (LOPRESSOR) 50 MG tablet; Take 1 tablet by mouth 2 (Two) Times a Day.  Dispense: 180 tablet; Refill: 3          Plan    Patient expressed understanding  Encouraged and answered all questions   Discussed with the patient and all questioned fully answered. She will call me if any problems arise.   Discussed results of prior testing with patient : echo from [place] with moderate LVH     Start   Requested Prescriptions     Signed Prescriptions Disp Refills   • dronedarone (Multaq) 400 MG tablet 60 tablet  11     Sig: Take 1 tablet by mouth 2 (Two) Times a Day With Meals.   • metoprolol tartrate (LOPRESSOR) 50 MG tablet 180 tablet 3     Sig: Take 1 tablet by mouth 2 (Two) Times a Day.      Continue beta blocker therapy and increase dose   Will benefit form electrophysiology consultation with Dr Elizondo      Orders Placed This Encounter   Procedures   • Ambulatory Referral to Cardiac Electrophysiology     Referral Priority:   Routine     Referral Type:   Consultation     Referral Reason:   Specialty Services Required     Requested Specialty:   Cardiac Electrophysiology     Number of Visits Requested:   1      Check BP and heart rates twice daily initially till blood pressures and heart rates under good control and then at least 3x / week,   If blood pressures continue to be well-controlled then can check week a month  at home and bring a recording for review next visit  If BP >130/85 or < 100/60 persistently over 3 reading 30 mins apart or if heart rates persistently above 100 bpm or less than 55 bpm call sooner for evaluation and advise     Monitor cardiac rhythm device function regularly per established schedule or PRN      Patient was advised to continue CPAP daily.           Return in about 6 months (around 9/14/2023).

## 2023-05-05 ENCOUNTER — TELEPHONE (OUTPATIENT)
Dept: CARDIOLOGY | Facility: CLINIC | Age: 58
End: 2023-05-05

## 2023-05-05 NOTE — TELEPHONE ENCOUNTER
Caller: RUTH    Relationship: SELF    Best call back number: [phone]    What is the best time to reach you: ANY    Who are you requesting to speak with (clinical staff, provider,  specific staff member): ANY    Do you know the name of the person who called:     What was the call regarding: PATIENT CALLED IN DUE TO NOT BEING ABLE TO KEEP SCHEDULED APPT. SCHEDULED FIRST AVAIL THAT PATIENT COULD KEEP. PLEASE ADVISE IF APPT IS OK. THANK YOU!    Do you require a callback: ONLY IF APPT ISN'T OK

## 2023-09-12 ENCOUNTER — OFFICE VISIT (OUTPATIENT)
Dept: CARDIOLOGY | Facility: CLINIC | Age: 58
End: 2023-09-12
Payer: COMMERCIAL

## 2023-09-12 ENCOUNTER — PREP FOR SURGERY (OUTPATIENT)
Dept: OTHER | Facility: HOSPITAL | Age: 58
End: 2023-09-12
Payer: COMMERCIAL

## 2023-09-12 VITALS
SYSTOLIC BLOOD PRESSURE: 122 MMHG | OXYGEN SATURATION: 97 % | HEART RATE: 88 BPM | WEIGHT: 260 LBS | HEIGHT: 62 IN | RESPIRATION RATE: 18 BRPM | BODY MASS INDEX: 47.84 KG/M2 | DIASTOLIC BLOOD PRESSURE: 88 MMHG

## 2023-09-12 DIAGNOSIS — I47.1 SUSTAINED SVT: Primary | ICD-10-CM

## 2023-09-12 DIAGNOSIS — I49.5 SICK SINUS SYNDROME: ICD-10-CM

## 2023-09-12 RX ORDER — SODIUM CHLORIDE 0.9 % (FLUSH) 0.9 %
10 SYRINGE (ML) INJECTION EVERY 12 HOURS SCHEDULED
OUTPATIENT
Start: 2023-09-12

## 2023-09-12 RX ORDER — SODIUM CHLORIDE 9 MG/ML
40 INJECTION, SOLUTION INTRAVENOUS AS NEEDED
OUTPATIENT
Start: 2023-09-12

## 2023-09-12 RX ORDER — ESCITALOPRAM OXALATE 5 MG/1
5 TABLET ORAL DAILY
COMMUNITY

## 2023-09-12 RX ORDER — SODIUM CHLORIDE 0.9 % (FLUSH) 0.9 %
10 SYRINGE (ML) INJECTION AS NEEDED
OUTPATIENT
Start: 2023-09-12

## 2023-09-12 RX ORDER — BUSPIRONE HYDROCHLORIDE 5 MG/1
5 TABLET ORAL 3 TIMES DAILY
COMMUNITY

## 2023-09-12 NOTE — PROGRESS NOTES
"Chief Complaint  Rapid Heart Rate (NP)    Subjective        History of Present Illness    EP Problems:  1.  Sinus node function  -1/30/2023: Dual-chamber permanent pacemaker implant, Saritha Cerrato  2.  Sustained SVT  -5/18/2017: SVT ablation, [facility]    Cardiology Problems:  1.  LVH    Medical Problems:  1.  Hypothyroidism  2.  Morbid obesity  9/2023: BMI 47  3.  Papillary adenocarcinoma of the thyroid  4.  Seizure disorder  5.  Obstructive sleep apnea      Ana Small is a 58 y.o. female with problem list as above who presents to the clinic for follow up of SVT, sinus node dysfunction.  She underwent previous SVT ablation with Dr. Krueger on 5/25/2017.  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  She continues to have episodes where she feels poorly with symptoms lasting all day in duration.  She recently underwent permanent pacemaker implant for sinus node dysfunction.  Her device interrogations have shown that she has frequent prolonged episodes where her heart rates are staying around 150 bpm in a sustained manner lasting for several hours in duration.  The tachycardia appears to be a mid RP tachycardia.  Given these findings, she was referred to EP for further evaluation.    Objective   Vital Signs:  /88 (BP Location: Right arm, Patient Position: Sitting)   Pulse 88   Resp 18   Ht 157.5 cm (62\")   Wt 118 kg (260 lb)   SpO2 97%   BMI 47.55 kg/m²   Estimated body mass index is 47.55 kg/m² as calculated from the following:    Height as of this encounter: 157.5 cm (62\").    Weight as of this encounter: 118 kg (260 lb).      Physical Exam  Vitals reviewed.   Constitutional:       Appearance: Normal appearance.   Cardiovascular:      Rate and Rhythm: Normal rate and regular rhythm.      Pulses: Normal pulses.      Heart sounds: Normal heart sounds. No murmur heard.  Pulmonary:      Effort: Pulmonary effort is normal. No respiratory distress.     "  Breath sounds: Normal breath sounds.   Skin:     General: Skin is warm and dry.   Neurological:      General: No focal deficit present.      Mental Status: She is alert and oriented to person, place, and time.   Psychiatric:         Mood and Affect: Mood normal.         Judgment: Judgment normal.      Result Review :  The following data was reviewed by: Emilia Elizondo MD on 09/12/2023:        ECG 12 Lead    Date/Time: 9/12/2023 11:20 AM  Performed by: Emilia Elizondo MD  Authorized by: Emilia Elizondo MD   Comparison: compared with previous ECG from 1/31/2023  Similar to previous ECG  Rhythm: sinus rhythm  Rate: normal  Conduction: conduction normal  QRS axis: normal  Other findings: low voltage    Clinical impression: non-specific ECG            Assessment and Plan   Diagnoses and all orders for this visit:    1. Sustained SVT (Primary)    2. Sick sinus syndrome    Other orders  -     ECG 12 Lead        Ana Small is a 58 y.o. female with problem list as above who presents to the clinic for follow up of sustained SVT, sick sinus syndrome.  Her device interrogation findings are compatible with sustained SVT consistent with mid RP tachycardia.  I suspect that this is likely the same SVT that she had dating back to 2017 with incomplete resolution.  Given these findings, I do think that a redo ablation would be her next best treatment option.  I have discussed risks, benefits, and alternatives of an electrophysiology study and ablation for supraventricular tachycardia with the patient.  Alternatives discussed include continued observation and medical management.  An electrophysiology study with ablation is an inherently high risk procedure with possible complications that include but are not limited to vascular access complications, internal bleeding, tamponade requiring pericardiocentesis or cardiac surgery, stroke, MI, embolism, myocardial injury, injury to the normal conduction system requiring a pacemaker, and  ultimately death.  We also discussed that given the uncertain nature of the diagnosis, therapeutic efficacy can be variable.  Possibilities of recurrent arrhythmias and the possible need for additional procedures and/or medical therapy was discussed. Questions asked were appropriately answered.  No guarantees were made or implied.     Despite this, they would still like to proceed.      Plan:  -Schedule for redo SVT ablation on October 9  -No additional medication changes for now  -Continue remote monitoring for her pacemaker  -Work on weight loss and exercise         Follow Up   Return in about 2 months (around 11/12/2023).  Patient was given instructions and counseling regarding her condition or for health maintenance advice. Please see specific information pulled into the AVS if appropriate.     Part of this note may be an electronic transcription/translation of spoken language to printed text using the Dragon Dictation System.

## 2023-09-12 NOTE — H&P (VIEW-ONLY)
"Chief Complaint  Rapid Heart Rate (NP)    Subjective        History of Present Illness    EP Problems:  1.  Sinus node function  -1/30/2023: Dual-chamber permanent pacemaker implant, Saritha Cerrato  2.  Sustained SVT  -5/18/2017: SVT ablation, [facility]    Cardiology Problems:  1.  LVH    Medical Problems:  1.  Hypothyroidism  2.  Morbid obesity  9/2023: BMI 47  3.  Papillary adenocarcinoma of the thyroid  4.  Seizure disorder  5.  Obstructive sleep apnea      Ana Small is a 58 y.o. female with problem list as above who presents to the clinic for follow up of SVT, sinus node dysfunction.  She underwent previous SVT ablation with Dr. Krueger on 5/25/2017.  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  She continues to have episodes where she feels poorly with symptoms lasting all day in duration.  She recently underwent permanent pacemaker implant for sinus node dysfunction.  Her device interrogations have shown that she has frequent prolonged episodes where her heart rates are staying around 150 bpm in a sustained manner lasting for several hours in duration.  The tachycardia appears to be a mid RP tachycardia.  Given these findings, she was referred to EP for further evaluation.    Objective   Vital Signs:  /88 (BP Location: Right arm, Patient Position: Sitting)   Pulse 88   Resp 18   Ht 157.5 cm (62\")   Wt 118 kg (260 lb)   SpO2 97%   BMI 47.55 kg/mý   Estimated body mass index is 47.55 kg/mý as calculated from the following:    Height as of this encounter: 157.5 cm (62\").    Weight as of this encounter: 118 kg (260 lb).      Physical Exam  Vitals reviewed.   Constitutional:       Appearance: Normal appearance.   Cardiovascular:      Rate and Rhythm: Normal rate and regular rhythm.      Pulses: Normal pulses.      Heart sounds: Normal heart sounds. No murmur heard.  Pulmonary:      Effort: Pulmonary effort is normal. No respiratory distress.     "  Breath sounds: Normal breath sounds.   Skin:     General: Skin is warm and dry.   Neurological:      General: No focal deficit present.      Mental Status: She is alert and oriented to person, place, and time.   Psychiatric:         Mood and Affect: Mood normal.         Judgment: Judgment normal.      Result Review :  The following data was reviewed by: Emilia Elizondo MD on 09/12/2023:        ECG 12 Lead    Date/Time: 9/12/2023 11:20 AM  Performed by: Emilia Elizondo MD  Authorized by: Emilia Elizondo MD   Comparison: compared with previous ECG from 1/31/2023  Similar to previous ECG  Rhythm: sinus rhythm  Rate: normal  Conduction: conduction normal  QRS axis: normal  Other findings: low voltage    Clinical impression: non-specific ECG            Assessment and Plan   Diagnoses and all orders for this visit:    1. Sustained SVT (Primary)    2. Sick sinus syndrome    Other orders  -     ECG 12 Lead        Ana Small is a 58 y.o. female with problem list as above who presents to the clinic for follow up of sustained SVT, sick sinus syndrome.  Her device interrogation findings are compatible with sustained SVT consistent with mid RP tachycardia.  I suspect that this is likely the same SVT that she had dating back to 2017 with incomplete resolution.  Given these findings, I do think that a redo ablation would be her next best treatment option.  I have discussed risks, benefits, and alternatives of an electrophysiology study and ablation for supraventricular tachycardia with the patient.  Alternatives discussed include continued observation and medical management.  An electrophysiology study with ablation is an inherently high risk procedure with possible complications that include but are not limited to vascular access complications, internal bleeding, tamponade requiring pericardiocentesis or cardiac surgery, stroke, MI, embolism, myocardial injury, injury to the normal conduction system requiring a pacemaker, and  ultimately death.  We also discussed that given the uncertain nature of the diagnosis, therapeutic efficacy can be variable.  Possibilities of recurrent arrhythmias and the possible need for additional procedures and/or medical therapy was discussed. Questions asked were appropriately answered.  No guarantees were made or implied.     Despite this, they would still like to proceed.      Plan:  -Schedule for redo SVT ablation on October 9  -No additional medication changes for now  -Continue remote monitoring for her pacemaker  -Work on weight loss and exercise         Follow Up   Return in about 2 months (around 11/12/2023).  Patient was given instructions and counseling regarding her condition or for health maintenance advice. Please see specific information pulled into the AVS if appropriate.     Part of this note may be an electronic transcription/translation of spoken language to printed text using the Dragon Dictation System.

## 2023-10-02 ENCOUNTER — TELEPHONE (OUTPATIENT)
Dept: CARDIOLOGY | Facility: CLINIC | Age: 58
End: 2023-10-02
Payer: COMMERCIAL

## 2023-10-02 NOTE — TELEPHONE ENCOUNTER
Caller: Ana Small    Relationship: Self    Best call back number: [phone]    What medications are you currently taking:   Current Outpatient Medications on File Prior to Visit   Medication Sig Dispense Refill    acetaminophen (Tylenol) 325 MG tablet Take 2 tablets by mouth Every 6 (Six) Hours As Needed for Mild Pain or Moderate Pain (at [place] site).      albuterol sulfate  (90 Base) MCG/ACT inhaler 2 puffs 2 (Two) Times a Day.      atorvastatin (LIPITOR) 10 MG tablet Take 1 tablet by mouth Every Night.      busPIRone (BUSPAR) 5 MG tablet Take 1 tablet by mouth 3 (Three) Times a Day.      escitalopram (LEXAPRO) 5 MG tablet Take 1 tablet by mouth Daily.      Flovent  MCG/ACT inhaler INHALE 2 PUFFS INTO THE LUNGS TWICE A DAY FOR 90 DAYS      levothyroxine (SYNTHROID, LEVOTHROID) 125 MCG tablet Take 1 tablet by mouth Daily.      losartan (COZAAR) 25 MG tablet Take 1 tablet by mouth Daily.      metoclopramide (REGLAN) 5 MG tablet Take 1 tablet by mouth 4 (Four) Times a Day Before Meals & at Bedtime.      metoprolol tartrate (LOPRESSOR) 50 MG tablet Take 1 tablet by mouth 2 (Two) Times a Day. (Patient taking differently: Take 1 tablet by mouth 1 (One) Time.) 180 tablet 3    montelukast (SINGULAIR) 10 MG tablet Take 1 tablet by mouth Every Night.      ondansetron ODT (ZOFRAN-ODT) 4 MG disintegrating tablet Place 1 tablet on the tongue 4 (Four) Times a Day As Needed for Nausea or Vomiting. 12 tablet 0    tiotropium bromide monohydrate (Spiriva Respimat) 2.5 MCG/ACT aerosol solution inhaler Inhale 2 puffs Daily.       Current Facility-Administered Medications on File Prior to Visit   Medication Dose Route Frequency Provider Last Rate Last Admin    ondansetron (ZOFRAN) injection 2 mg  2 mg Intravenous Once Harry Cerrato MD              When did you start taking these medications: 10 WEEKS    Which medication are you concerned about: JAS     Who prescribed you this medication: DR. HANDY  RUSSO    What are your concerns: PT IS CALLING TO SEE IF SHE SHOULD CONTINUE TAKING HUMIRA SHOTS EVERY TWO WEEKS BEFORE SHE HAS HER PROCEDURE ON 10.9.23. PT WAS SUPPOSED TO HAVE IT LAST WEDNESDAY BUT SHE'S GOING TO HAVE IT TODAY.    How long have you had these concerns: TODAY

## 2023-10-09 ENCOUNTER — HOSPITAL ENCOUNTER (OUTPATIENT)
Facility: HOSPITAL | Age: 58
Setting detail: HOSPITAL OUTPATIENT SURGERY
Discharge: HOME | End: 2023-10-09
Attending: STUDENT IN AN ORGANIZED HEALTH CARE EDUCATION/TRAINING PROGRAM | Admitting: STUDENT IN AN ORGANIZED HEALTH CARE EDUCATION/TRAINING PROGRAM
Payer: COMMERCIAL

## 2023-10-09 ENCOUNTER — ANESTHESIA EVENT (OUTPATIENT)
Dept: CARDIOLOGY | Facility: HOSPITAL | Age: 58
End: 2023-10-09
Payer: COMMERCIAL

## 2023-10-09 ENCOUNTER — ANESTHESIA (OUTPATIENT)
Dept: CARDIOLOGY | Facility: HOSPITAL | Age: 58
End: 2023-10-09
Payer: COMMERCIAL

## 2023-10-09 VITALS
OXYGEN SATURATION: 100 % | SYSTOLIC BLOOD PRESSURE: 133 MMHG | HEART RATE: 103 BPM | HEIGHT: 63 IN | TEMPERATURE: 97 F | BODY MASS INDEX: 46.27 KG/M2 | WEIGHT: 261.12 LBS | DIASTOLIC BLOOD PRESSURE: 99 MMHG | RESPIRATION RATE: 16 BRPM

## 2023-10-09 VITALS
SYSTOLIC BLOOD PRESSURE: 115 MMHG | TEMPERATURE: 93.7 F | HEART RATE: 136 BPM | OXYGEN SATURATION: 99 % | DIASTOLIC BLOOD PRESSURE: 77 MMHG

## 2023-10-09 DIAGNOSIS — I47.10 SUSTAINED SVT: ICD-10-CM

## 2023-10-09 LAB
ANION GAP SERPL CALCULATED.3IONS-SCNC: 13 MMOL/L (ref 5–15)
BASOPHILS # BLD AUTO: 0.05 10*3/MM3 (ref 0–0.2)
BASOPHILS NFR BLD AUTO: 0.9 % (ref 0–1.5)
BUN SERPL-MCNC: 19 MG/DL (ref 8–23)
BUN/CREAT SERPL: 18.3 (ref 7–25)
CALCIUM SPEC-SCNC: 8.7 MG/DL (ref 8.6–10.5)
CHLORIDE SERPL-SCNC: 102 MMOL/L (ref 98–107)
CO2 SERPL-SCNC: 26 MMOL/L (ref 22–29)
CREAT SERPL-MCNC: 1.04 MG/DL (ref 0.57–1)
DEPRECATED RDW RBC AUTO: 49.1 FL (ref 37–54)
EGFRCR SERPLBLD CKD-EPI 2021: 61.7 ML/MIN/1.73
EOSINOPHIL # BLD AUTO: 0.3 10*3/MM3 (ref 0–0.4)
EOSINOPHIL NFR BLD AUTO: 5.2 % (ref 0.3–6.2)
ERYTHROCYTE [DISTWIDTH] IN BLOOD BY AUTOMATED COUNT: 14.6 % (ref 12.3–15.4)
GLUCOSE SERPL-MCNC: 150 MG/DL (ref 65–99)
HCT VFR BLD AUTO: 45.9 % (ref 34–46.6)
HGB BLD-MCNC: 14.5 G/DL (ref 12–15.9)
IMM GRANULOCYTES # BLD AUTO: 0.03 10*3/MM3 (ref 0–0.05)
IMM GRANULOCYTES NFR BLD AUTO: 0.5 % (ref 0–0.5)
INR PPP: 0.86 (ref 0.91–1.09)
LYMPHOCYTES # BLD AUTO: 1.28 10*3/MM3 (ref 0.7–3.1)
LYMPHOCYTES NFR BLD AUTO: 22.2 % (ref 19.6–45.3)
MCH RBC QN AUTO: 29 PG (ref 26.6–33)
MCHC RBC AUTO-ENTMCNC: 31.6 G/DL (ref 31.5–35.7)
MCV RBC AUTO: 91.8 FL (ref 79–97)
MONOCYTES # BLD AUTO: 0.45 10*3/MM3 (ref 0.1–0.9)
MONOCYTES NFR BLD AUTO: 7.8 % (ref 5–12)
NEUTROPHILS NFR BLD AUTO: 3.66 10*3/MM3 (ref 1.7–7)
NEUTROPHILS NFR BLD AUTO: 63.4 % (ref 42.7–76)
NRBC BLD AUTO-RTO: 0 /100 WBC (ref 0–0.2)
PLATELET # BLD AUTO: 286 10*3/MM3 (ref 140–450)
PMV BLD AUTO: 9.1 FL (ref 6–12)
POTASSIUM SERPL-SCNC: 4.1 MMOL/L (ref 3.5–5.2)
PROTHROMBIN TIME: 11.9 SECONDS (ref 11.8–14.8)
RBC # BLD AUTO: 5 10*6/MM3 (ref 3.77–5.28)
SODIUM SERPL-SCNC: 141 MMOL/L (ref 136–145)
WBC NRBC COR # BLD: 5.77 10*3/MM3 (ref 3.4–10.8)

## 2023-10-09 PROCEDURE — 93005 ELECTROCARDIOGRAM TRACING: CPT | Performed by: STUDENT IN AN ORGANIZED HEALTH CARE EDUCATION/TRAINING PROGRAM

## 2023-10-09 PROCEDURE — C1894 INTRO/SHEATH, NON-LASER: HCPCS | Performed by: STUDENT IN AN ORGANIZED HEALTH CARE EDUCATION/TRAINING PROGRAM

## 2023-10-09 PROCEDURE — 85610 PROTHROMBIN TIME: CPT | Performed by: STUDENT IN AN ORGANIZED HEALTH CARE EDUCATION/TRAINING PROGRAM

## 2023-10-09 PROCEDURE — C1732 CATH, EP, DIAG/ABL, 3D/VECT: HCPCS | Performed by: STUDENT IN AN ORGANIZED HEALTH CARE EDUCATION/TRAINING PROGRAM

## 2023-10-09 PROCEDURE — C1760 CLOSURE DEV, VASC: HCPCS | Performed by: STUDENT IN AN ORGANIZED HEALTH CARE EDUCATION/TRAINING PROGRAM

## 2023-10-09 PROCEDURE — 93623 PRGRMD STIMJ&PACG IV RX NFS: CPT | Performed by: STUDENT IN AN ORGANIZED HEALTH CARE EDUCATION/TRAINING PROGRAM

## 2023-10-09 PROCEDURE — 80048 BASIC METABOLIC PNL TOTAL CA: CPT | Performed by: STUDENT IN AN ORGANIZED HEALTH CARE EDUCATION/TRAINING PROGRAM

## 2023-10-09 PROCEDURE — 93620 COMP EP EVL R AT VEN PAC&REC: CPT | Performed by: STUDENT IN AN ORGANIZED HEALTH CARE EDUCATION/TRAINING PROGRAM

## 2023-10-09 PROCEDURE — 93621 COMP EP EVL L PAC&REC C SINS: CPT | Performed by: STUDENT IN AN ORGANIZED HEALTH CARE EDUCATION/TRAINING PROGRAM

## 2023-10-09 PROCEDURE — C1730 CATH, EP, 19 OR FEW ELECT: HCPCS | Performed by: STUDENT IN AN ORGANIZED HEALTH CARE EDUCATION/TRAINING PROGRAM

## 2023-10-09 PROCEDURE — 25010000002 PROPOFOL 10 MG/ML EMULSION: Performed by: NURSE ANESTHETIST, CERTIFIED REGISTERED

## 2023-10-09 PROCEDURE — 25010000002 VASOPRESSIN 20 UNIT/ML SOLUTION: Performed by: NURSE ANESTHETIST, CERTIFIED REGISTERED

## 2023-10-09 PROCEDURE — 25810000003 SODIUM CHLORIDE 0.9 % SOLUTION 250 ML FLEX CONT: Performed by: NURSE ANESTHETIST, CERTIFIED REGISTERED

## 2023-10-09 PROCEDURE — 85025 COMPLETE CBC W/AUTO DIFF WBC: CPT | Performed by: STUDENT IN AN ORGANIZED HEALTH CARE EDUCATION/TRAINING PROGRAM

## 2023-10-09 RX ORDER — FLECAINIDE ACETATE 50 MG/1
50 TABLET ORAL 2 TIMES DAILY
Qty: 60 TABLET | Refills: 11 | Status: SHIPPED | OUTPATIENT
Start: 2023-10-09 | End: 2023-10-15 | Stop reason: HOSPADM

## 2023-10-09 RX ORDER — ADALIMUMAB 40MG/0.4ML
KIT SUBCUTANEOUS
COMMUNITY
Start: 2023-09-27

## 2023-10-09 RX ORDER — SODIUM CHLORIDE 0.9 % (FLUSH) 0.9 %
10 SYRINGE (ML) INJECTION EVERY 12 HOURS SCHEDULED
Status: DISCONTINUED | OUTPATIENT
Start: 2023-10-09 | End: 2023-10-09 | Stop reason: HOSPADM

## 2023-10-09 RX ORDER — SODIUM CHLORIDE 9 MG/ML
40 INJECTION, SOLUTION INTRAVENOUS AS NEEDED
Status: DISCONTINUED | OUTPATIENT
Start: 2023-10-09 | End: 2023-10-09 | Stop reason: HOSPADM

## 2023-10-09 RX ORDER — SODIUM CHLORIDE 9 MG/ML
INJECTION, SOLUTION INTRAVENOUS CONTINUOUS PRN
Status: DISCONTINUED | OUTPATIENT
Start: 2023-10-09 | End: 2023-10-09 | Stop reason: SURG

## 2023-10-09 RX ORDER — SODIUM CHLORIDE 0.9 % (FLUSH) 0.9 %
10 SYRINGE (ML) INJECTION AS NEEDED
Status: DISCONTINUED | OUTPATIENT
Start: 2023-10-09 | End: 2023-10-09 | Stop reason: HOSPADM

## 2023-10-09 RX ORDER — LIDOCAINE HYDROCHLORIDE 20 MG/ML
INJECTION, SOLUTION INFILTRATION; PERINEURAL
Status: DISCONTINUED | OUTPATIENT
Start: 2023-10-09 | End: 2023-10-09 | Stop reason: HOSPADM

## 2023-10-09 RX ADMIN — ISOPROTERENOL HYDROCHLORIDE 2 MCG/MIN: 0.2 INJECTION, SOLUTION INTRACARDIAC; INTRAMUSCULAR; INTRAVENOUS; SUBCUTANEOUS at 11:42

## 2023-10-09 RX ADMIN — SODIUM CHLORIDE: 900 INJECTION INTRAVENOUS at 10:20

## 2023-10-09 RX ADMIN — PROPOFOL 125 MCG/KG/MIN: 10 INJECTION, EMULSION INTRAVENOUS at 10:37

## 2023-10-09 NOTE — INTERVAL H&P NOTE
H&P reviewed. The patient was examined and there are no changes to the H&P.      Since the time of the last clinic visit, denies significant change in symptoms.  Denies missing any doses of her medications.  No new ER visits or hospitalizations.    Exam:  Unchanged from last clinic visit.    Labs:  Reviewed.    Assessment/plan:  Ana Small is a 58 y.o. female who presents to the hospital for outpatient EP study and ablation for SVT.  There are no apparent contraindications to proceeding and she is medically appropriate for outpatient management with this procedure.      I have discussed risks, benefits, and alternatives of an electrophysiology study and ablation for supraventricular tachycardia with the patient.  Alternatives discussed include continued observation and medical management.  An electrophysiology study with ablation is an inherently high risk procedure with possible complications that include but are not limited to vascular access complications, internal bleeding, tamponade requiring pericardiocentesis or cardiac surgery, stroke, MI, embolism, myocardial injury, injury to the normal conduction system, and ultimately death.  We also discussed that given the uncertain nature of the diagnosis, therapeutic efficacy can be variable.  Possibilities of recurrent arrhythmias and the possible need for additional procedures and/or medical therapy was discussed. Questions asked were appropriately answered.  No guarantees were made or implied.     Despite this, they would still like to proceed.    Plan:  - Proceed with EP study and ablation

## 2023-10-09 NOTE — LETTER
October 9, 2023     Patient: Ana Small   YOB: 1965   Date of Visit: 10/09/2023       To Whom It May Concern:    It is my medical opinion that Ana Small may return to work Thursday 10/12/23.           Sincerely,        Kavya May RN    CC:

## 2023-10-09 NOTE — ANESTHESIA PREPROCEDURE EVALUATION
Anesthesia Evaluation     history of anesthetic complications:  PONV  NPO Solid Status: > 8 hours  NPO Liquid Status: > 8 hours           Airway   Mallampati: II  No difficulty expected  Dental      Pulmonary    (+) ,sleep apnea on CPAP  Cardiovascular     (+) hypertension, dysrhythmias Tachycardia      Neuro/Psych  (+) CVA  GI/Hepatic/Renal/Endo    (+) morbid obesity, hiatal hernia, GERD, diabetes mellitus, thyroid problem     Musculoskeletal     Abdominal    Substance History      OB/GYN          Other                    Anesthesia Plan    ASA 3     MAC     intravenous induction     Anesthetic plan, risks, benefits, and alternatives have been provided, discussed and informed consent has been obtained with: patient.    CODE STATUS:

## 2023-10-09 NOTE — ANESTHESIA POSTPROCEDURE EVALUATION
"Patient: Ana Small    Procedure Summary       Date: 10/09/23 Room / Location: [facility]ATH [facility] 4 /  [facility] CATH INVASIVE LOCATION    Anesthesia Start: 1028 Anesthesia Stop: 1240    Procedure: EP/CRM Study, SVT (25996) Diagnosis:       Sustained SVT      (SVT (48175))    Providers: Emilia Elizondo MD Provider: BETZAIDA Stout CRNA    Anesthesia Type: MAC ASA Status: 3            Anesthesia Type: MAC    Vitals  Vitals Value Taken Time   /86 10/09/23 1301   Temp     Pulse 99 10/09/23 1311   Resp 14 10/09/23 1300   SpO2 97 % 10/09/23 1311   Vitals shown include unfiled device data.        Post Anesthesia Care and Evaluation    Patient location during evaluation: PACU  Patient participation: complete - patient participated  Level of consciousness: awake and alert  Pain management: adequate    Airway patency: patent  Anesthetic complications: No anesthetic complications    Cardiovascular status: acceptable  Respiratory status: acceptable  Hydration status: acceptable    Comments: Blood pressure 103/86, pulse 104, temperature 97 øF (36.1 øC), temperature source Temporal, resp. rate 14, height 160 cm (63\"), weight 118 kg (261 lb 1.9 oz), SpO2 97%, not currently breastfeeding.    Pt discharged from PACU based on yanira score >8    "

## 2023-10-10 LAB
QT INTERVAL: 388 MS
QTC INTERVAL: 469 MS

## 2023-10-14 ENCOUNTER — HOSPITAL ENCOUNTER (INPATIENT)
Facility: HOSPITAL | Age: 58
LOS: 1 days | Discharge: HOME | End: 2023-10-15
Attending: EMERGENCY MEDICINE | Admitting: FAMILY MEDICINE
Payer: COMMERCIAL

## 2023-10-14 ENCOUNTER — APPOINTMENT (OUTPATIENT)
Dept: ULTRASOUND IMAGING | Facility: HOSPITAL | Age: 58
End: 2023-10-14
Payer: COMMERCIAL

## 2023-10-14 ENCOUNTER — APPOINTMENT (OUTPATIENT)
Dept: CT IMAGING | Facility: HOSPITAL | Age: 58
End: 2023-10-14
Payer: COMMERCIAL

## 2023-10-14 ENCOUNTER — APPOINTMENT (OUTPATIENT)
Dept: GENERAL RADIOLOGY | Facility: HOSPITAL | Age: 58
End: 2023-10-14
Payer: COMMERCIAL

## 2023-10-14 DIAGNOSIS — I26.94 MULTIPLE SUBSEGMENTAL PULMONARY EMBOLI WITHOUT ACUTE COR PULMONALE: Primary | ICD-10-CM

## 2023-10-14 DIAGNOSIS — E03.9 HYPOTHYROIDISM, UNSPECIFIED TYPE: ICD-10-CM

## 2023-10-14 DIAGNOSIS — R79.89 TROPONIN I ABOVE REFERENCE RANGE: ICD-10-CM

## 2023-10-14 DIAGNOSIS — I10 PRIMARY HYPERTENSION: ICD-10-CM

## 2023-10-14 PROBLEM — I47.10 SUSTAINED SVT: Chronic | Status: ACTIVE | Noted: 2017-03-18

## 2023-10-14 PROBLEM — R55 SYNCOPE AND COLLAPSE: Status: RESOLVED | Noted: 2017-03-19 | Resolved: 2023-10-14

## 2023-10-14 PROBLEM — K21.9 ACID REFLUX: Chronic | Status: ACTIVE | Noted: 2018-05-22

## 2023-10-14 PROBLEM — F17.200 CURRENT SMOKER ON SOME DAYS: Status: RESOLVED | Noted: 2018-09-28 | Resolved: 2023-10-14

## 2023-10-14 PROBLEM — I26.99 PULMONARY EMBOLUS: Status: ACTIVE | Noted: 2023-10-14

## 2023-10-14 PROBLEM — E83.52 HYPERCALCEMIA: Status: RESOLVED | Noted: 2018-07-27 | Resolved: 2023-10-14

## 2023-10-14 PROBLEM — G47.33 OSA ON CPAP: Chronic | Status: ACTIVE | Noted: 2023-03-14

## 2023-10-14 PROBLEM — G40.909 SEIZURE DISORDER: Chronic | Status: ACTIVE | Noted: 2017-04-28

## 2023-10-14 PROBLEM — E66.01 MORBID OBESITY DUE TO EXCESS CALORIES: Chronic | Status: ACTIVE | Noted: 2017-04-28

## 2023-10-14 PROBLEM — R11.2 INTRACTABLE NAUSEA AND VOMITING: Status: RESOLVED | Noted: 2021-06-30 | Resolved: 2023-10-14

## 2023-10-14 LAB
ALBUMIN SERPL-MCNC: 4.2 G/DL (ref 3.5–5.2)
ALBUMIN/GLOB SERPL: 1.7 G/DL
ALP SERPL-CCNC: 112 U/L (ref 39–117)
ALT SERPL W P-5'-P-CCNC: 40 U/L (ref 1–33)
ANION GAP SERPL CALCULATED.3IONS-SCNC: 13 MMOL/L (ref 5–15)
AST SERPL-CCNC: 25 U/L (ref 1–32)
AT III PPP CHRO-ACNC: 115 % (ref 84–123)
BASOPHILS # BLD AUTO: 0.03 10*3/MM3 (ref 0–0.2)
BASOPHILS NFR BLD AUTO: 0.4 % (ref 0–1.5)
BILIRUB SERPL-MCNC: 0.4 MG/DL (ref 0–1.2)
BUN SERPL-MCNC: 12 MG/DL (ref 8–23)
BUN/CREAT SERPL: 11.1 (ref 7–25)
CALCIUM SPEC-SCNC: 8.7 MG/DL (ref 8.6–10.5)
CHLORIDE SERPL-SCNC: 102 MMOL/L (ref 98–107)
CO2 SERPL-SCNC: 25 MMOL/L (ref 22–29)
CREAT SERPL-MCNC: 1.08 MG/DL (ref 0.57–1)
D DIMER PPP FEU-MCNC: 3.31 MCGFEU/ML (ref 0–0.6)
DEPRECATED RDW RBC AUTO: 49.1 FL (ref 37–54)
EGFRCR SERPLBLD CKD-EPI 2021: 58.9 ML/MIN/1.73
EOSINOPHIL # BLD AUTO: 0.38 10*3/MM3 (ref 0–0.4)
EOSINOPHIL NFR BLD AUTO: 5.3 % (ref 0.3–6.2)
ERYTHROCYTE [DISTWIDTH] IN BLOOD BY AUTOMATED COUNT: 14.6 % (ref 12.3–15.4)
GEN 5 2HR TROPONIN T REFLEX: 9 NG/L
GLOBULIN UR ELPH-MCNC: 2.5 GM/DL
GLUCOSE SERPL-MCNC: 146 MG/DL (ref 65–99)
HCT VFR BLD AUTO: 45.2 % (ref 34–46.6)
HGB BLD-MCNC: 14.6 G/DL (ref 12–15.9)
HOLD SPECIMEN: NORMAL
HOLD SPECIMEN: NORMAL
IMM GRANULOCYTES # BLD AUTO: 0.05 10*3/MM3 (ref 0–0.05)
IMM GRANULOCYTES NFR BLD AUTO: 0.7 % (ref 0–0.5)
LIPASE SERPL-CCNC: 35 U/L (ref 13–60)
LYMPHOCYTES # BLD AUTO: 1.15 10*3/MM3 (ref 0.7–3.1)
LYMPHOCYTES NFR BLD AUTO: 16.1 % (ref 19.6–45.3)
MCH RBC QN AUTO: 29.7 PG (ref 26.6–33)
MCHC RBC AUTO-ENTMCNC: 32.3 G/DL (ref 31.5–35.7)
MCV RBC AUTO: 91.9 FL (ref 79–97)
MONOCYTES # BLD AUTO: 0.56 10*3/MM3 (ref 0.1–0.9)
MONOCYTES NFR BLD AUTO: 7.8 % (ref 5–12)
NEUTROPHILS NFR BLD AUTO: 4.98 10*3/MM3 (ref 1.7–7)
NEUTROPHILS NFR BLD AUTO: 69.7 % (ref 42.7–76)
NRBC BLD AUTO-RTO: 0 /100 WBC (ref 0–0.2)
NT-PROBNP SERPL-MCNC: 83.2 PG/ML (ref 0–900)
PLATELET # BLD AUTO: 281 10*3/MM3 (ref 140–450)
PMV BLD AUTO: 9.3 FL (ref 6–12)
POTASSIUM SERPL-SCNC: 4.1 MMOL/L (ref 3.5–5.2)
PROT SERPL-MCNC: 6.7 G/DL (ref 6–8.5)
RBC # BLD AUTO: 4.92 10*6/MM3 (ref 3.77–5.28)
SODIUM SERPL-SCNC: 140 MMOL/L (ref 136–145)
T4 FREE SERPL-MCNC: 0.36 NG/DL (ref 0.93–1.7)
TROPONIN T DELTA: -1 NG/L
TROPONIN T SERPL HS-MCNC: 10 NG/L
TSH SERPL DL<=0.05 MIU/L-ACNC: 81.47 UIU/ML (ref 0.27–4.2)
WBC NRBC COR # BLD: 7.15 10*3/MM3 (ref 3.4–10.8)
WHOLE BLOOD HOLD COAG: NORMAL
WHOLE BLOOD HOLD SPECIMEN: NORMAL

## 2023-10-14 PROCEDURE — 94799 UNLISTED PULMONARY SVC/PX: CPT

## 2023-10-14 PROCEDURE — 86147 CARDIOLIPIN ANTIBODY EA IG: CPT | Performed by: INTERNAL MEDICINE

## 2023-10-14 PROCEDURE — 80050 GENERAL HEALTH PANEL: CPT | Performed by: EMERGENCY MEDICINE

## 2023-10-14 PROCEDURE — 81241 F5 GENE: CPT | Performed by: INTERNAL MEDICINE

## 2023-10-14 PROCEDURE — 84484 ASSAY OF TROPONIN QUANT: CPT | Performed by: EMERGENCY MEDICINE

## 2023-10-14 PROCEDURE — 85613 RUSSELL VIPER VENOM DILUTED: CPT | Performed by: INTERNAL MEDICINE

## 2023-10-14 PROCEDURE — 85732 THROMBOPLASTIN TIME PARTIAL: CPT | Performed by: INTERNAL MEDICINE

## 2023-10-14 PROCEDURE — 85705 THROMBOPLASTIN INHIBITION: CPT | Performed by: INTERNAL MEDICINE

## 2023-10-14 PROCEDURE — 81240 F2 GENE: CPT | Performed by: INTERNAL MEDICINE

## 2023-10-14 PROCEDURE — 85305 CLOT INHIBIT PROT S TOTAL: CPT | Performed by: INTERNAL MEDICINE

## 2023-10-14 PROCEDURE — 84439 ASSAY OF FREE THYROXINE: CPT | Performed by: EMERGENCY MEDICINE

## 2023-10-14 PROCEDURE — 25010000002 ENOXAPARIN PER 10 MG: Performed by: INTERNAL MEDICINE

## 2023-10-14 PROCEDURE — 36415 COLL VENOUS BLD VENIPUNCTURE: CPT

## 2023-10-14 PROCEDURE — 85302 CLOT INHIBIT PROT C ANTIGEN: CPT | Performed by: INTERNAL MEDICINE

## 2023-10-14 PROCEDURE — 85670 THROMBIN TIME PLASMA: CPT | Performed by: INTERNAL MEDICINE

## 2023-10-14 PROCEDURE — 93970 EXTREMITY STUDY: CPT | Performed by: SURGERY

## 2023-10-14 PROCEDURE — 93005 ELECTROCARDIOGRAM TRACING: CPT | Performed by: EMERGENCY MEDICINE

## 2023-10-14 PROCEDURE — 25010000002 ENOXAPARIN PER 10 MG: Performed by: EMERGENCY MEDICINE

## 2023-10-14 PROCEDURE — 83690 ASSAY OF LIPASE: CPT | Performed by: EMERGENCY MEDICINE

## 2023-10-14 PROCEDURE — 93970 EXTREMITY STUDY: CPT

## 2023-10-14 PROCEDURE — 71045 X-RAY EXAM CHEST 1 VIEW: CPT

## 2023-10-14 PROCEDURE — 85379 FIBRIN DEGRADATION QUANT: CPT | Performed by: EMERGENCY MEDICINE

## 2023-10-14 PROCEDURE — 85300 ANTITHROMBIN III ACTIVITY: CPT | Performed by: INTERNAL MEDICINE

## 2023-10-14 PROCEDURE — 83880 ASSAY OF NATRIURETIC PEPTIDE: CPT | Performed by: EMERGENCY MEDICINE

## 2023-10-14 PROCEDURE — 99285 EMERGENCY DEPT VISIT HI MDM: CPT

## 2023-10-14 PROCEDURE — 99222 1ST HOSP IP/OBS MODERATE 55: CPT | Performed by: NURSE PRACTITIONER

## 2023-10-14 PROCEDURE — 71275 CT ANGIOGRAPHY CHEST: CPT

## 2023-10-14 PROCEDURE — 86038 ANTINUCLEAR ANTIBODIES: CPT | Performed by: INTERNAL MEDICINE

## 2023-10-14 PROCEDURE — 93005 ELECTROCARDIOGRAM TRACING: CPT

## 2023-10-14 PROCEDURE — 25010000002 ONDANSETRON PER 1 MG: Performed by: EMERGENCY MEDICINE

## 2023-10-14 PROCEDURE — 94640 AIRWAY INHALATION TREATMENT: CPT

## 2023-10-14 PROCEDURE — 25510000001 IOPAMIDOL PER 1 ML: Performed by: EMERGENCY MEDICINE

## 2023-10-14 PROCEDURE — 93010 ELECTROCARDIOGRAM REPORT: CPT | Performed by: INTERNAL MEDICINE

## 2023-10-14 PROCEDURE — 25810000003 SODIUM CHLORIDE 0.9 % SOLUTION: Performed by: INTERNAL MEDICINE

## 2023-10-14 RX ORDER — ESCITALOPRAM OXALATE 10 MG/1
5 TABLET ORAL DAILY
Status: DISCONTINUED | OUTPATIENT
Start: 2023-10-14 | End: 2023-10-15 | Stop reason: HOSPADM

## 2023-10-14 RX ORDER — ACETAMINOPHEN 325 MG/1
650 TABLET ORAL EVERY 6 HOURS PRN
Status: DISCONTINUED | OUTPATIENT
Start: 2023-10-14 | End: 2023-10-15 | Stop reason: HOSPADM

## 2023-10-14 RX ORDER — METOCLOPRAMIDE 5 MG/1
5 TABLET ORAL
Status: DISCONTINUED | OUTPATIENT
Start: 2023-10-14 | End: 2023-10-15 | Stop reason: HOSPADM

## 2023-10-14 RX ORDER — FLECAINIDE ACETATE 50 MG/1
50 TABLET ORAL 2 TIMES DAILY
Status: DISCONTINUED | OUTPATIENT
Start: 2023-10-14 | End: 2023-10-14

## 2023-10-14 RX ORDER — LEVOTHYROXINE SODIUM 0.12 MG/1
125 TABLET ORAL
Status: DISCONTINUED | OUTPATIENT
Start: 2023-10-15 | End: 2023-10-15 | Stop reason: HOSPADM

## 2023-10-14 RX ORDER — ENOXAPARIN SODIUM 150 MG/ML
1 INJECTION SUBCUTANEOUS ONCE
Status: COMPLETED | OUTPATIENT
Start: 2023-10-14 | End: 2023-10-14

## 2023-10-14 RX ORDER — IPRATROPIUM BROMIDE AND ALBUTEROL SULFATE 2.5; .5 MG/3ML; MG/3ML
3 SOLUTION RESPIRATORY (INHALATION)
Status: DISCONTINUED | OUTPATIENT
Start: 2023-10-14 | End: 2023-10-15 | Stop reason: HOSPADM

## 2023-10-14 RX ORDER — BUDESONIDE 0.5 MG/2ML
0.5 INHALANT ORAL
Status: DISCONTINUED | OUTPATIENT
Start: 2023-10-14 | End: 2023-10-15 | Stop reason: HOSPADM

## 2023-10-14 RX ORDER — LEVOTHYROXINE SODIUM 0.15 MG/1
150 TABLET ORAL ONCE
Status: COMPLETED | OUTPATIENT
Start: 2023-10-14 | End: 2023-10-14

## 2023-10-14 RX ORDER — ENALAPRILAT 1.25 MG/ML
1.25 INJECTION INTRAVENOUS ONCE
Status: COMPLETED | OUTPATIENT
Start: 2023-10-14 | End: 2023-10-14

## 2023-10-14 RX ORDER — NIFEDIPINE 10 MG/1
10 CAPSULE ORAL ONCE
Status: COMPLETED | OUTPATIENT
Start: 2023-10-14 | End: 2023-10-14

## 2023-10-14 RX ORDER — ENOXAPARIN SODIUM 150 MG/ML
1 INJECTION SUBCUTANEOUS EVERY 12 HOURS
Status: DISCONTINUED | OUTPATIENT
Start: 2023-10-14 | End: 2023-10-15 | Stop reason: HOSPADM

## 2023-10-14 RX ORDER — SODIUM CHLORIDE 0.9 % (FLUSH) 0.9 %
10 SYRINGE (ML) INJECTION AS NEEDED
Status: DISCONTINUED | OUTPATIENT
Start: 2023-10-14 | End: 2023-10-15 | Stop reason: HOSPADM

## 2023-10-14 RX ORDER — BUSPIRONE HYDROCHLORIDE 5 MG/1
5 TABLET ORAL 3 TIMES DAILY
Status: DISCONTINUED | OUTPATIENT
Start: 2023-10-14 | End: 2023-10-15 | Stop reason: HOSPADM

## 2023-10-14 RX ORDER — ASPIRIN 81 MG/1
324 TABLET, CHEWABLE ORAL ONCE
Status: COMPLETED | OUTPATIENT
Start: 2023-10-14 | End: 2023-10-14

## 2023-10-14 RX ORDER — ONDANSETRON 2 MG/ML
4 INJECTION INTRAMUSCULAR; INTRAVENOUS ONCE
Status: COMPLETED | OUTPATIENT
Start: 2023-10-14 | End: 2023-10-14

## 2023-10-14 RX ORDER — ATORVASTATIN CALCIUM 10 MG/1
10 TABLET, FILM COATED ORAL NIGHTLY
Status: DISCONTINUED | OUTPATIENT
Start: 2023-10-14 | End: 2023-10-15 | Stop reason: HOSPADM

## 2023-10-14 RX ADMIN — IPRATROPIUM BROMIDE AND ALBUTEROL SULFATE 3 ML: .5; 3 SOLUTION RESPIRATORY (INHALATION) at 20:07

## 2023-10-14 RX ADMIN — IOPAMIDOL 100 ML: 755 INJECTION, SOLUTION INTRAVENOUS at 09:45

## 2023-10-14 RX ADMIN — BUSPIRONE HYDROCHLORIDE 5 MG: 5 TABLET ORAL at 21:00

## 2023-10-14 RX ADMIN — NIFEDIPINE 10 MG: 10 CAPSULE ORAL at 10:22

## 2023-10-14 RX ADMIN — SODIUM CHLORIDE 250 ML: 9 INJECTION, SOLUTION INTRAVENOUS at 12:04

## 2023-10-14 RX ADMIN — ONDANSETRON 4 MG: 2 INJECTION INTRAMUSCULAR; INTRAVENOUS at 09:01

## 2023-10-14 RX ADMIN — IPRATROPIUM BROMIDE AND ALBUTEROL SULFATE 3 ML: .5; 3 SOLUTION RESPIRATORY (INHALATION) at 13:12

## 2023-10-14 RX ADMIN — ASPIRIN 324 MG: 81 TABLET, CHEWABLE ORAL at 08:24

## 2023-10-14 RX ADMIN — LEVOTHYROXINE SODIUM 150 MCG: 150 TABLET ORAL at 10:11

## 2023-10-14 RX ADMIN — METOCLOPRAMIDE 5 MG: 5 TABLET ORAL at 16:40

## 2023-10-14 RX ADMIN — METOCLOPRAMIDE 5 MG: 5 TABLET ORAL at 21:00

## 2023-10-14 RX ADMIN — ENOXAPARIN SODIUM 120 MG: 150 INJECTION SUBCUTANEOUS at 10:18

## 2023-10-14 RX ADMIN — ATORVASTATIN CALCIUM 10 MG: 10 TABLET, FILM COATED ORAL at 21:00

## 2023-10-14 RX ADMIN — METOCLOPRAMIDE 5 MG: 5 TABLET ORAL at 12:41

## 2023-10-14 RX ADMIN — BUDESONIDE 0.5 MG: 0.5 INHALANT RESPIRATORY (INHALATION) at 20:05

## 2023-10-14 RX ADMIN — ENALAPRILAT 1.25 MG: 2.5 INJECTION INTRAVENOUS at 09:01

## 2023-10-14 RX ADMIN — BUDESONIDE 0.5 MG: 0.5 INHALANT RESPIRATORY (INHALATION) at 13:06

## 2023-10-14 RX ADMIN — ESCITSLOPRAM 5 MG: 10 TABLET ORAL at 12:41

## 2023-10-14 RX ADMIN — BUSPIRONE HYDROCHLORIDE 5 MG: 5 TABLET ORAL at 16:40

## 2023-10-14 RX ADMIN — ENOXAPARIN SODIUM 120 MG: 150 INJECTION SUBCUTANEOUS at 21:00

## 2023-10-14 NOTE — PROGRESS NOTES
"Pharmacy Dosing Service  Anticoagulant  Enoxaparin    Assessment/Action/Plan:  Pharmacy to Dose Lovenox for PE.  Labs reviewed.  Renal function reviewed.  BMI noted.  Initiated Lovenox 1 mg/kg q 12hrs.  Pharmacy will continue to follow and adjust as needed.     Subjective:  Ana Small is a 58 y.o. female on Enoxaparin 1 mg/kg SQ every 12 hours for indication of PE.  Objective:  [Ht: 160 cm (63\"); Wt: 119 kg (263 lb); BMI: Body mass index is 46.59 kg/m².]  Estimated Creatinine Clearance: 69.1 mL/min (A) (by C-G formula based on SCr of 1.08 mg/dL (H)).   Lab Results   Component Value Date    DDIMER 1.79 (H) 07/04/2020    DDIMER 1.11 (H) 03/18/2017      Lab Results   Component Value Date    INR 0.86 (L) 10/09/2023    INR 1.04 01/31/2023    INR 0.96 01/27/2023    PROTIME 11.9 10/09/2023    PROTIME 13.7 01/31/2023    PROTIME 12.8 01/27/2023      Lab Results   Component Value Date    HGB 14.6 10/14/2023    HGB 14.5 10/09/2023    HGB 12.4 01/31/2023      Lab Results   Component Value Date     10/14/2023     10/09/2023     01/31/2023       RODERICK Izquierdo RPH  10/14/23 12:10 CDT     "

## 2023-10-14 NOTE — CONSULTS
[facility]ART [facility] CONSULT NOTE    Referring Provider: Dr. Cornelio Setinberg    Reason for Consultation: Symptoms R/T Flecainide     Chief complaint:   Chief Complaint   Patient presents with    Chest Pain    Shortness of Breath    Vomiting    Nausea         History of present illness:  Ana Small is a 58 y.o. morbidly obese female with history of SVT who recently underwent EP study on 10/09/2023 by Dr. Elizondo. No inducible SVT with or without high-dose isoproterenol. She was discharged home on flecainide 50 mg BID. Since starting the medication, she has noticed increase dizziness, shortness of breath and chest pain. These symptoms prompted presentation to the ER for further evaluation.     Work-up in the ER, HS troponin were essentially normal without delta, along with normal proBNP. Her D-dimer was elevated, therefore, CTA chest was completed. Patient found to have pulmonary emboli in RLL with low clot burden. Chemistry and CBC were unremarkable. Patient has PMHx thyroid cancer - S/P total thyroidectomy. Her TSH was 81.470 with free T4 0.36, demonstrating severe hypothyroidism.     Patient was started on full-dose lovenox and admitted to the hospitalist service. Cardiology consulted for recommendations of flecainide with patient symptom complaints.       History  Past Medical History:   Diagnosis Date    Abnormal ECG 12/2022    Arrhythmia 2016    SVT.  Has cardiac ablation 2016 or 2017    Asthma 2020    Cancer 2017    Thyroid    COPD (chronic obstructive pulmonary disease) 2020    Diabetes mellitus     Disease of thyroid gland     PARATHYROID TUMOR REMOVED    Heart murmur 2012    PCP in [place] mentioned it's never been mentioned again.    Hiatal hernia     Hyperlipidemia     Hypertension     Migraines     MARINO on CPAP 3/14/2023    Pneumonia     PONV (postoperative nausea and vomiting)     Pulmonary embolus 10/14/2023    Seizures 2010, 2016    X 3 TOTAL    Stroke     APHASIA, SLIGHT WEAKNESS ON  RIGHT SIDE (INTENSIFIED WITH FATIGUE)    SVT (supraventricular tachycardia)     Tachycardia    ,   Past Surgical History:   Procedure Laterality Date    ABLATION OF DYSRHYTHMIC FOCUS   or 17    BREAST BIOPSY Right 2017    Procedure: RIGHT BREAST EXCISIONAL BIOPSY;  Surgeon: Rdaha Moseley MD;  Location:  [facility] OR;  Service:     CARDIAC ABLATION  2017    CARDIAC ELECTROPHYSIOLOGY PROCEDURE N/A 2023    Procedure: Pacemaker DC new  for irreversible sinus node dysfunction;  Surgeon: Harry Cerrato MD;  Location:  [facility] CATH INVASIVE LOCATION;  Service: Cardiology;  Laterality: N/A;    CARDIAC ELECTROPHYSIOLOGY PROCEDURE N/A 2023    Procedure: Pacemaker DC new;  Surgeon: Harry Cerrato MD;  Location:  [facility] CATH INVASIVE LOCATION;  Service: Cardiology;  Laterality: N/A;    CARDIAC ELECTROPHYSIOLOGY PROCEDURE N/A 10/9/2023    Procedure: EP/CRM Study, SVT (96864);  Surgeon: Emilia Elizondo MD;  Location:  [facility] CATH INVASIVE LOCATION;  Service: Cardiovascular;  Laterality: N/A;     SECTION      HEMANGIOMA EXCISION      from liver.     HERNIA REPAIR      INSERT / REPLACE / REMOVE PACEMAKER      LIVER RESECTION      Giant hemangioma    PARATHYROID GLAND SURGERY      PILONIDAL CYSTECTOMY      TONSILLECTOMY     ,   Family History   Problem Relation Age of Onset    Breast cancer Maternal Aunt     Cancer Mother         LIVER    Diabetes Mother     Heart disease Mother     Anemia Mother         Pernicious anemia - took B-12 shots    Hypertension Mother         Uncontrolled    Arrhythmia Paternal Grandfather         Some type of congenital arrhythmia    Heart attack Paternal Grandfather         Caused by some congenital arrhythmia   ,   Social History     Tobacco Use    Smoking status: Former     Packs/day: 2.00     Years: 15.00     Additional pack years: 0.00     Total pack years: 30.00     Types: Cigarettes     Quit date: 2016     Years since quittin.1    Smokeless tobacco: Never     Tobacco comments:     Off/on since age 27. Quit for years at a time.   Vaping Use    Vaping Use: Never used   Substance Use Topics    Alcohol use: No    Drug use: No   ,     Medications  Current Facility-Administered Medications   Medication Dose Route Frequency Provider Last Rate Last Admin    acetaminophen (TYLENOL) tablet 650 mg  650 mg Oral Q6H PRN Cornelio Steinberg MD        atorvastatin (LIPITOR) tablet 10 mg  10 mg Oral Nightly Cornelio Steinberg MD        budesonide (PULMICORT) nebulizer solution 0.5 mg  0.5 mg Nebulization BID - RT Cornelio Steinberg MD        busPIRone (BUSPAR) tablet 5 mg  5 mg Oral TID Cornelio Steinberg MD        Enoxaparin Sodium (LOVENOX) syringe 120 mg  1 mg/kg Subcutaneous Q12H Cornelio Steinberg MD        escitalopram (LEXAPRO) tablet 5 mg  5 mg Oral Daily Cornelio Steinberg MD   5 mg at 10/14/23 1241    ipratropium-albuterol (DUO-NEB) nebulizer solution 3 mL  3 mL Nebulization 4x Daily - RT Cornelio Steinberg MD        [START ON 10/15/2023] levothyroxine (SYNTHROID, LEVOTHROID) tablet 125 mcg  125 mcg Oral Q AM Cornelio Steinberg MD        metoclopramide (REGLAN) tablet 5 mg  5 mg Oral 4x Daily AC & at Bedtime Cornelio Steinberg MD   5 mg at 10/14/23 1241    Pharmacy to Dose enoxaparin (LOVENOX)   Does not apply Continuous PRN Cornelio Steinberg MD        sodium chloride 0.9 % flush 10 mL  10 mL Intravenous PRN Cornelio Steinberg MD         Facility-Administered Medications Ordered in Other Encounters   Medication Dose Route Frequency Provider Last Rate Last Admin    ondansetron (ZOFRAN) injection 2 mg  2 mg Intravenous Once Harry Cerrato MD           Allergies:  Latex and Sulfa antibiotics    REVIEW OF SYSTEMS:  Constitutional: Denies fever or chills. Denies change in energy level or malaise. Patient stated she has multiple family members with history of blood clots. Unsure if any blood clotting  "disorder history.   HEENT: Denies headaches or visual disturbances. Denies difficulty swallowing or sore throat.  Respiratory: Denies cough or hoarseness.Shortness of breath the day after starting flecainide.   Cardiovascular: Chest pain that started the day after starting flecainide. Denies palpitations. Denies presyncope/syncope. Denies orthopnea/PND. Denies lower extremity edema.   Gastrointestinal: Denies abdominal pain. Denies nausea/vomiting. Denies change in bowel habits or history of recent GI tract blood loss.   Genitourinary: Denies urinary urgency or frequency. Denies dysuria, hematuria.  Musculoskeletal: Denies pain or swelling in joints.  Neurological: Denies paresthesias. Denies headache. Denies seizure or stroke symptoms. Dizziness that started the day after starting flecainide.   Behavioral/Psych: Denies problems with anxiety or depression.      All other systems are negative except where stated above.        Objective     Physical Exam:    /72 (BP Location: Right arm, Patient Position: Sitting)   Pulse 59   Temp 97.7 °F (36.5 °C) (Oral)   Resp 18   Ht 160 cm (63\")   Wt 119 kg (263 lb)   SpO2 97%   BMI 46.59 kg/m²        General Appearance:    Alert, cooperative, in no acute distress   HEENT:    Normocephalic. Atraumatic. SALTY.   Neck:   No adenopathy, supple, trachea midline, no thyromegaly, no carotid bruit, no JVD   Lungs:     Clear to auscultation, respirations regular, even and unlabored    Heart:    Regular rhythm and normal rate, normal S1 and S2, no murmur, no gallop, no rub, no click   Abdomen:     Normal bowel sounds, obese, no masses, no organomegaly, soft non-tender, non-distended, no guarding, no rebound tenderness   Extremities:   Moves all extremities, no edema, no cyanosis, no redness   Pulses:   Pulses palpable and equal bilaterally   Skin:   No bleeding, bruising or rash   Lymph nodes:   No palpable adenopathy   Neurologic:   Grossly intact             Results " Review:   I reviewed the patient's new clinical results.    Lab Results (last 72 hours)       Procedure Component Value Units Date/Time    High Sensitivity Troponin T 2Hr [[id number]]  (Normal) Collected: 10/14/23 0958    Specimen: Blood Updated: 10/14/23 1024     HS Troponin T 9 ng/L      Troponin T Delta -1 ng/L     Narrative:      High Sensitive Troponin T Reference Range:  <10.0 ng/L- Negative Female for AMI  <15.0 ng/L- Negative Male for AMI  >=10 - Abnormal Female indicating possible myocardial injury.  >=15 - Abnormal Male indicating possible myocardial injury.   Clinicians would have to utilize clinical acumen, EKG, Troponin, and serial changes to determine if it is an Acute Myocardial Infarction or myocardial injury due to an underlying chronic condition.         T4, Free [[id number]]  (Abnormal) Collected: 10/14/23 0821    Specimen: Blood Updated: 10/14/23 0933     Free T4 0.36 ng/dL     Narrative:      Results may be falsely increased if patient taking Biotin.      TSH [[id number]]  (Abnormal) Collected: 10/14/23 0821    Specimen: Blood Updated: 10/14/23 0932     TSH 81.470 uIU/mL     [place] Draw [[id number]] Collected: 10/14/23 0821    Specimen: Blood Updated: 10/14/23 0930    Narrative:      The following orders were created for panel order [place] Draw.  Procedure                               Abnormality         Status                     ---------                               -----------         ------                     Green Top (Gel)[[id number]]                                  Final result               Lavender Top[[id number]]                                     Final result               Red Top[[id number]]                                          Final result               Light Blue Top[[id number]]                                   Final result                 Please view results for these tests on the individual orders.    Green Top (Gel) [[id number]] Collected: 10/14/23 0821    Specimen: Blood  Updated: 10/14/23 0930     Extra Tube Hold for add-ons.     Comment: Auto resulted.       Lavender Top [[id number]] Collected: 10/14/23 0821    Specimen: Blood Updated: 10/14/23 0930     Extra Tube hold for add-on     Comment: Auto resulted       Red Top [[id number]] Collected: 10/14/23 0821    Specimen: Blood Updated: 10/14/23 0930     Extra Tube Hold for add-ons.     Comment: Auto resulted.       Light Blue Top [[id number]] Collected: 10/14/23 0821    Specimen: Blood Updated: 10/14/23 0930     Extra Tube Hold for add-ons.     Comment: Auto resulted       BNP [[id number]]  (Normal) Collected: 10/14/23 0821    Specimen: Blood Updated: 10/14/23 0906     proBNP 83.2 pg/mL     Narrative:      This assay is used as an aid in the diagnosis of individuals suspected of having heart failure. It can be used as an aid in the diagnosis of acute decompensated heart failure (ADHF) in patients presenting with signs and symptoms of ADHF to the emergency department (ED). In addition, NT-proBNP of <300 pg/mL indicates ADHF is not likely.    Age Range Result Interpretation  NT-proBNP Concentration (pg/mL:      <50             Positive            >450                   Gray                 300-450                    Negative             <300    50-75           Positive            >900                  Gray                300-900                  Negative            <300      >75             Positive            >1800                  Gray                300-1800                  Negative            <300    D-dimer, Quantitative [[id number]]  (Abnormal) Collected: 10/14/23 0821    Specimen: Blood Updated: 10/14/23 0902     D-Dimer, Quantitative 3.31 MCGFEU/mL     Narrative:      According to the assay 's published package insert, a normal (<0.50 MCGFEU/mL) D-dimer result in conjunction with a non-high clinical probability assessment, excludes deep vein thrombosis (DVT) and pulmonary embolism (PE) with high sensitivity.    D-dimer  "values increase with age and this can make VTE exclusion of an older population difficult. To address this, the [employer], based on best available evidence and recent guidelines, recommends that clinicians use age-adjusted D-dimer thresholds in patients greater than 50 years of age with: a) a low probability of PE who do not meet all Pulmonary Embolism Rule Out Criteria, or b) in those with intermediate probability of PE.   The formula for an age-adjusted D-dimer cut-off is \"age/100\".  For example, a 60 year old patient would have an age-adjusted cut-off of 0.60 MCGFEU/mL and an 80 year old 0.80 MCGFEU/mL.    Lipase [[id number]]  (Normal) Collected: 10/14/23 0821    Specimen: Blood Updated: 10/14/23 0902     Lipase 35 U/L     Comprehensive Metabolic Panel [[id number]]  (Abnormal) Collected: 10/14/23 0821    Specimen: Blood Updated: 10/14/23 0848     Glucose 146 mg/dL      BUN 12 mg/dL      Creatinine 1.08 mg/dL      Sodium 140 mmol/L      Potassium 4.1 mmol/L      Chloride 102 mmol/L      CO2 25.0 mmol/L      Calcium 8.7 mg/dL      Total Protein 6.7 g/dL      Albumin 4.2 g/dL      ALT (SGPT) 40 U/L      AST (SGOT) 25 U/L      Alkaline Phosphatase 112 U/L      Total Bilirubin 0.4 mg/dL      Globulin 2.5 gm/dL      A/G Ratio 1.7 g/dL      BUN/Creatinine Ratio 11.1     Anion Gap 13.0 mmol/L      eGFR 58.9 mL/min/1.73     Narrative:      GFR Normal >60  Chronic Kidney Disease <60  Kidney Failure <15      High Sensitivity Troponin T [[id number]]  (Abnormal) Collected: 10/14/23 0821    Specimen: Blood Updated: 10/14/23 0845     HS Troponin T 10 ng/L     Narrative:      High Sensitive Troponin T Reference Range:  <10.0 ng/L- Negative Female for AMI  <15.0 ng/L- Negative Male for AMI  >=10 - Abnormal Female indicating possible myocardial injury.  >=15 - Abnormal Male indicating possible myocardial injury.   Clinicians would have to utilize clinical acumen, EKG, Troponin, and serial changes to determine if " it is an Acute Myocardial Infarction or myocardial injury due to an underlying chronic condition.         CBC & Differential [[id number]]  (Abnormal) Collected: 10/14/23 0821    Specimen: Blood Updated: 10/14/23 0829    Narrative:      The following orders were created for panel order CBC & Differential.  Procedure                               Abnormality         Status                     ---------                               -----------         ------                     CBC Auto Differential[[id number]]        Abnormal            Final result                 Please view results for these tests on the individual orders.    CBC Auto Differential [[id number]]  (Abnormal) Collected: 10/14/23 0821    Specimen: Blood Updated: 10/14/23 0829     WBC 7.15 10*3/mm3      RBC 4.92 10*6/mm3      Hemoglobin 14.6 g/dL      Hematocrit 45.2 %      MCV 91.9 fL      MCH 29.7 pg      MCHC 32.3 g/dL      RDW 14.6 %      RDW-SD 49.1 fl      MPV 9.3 fL      Platelets 281 10*3/mm3      Neutrophil % 69.7 %      Lymphocyte % 16.1 %      Monocyte % 7.8 %      Eosinophil % 5.3 %      Basophil % 0.4 %      Immature Grans % 0.7 %      Neutrophils, Absolute 4.98 10*3/mm3      Lymphocytes, Absolute 1.15 10*3/mm3      Monocytes, Absolute 0.56 10*3/mm3      Eosinophils, Absolute 0.38 10*3/mm3      Basophils, Absolute 0.03 10*3/mm3      Immature Grans, Absolute 0.05 10*3/mm3      nRBC 0.0 /100 WBC             Imaging Results (Last 72 Hours)       Procedure Component Value Units Date/Time    CT Angiogram Chest [[id number]] Collected: 10/14/23 0947     Updated: 10/14/23 0957    Narrative:      EXAMINATION: CT ANGIOGRAM CHEST-      10/14/2023 9:28 AM CDT     HISTORY: Status post electrophysiological study. Vomiting and shortness  of breath. History of third-degree heart block.     In order to have a CT radiation dose as low as reasonably achievable  Automated Exposure Control was utilized for adjustment of the mA and/or  KV according to patient  size.     DLP in mGycm= 550.        CT Angiogram Chest with IV contrast.  CT angiography protocol.  CT imaging with bolus IV contrast injection.  Under concurrent supervision axial, sagittal, coronal,  three-dimensional, and MIP data sets were constructed on an independent  work station.     Comparison is made with a chest CTA exam from 11/11/2022.     Heart size is normal.  Normal size thoracic aorta.  No aneurysm or dissection.     Normal size and well-opacified pulmonary arteries.  Small pulmonary emboli are present within lower lobe branches of the  RIGHT pulmonary artery.  Clot burden is low.  There is no RIGHT heart strain.     Regarding the recent heart procedure there is no pericardial thickening  or fluid.  There is a normal CT appearance of the esophagus.  No mediastinal air or fluid to indicate esophageal inflammation or  perforation.     Fully expanded lungs are essentially clear.  There is mild chronic atelectasis or scarring within the lingula and  adjacent LEFT lower lobe.  No pneumonia, pneumothorax, or pleural effusion.     Surgical clips within the upper abdomen related to liver resection.       Impression:      1. A few RIGHT lower lobe pulmonary emboli are present.  Low clot burden.  No RIGHT heart strain.  2. No cardiac wall injury or esophageal perforation.                                         This report was signed and finalized on 10/14/2023 9:54 AM CDT by Dr. Luis Miguel Melendez MD.       XR Chest 1 View [[id number]] Collected: 10/14/23 0925     Updated: 10/14/23 0929    Narrative:      EXAMINATION: XR CHEST 1 VW-     10/14/2023 8:37 AM CDT     HISTORY: Chest pain.     COMPARISON:  1/30/2023 1 view chest x-ray.     1 view chest x-ray.     Stable heart size.  Aortic arch calcification.  2-lead LEFT cardiac pacer.     Fully expanded lungs.     No pneumonia, pneumothorax, or congestive failure.       Impression:      1. No focal acute lung disease.     This report was signed and finalized on  10/14/2023 9:26 AM CDT by Dr. Luis Miguel Melendez MD.             2D Echocardiogram (01/31/2023):    Left ventricular systolic function is normal. Left ventricular ejection fraction appears to be 61 - 65%.    Left ventricular wall thickness is consistent with mild concentric hypertrophy.    Left ventricular diastolic function was normal.    Estimated right ventricular systolic pressure from tricuspid regurgitation is normal (<35 mmHg).           Assessment & Plan       Pulmonary embolus, RLL without heart strain - Noted on CTA chest. Venous duplex of BLE ordered. Patient has been started on full-dose lovenox and coag panel has been collected with patient stating family history of multiple clots. She may need hematology consult.     Hx Sustained SVT - S/P ablation on 05/18/2017 by Dr. Krueger at [facility]. She was seen in the office by Dr. Elizondo on 09/12/2023 - pacer interrogation demonstrated findings compatible with sustained SVT consistent with mid RP tachycardia.  Patient had EP study by Dr. Elizondo on 10/09/2023 with no inducible SVT. Patient was started on flecainide 50 mg BID after procedure. She stated that she started having symptoms of dizziness, dyspnea and chest pain the following day. Will discontinue flecainide. Continue telemetry monitoring.     Hx thyroid cancer - S/P total thyroidectomy - TSH 81.470, free T4 0.36. On levothyroxine 125 mcg daily. Defer management to attending.    Acid reflux - on PPI    MARINO on CPAP - noted    Morbid obesity due to excess calories - complicates care              With patient symptoms of dizziness, dyspnea, and chest pain starting after she started flecainide; will discontinue medication and monitor. She does have PE, RLL with low clot burden that could be cause of her symptoms. Patient has been started on full-dose lovenox and venous duplex of BLE ordered. Coag panel has also been ordered.         Further orders per Dr. Payne.     Thank you for asking us to follow this  patient with you. Please note that this documentation resulted in a face-to-face encounter provided by me.       TAMMIE Hammer

## 2023-10-14 NOTE — ED PROVIDER NOTES
Subjective   History of Present Illness  Patient is a 58-year-old lady who came the ER complaint of multiple complaints chest discomfort in the substernal area some shortness of breath and also nausea and vomiting.  This patient has got a history of cardiac ablation many years ago later on down the road she developed third-degree heart block for which a pacemaker was placed.  She has been doing okay but has been having episodes of SVT for which she was seen by the EP attending in our hospital and EP study was performed on 912.  After catheterization the patient SVT was not inducible even with high-dose isoproterenol therefore ablation was not performed.  And the procedure was aborted.  Today she comes in with some nonspecific chest pain and discomfort.  Although the patient did not have ablation done but the risk of perforation into the esophagus cannot be ruled out especially with the fact the patient having nausea and vomiting and pain she does not have any odynophagia or dysphagia or drooling.  Does not appear toxic she is hypertensive to some extent.    Chest Pain  Pain location:  Substernal area  Pain quality: aching and tightness    Pain radiates to:  Does not radiate  Pain severity:  Mild  Onset quality:  Gradual  Duration:  2 days  Timing:  Intermittent  Progression:  Worsening  Chronicity:  New  Context: not breathing, not drug use, not eating, not lifting, not raising an arm, not at rest and not stress    Relieved by:  Nothing  Worsened by:  Nothing  Ineffective treatments:  None tried  Associated symptoms: nausea, shortness of breath and vomiting    Associated symptoms: no abdominal pain, no AICD problem, no anorexia, no anxiety, no back pain, no diaphoresis, no dizziness, no dysphagia, no fatigue, no fever, no headache, no numbness, no orthopnea, no palpitations, no PND and no weakness    Risk factors: hypertension    Risk factors: no aortic disease, no birth control, no diabetes mellitus, no  Demian-Danlos syndrome, no immobilization and not male    Shortness of Breath  Associated symptoms: chest pain and vomiting    Associated symptoms: no abdominal pain, no diaphoresis, no ear pain, no fever, no headaches, no PND, no rash and no sore throat    Vomiting  The primary symptoms include nausea and vomiting. Primary symptoms do not include fever, fatigue, abdominal pain, diarrhea, dysuria, myalgias, arthralgias or rash.   The illness does not include chills, anorexia or back pain.   Nausea  The primary symptoms include nausea and vomiting. Primary symptoms do not include fever, fatigue, abdominal pain, diarrhea, dysuria, myalgias, arthralgias or rash.   The illness does not include chills, anorexia or back pain.       Review of Systems   Constitutional: Negative.  Negative for activity change, appetite change, chills, diaphoresis, fatigue and fever.   HENT:  Negative for congestion, drooling, ear pain, facial swelling, hearing loss, sinus pressure, sore throat and trouble swallowing.    Eyes: Negative.  Negative for discharge.   Respiratory:  Positive for shortness of breath.    Cardiovascular:  Positive for chest pain. Negative for palpitations, orthopnea and PND.   Gastrointestinal:  Positive for nausea and vomiting. Negative for abdominal distention, abdominal pain, anorexia, blood in stool and diarrhea.   Endocrine: Negative.  Negative for cold intolerance, heat intolerance, polydipsia, polyphagia and polyuria.   Genitourinary: Negative.  Negative for dysuria, flank pain and urgency.   Musculoskeletal: Negative.  Negative for arthralgias, back pain, myalgias and neck stiffness.   Skin: Negative.  Negative for color change, pallor and rash.   Allergic/Immunologic: Negative.    Neurological: Negative.  Negative for dizziness, seizures, speech difficulty, weakness, numbness and headaches.   Hematological: Negative.  Negative for adenopathy.   All other systems reviewed and are negative.      Past Medical  History:   Diagnosis Date    Abnormal ECG 2022    Arrhythmia 2016    SVT.  Has cardiac ablation  or 2017    Asthma 2020    Cancer 2017    Thyroid    COPD (chronic obstructive pulmonary disease) 2020    Diabetes mellitus     Disease of thyroid gland     PARATHYROID TUMOR REMOVED    Heart murmur     PCP in [place] mentioned it's never been mentioned again.    Hiatal hernia     Hyperlipidemia     Hypertension     Migraines     MARINO on CPAP 3/14/2023    Pneumonia     PONV (postoperative nausea and vomiting)     Pulmonary embolus 10/14/2023    Seizures 2010, 2016    X 3 TOTAL    Stroke     APHASIA, SLIGHT WEAKNESS ON RIGHT SIDE (INTENSIFIED WITH FATIGUE)    SVT (supraventricular tachycardia)     Tachycardia        Allergies   Allergen Reactions    Latex Anaphylaxis     CALLED TO [facility] IN SURGERY SCHEDULING.    Sulfa Antibiotics Anaphylaxis       Past Surgical History:   Procedure Laterality Date    ABLATION OF DYSRHYTHMIC FOCUS   or 17    BREAST BIOPSY Right 2017    Procedure: RIGHT BREAST EXCISIONAL BIOPSY;  Surgeon: Radha Moseley MD;  Location:  [facility] OR;  Service:     CARDIAC ABLATION  2017    CARDIAC ELECTROPHYSIOLOGY PROCEDURE N/A 2023    Procedure: Pacemaker DC new  for irreversible sinus node dysfunction;  Surgeon: Harry Cerrato MD;  Location:  [facility] CATH INVASIVE LOCATION;  Service: Cardiology;  Laterality: N/A;    CARDIAC ELECTROPHYSIOLOGY PROCEDURE N/A 2023    Procedure: Pacemaker DC new;  Surgeon: Harry Cerrato MD;  Location:  [facility] CATH INVASIVE LOC[facility];  Service: Cardiology;  Laterality: N/A;    CARDIAC ELECTROPHYSIOLOGY PROCEDURE N/A 10/9/2023    Procedure: EP/CRM Study, SVT (54862);  Surgeon: Emilia Elizondo MD;  Location:  [facility] CATH INVASIVE LOCATION;  Service: Cardiovascular;  Laterality: N/A;     SECTION      HEMANGIOMA EXCISION      from liver.     HERNIA REPAIR      INSERT / REPLACE / REMOVE PACEMAKER      LIVER RESECTION      Giant hemangioma     PARATHYROID GLAND SURGERY      PILONIDAL CYSTECTOMY      TONSILLECTOMY         Family History   Problem Relation Age of Onset    Breast cancer Maternal Aunt     Cancer Mother         LIVER    Diabetes Mother     Heart disease Mother     Anemia Mother         Pernicious anemia - took B-12 shots    Hypertension Mother         Uncontrolled    Arrhythmia Paternal Grandfather         Some type of congenital arrhythmia    Heart attack Paternal Grandfather         Caused by some congenital arrhythmia       Social History     Socioeconomic History    Marital status:     Number of children: 2   Tobacco Use    Smoking status: Former     Packs/day: 2.00     Years: 15.00     Additional pack years: 0.00     Total pack years: 30.00     Types: Cigarettes     Quit date: 2016     Years since quittin.1    Smokeless tobacco: Never    Tobacco comments:     Off/on since age 27. Quit for years at a time.   Vaping Use    Vaping Use: Never used   Substance and Sexual Activity    Alcohol use: No    Drug use: No    Sexual activity: Yes     Partners: Male     Birth control/protection: Surgical, Post-menopausal, Bilateral salpingectomy            Objective   Physical Exam  Vitals and nursing note reviewed. Exam conducted with a chaperone present.   Constitutional:       General: She is not in acute distress.     Appearance: Normal appearance. She is well-developed. She is not toxic-appearing or diaphoretic.   HENT:      Head: Normocephalic and atraumatic.      Right Ear: External ear normal.      Nose: Nose normal.      Mouth/Throat:      Mouth: Mucous membranes are moist.   Eyes:      Conjunctiva/sclera: Conjunctivae normal.      Pupils: Pupils are equal, round, and reactive to light.   Cardiovascular:      Rate and Rhythm: Normal rate and regular rhythm.      Chest Wall: PMI is not displaced.      Pulses: Normal pulses. No decreased pulses.      Heart sounds: Normal heart sounds. No murmur heard.     No systolic murmur is  present.      No diastolic murmur is present.      No S3 sounds.   Pulmonary:      Effort: Pulmonary effort is normal. No tachypnea, accessory muscle usage or respiratory distress.      Breath sounds: Normal breath sounds. No stridor. No decreased breath sounds, wheezing, rhonchi or rales.   Chest:      Chest wall: No tenderness or crepitus.   Abdominal:      General: Bowel sounds are normal. There is no distension.      Palpations: Abdomen is soft.      Tenderness: There is no abdominal tenderness.   Musculoskeletal:         General: No swelling or tenderness. Normal range of motion.      Cervical back: Normal range of motion and neck supple. No rigidity.      Right lower leg: No edema.      Left lower leg: No edema.      Comments: Lower extremity exam bilaterally is unremarkable.  There is no right or left calf tenderness .  There is no palpable venous cord.  No obvious difference in the size of the legs.  No pitting edema.  The dorsalis pedis and posterior tibial femoral and popliteal pulses are palpable and +2 bilaterally.  Homans sign is negative   Skin:     General: Skin is warm.      Capillary Refill: Capillary refill takes less than 2 seconds.      Coloration: Skin is not jaundiced.      Findings: No erythema or rash.   Neurological:      General: No focal deficit present.      Mental Status: She is alert and oriented to person, place, and time. Mental status is at baseline.      Cranial Nerves: No cranial nerve deficit.      Motor: No weakness.      Coordination: Coordination normal.      Deep Tendon Reflexes: Reflexes are normal and symmetric. Reflexes normal.   Psychiatric:         Mood and Affect: Mood normal.         Procedures           ED Course  ED Course as of 10/14/23 1032   Sat Oct 14, 2023   0809 Left ventricular systolic function is normal. Left ventricular ejection fraction appears to be 61 - 65%.  ·  Left ventricular wall thickness is consistent with mild concentric hypertrophy.  ·  Left  ventricular diastolic function was normal.  ·  Estimated right ventricular systolic pressure from tricuspid regurgitation is normal (<35 mmHg).      [TS]   0836 [employer] pacemaker interrogation reveals normal interrogation [TS]   0850 Normal sinus rhythm [TS]   1017 Patient status post EP study in which ablation was not performed since the arrhythmia could not be induced the possibility of perforation although less likely this is an attainable therefore CT angio was performed which does not show any perforation but does show a pulmonary embolus.  Patient has not any cardiac strain on this but her troponin is elevated the troponin elevation could be secondary to her hypothyroidism and mild renal insufficiency but needs to be ruled out she does have low T4 and was given p.o. Synthroid.  The patient will be admitted to medicine service for further evaluation. [TS]      ED Course User Index  [TS] Calos Shaffer MD                                           Medical Decision Making  Differential Diagnosis:  I considered chest wall pain, muscle strain, costochondritis, pleurisy, rib fracture, herpes zoster, cardiovascular etiology, myocardial infarction, intermediate coronary syndrome, unstable angina, angina, aortic dissection, pericarditis, pulmonary etiology, pulmonary embolism, pneumonia, pneumothorax, lung cancer, gastroesophageal reflux disease, esophagitis, esophageal spasm and gastrointestinal etiology as a possible cause of chest pain in this patient. This is a partial list of diagnoses considered.    Patient also could have EP study related complication and therefore a CT of the chest can be obtained.    Problems Addressed:  Hypothyroidism, unspecified type: acute illness or injury     Details: Patient is hypothyroid.  Was given Synthroid.  Multiple subsegmental pulmonary emboli without acute cor pulmonale: acute illness or injury     Details: No heart strain given Lovenox.  Primary hypertension: acute illness or  injury     Details: Was given Procardia for blood pressure control over here in the ED since she is bradycardic have not used labetalol  Troponin I above reference range: acute illness or injury     Details: Troponin is elevated this could be multifactorial could be because of cardiac etiology versus pulmonary emboli versus hypothyroidism.    Amount and/or Complexity of Data Reviewed  Labs: ordered.     Details: Lab work-up was reviewed  Radiology: ordered.     Details: CT scan shows pulmonary emboli  ECG/medicine tests: ordered and independent interpretation performed.     Details: Nonspecific EKG finding  Discussion of management or test interpretation with external provider(s): Discussed with      Risk  Decision regarding hospitalization.  Risk Details: Patient well score 3  Heart score 4  Came in with chest pain has got a pulmonary embolus with cardiac marker elevation will give Lovenox admitted to the medicine service.        Final diagnoses:   Multiple subsegmental pulmonary emboli without acute cor pulmonale   Troponin I above reference range   Primary hypertension   Hypothyroidism, unspecified type       ED Disposition  ED Disposition       ED Disposition   Decision to Admit    Condition   --    Comment   Level of Care: Telemetry [5]   Diagnosis: Pulmonary embolus [[id number]]   Admitting Physician: YAMILET SAUER [[id number]]   Attending Physician: YAMILET SAUER [[id number]]   Certification: I Certify That Inpatient Hospital Services Are Medically Necessary For Greater Than 2 Midnights                 No follow-up provider specified.       Medication List      No changes were made to your prescriptions during this visit.            Calos Shaffer MD  10/14/23 8950

## 2023-10-14 NOTE — PLAN OF CARE
Goal Outcome Evaluation:      Pt came to 4B from the ER at around 1130. She came in with a diagnosis of pulmonary embolus. She had a CTA which showed right lower lobe pulmonary emboli. She is A&Ox4 and on room air. She is up ad tobias. She had an ultrasound of her lower extremities which results are still pending. She also has a pacemaker. She has been normal sinus and a paced 66-77 bpm.

## 2023-10-14 NOTE — H&P
4         [facility]  HISTORY AND PHYSICAL    Date of Admission: 10/14/2023  Primary Care Physician: Camilla Ellison MD    Subjective   Primary Historian: patient     Chief Complaint: sob, cp, dizziness    History of Present Illness  Mass to admit this 58-year-old woman who scented with chest pain, shortness of breath and found hypertensive.  Patient has extensive cardiovascular disease.  Noted also's increase TSH at 8.1 with free T4 of 0.36.  Dr. Shaffer initially concerned of complication of recent cardiac procedure.  He did imaging study that ruled out presence of perforation.  Instead he found small PE and without heart strain.    Hospitalist service requested to facilitate further care.    October 9.  Dr. Elizondo did EP testing with attempted arrhythmia induction using isoproterenol.  He mentioned that there is no inducible tachycardia. Given lack of inducible tachycardia with and without hide to assess for internal fusion as well as no evidence of dual AV alo physiology or accessory pathway presence, decision was made to complete the procedure.     Patient normally follows with Dr. Cerrato primary cardiologist.  Last visit dates back to March 14, 2023.  From his note he indicates that patient had a recent pacer (6 sinus syndrome).  It was at that time when he referred the patient to  because of intermittent palpitation.  He also mentioned history of sleep apnea on CPAP.       Results for orders placed during the hospital encounter of 01/30/23    Adult Transthoracic Echo Complete W/ Cont if Necessary Per Protocol    Interpretation Summary    Left ventricular systolic function is normal. Left ventricular ejection fraction appears to be 61 - 65%.    Left ventricular wall thickness is consistent with mild concentric hypertrophy.    Left ventricular diastolic function was normal.    Estimated right ventricular systolic pressure from tricuspid regurgitation is  "normal (<35 mmHg).    Patient's medication list includes:  flecainide  Metoprolol tartrate  Levothyroxine  Losartan  Metoclopramide  Tiotropium   Zofran  Montelukast  adalimumab    Patient initially came in hypertensive with blood pressure of 166/101.  It went as high as 177/102.  Patient received enalaprilat and nifedipine.  Most recent blood pressure is in the 80s.  Patient complains of dizziness as part of her complaints that she came in the hospital.  Other complaints mainly are shortness of breath, chest pain      She told me she was going to work earlier when she had the chest discomfort described as pleuritic.  It is anteriorly located without radiation.  No palpitation.  She describes it further as sharp and sensation of fullness.  She claimed that the discomfort is \"not horrible\".  On examination she was found with small amount of PE.  She denies any history of prior DVT/VTE.  She denies any leg pain.  She works 12 hours as a  at [employer].  She has strong family history of VTE that affects her sister brother and  mom.  There has not been any studies to check for any genetic or hypercoagulable state in her family.    Patient complained of dizziness described as room spinning.  Reports runny nose and nasal congestion.  But denies any issues in her ears.  She was just started on flecainide and wondering if this has something to do with her symptoms.      Shortness of breath.  She is morbidly obese with BMI of 47.    she has obstructive sleep apnea and uses CPAP which based on conversation after mentioning the  subsequently chuckled. 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  Etiology not clear.  This dates back in .      She is found with significantly elevated TSH with low free T4.  She admits that it is " her fault because she had not taken her thyroid after she ran out for the past 2 to 3 weeks.  She said she could not get it filled.      Review of Systems   Otherwise complete ROS reviewed and negative except as mentioned in the HPI.    Past Medical History:   Past Medical History:   Diagnosis Date    Abnormal ECG 2022    Arrhythmia 2016    SVT.  Has cardiac ablation  or 2017    Asthma 2020    Cancer 2017    Thyroid    COPD (chronic obstructive pulmonary disease) 2020    Diabetes mellitus     Disease of thyroid gland     PARATHYROID TUMOR REMOVED    Heart murmur     PCP in [place] mentioned it's never been mentioned again.    Hiatal hernia     Hyperlipidemia     Hypertension     Migraines     MARINO on CPAP 3/14/2023    Pneumonia     PONV (postoperative nausea and vomiting)     Pulmonary embolus 10/14/2023    Seizures , 2016    X 3 TOTAL    Stroke     APHASIA, SLIGHT WEAKNESS ON RIGHT SIDE (INTENSIFIED WITH FATIGUE)    SVT (supraventricular tachycardia)     Tachycardia      Past Surgical History:  Past Surgical History:   Procedure Laterality Date    ABLATION OF DYSRHYTHMIC FOCUS   or     BREAST BIOPSY Right 2017    Procedure: RIGHT BREAST EXCISIONAL BIOPSY;  Surgeon: Radha Moseley MD;  Location: [facility] OR;  Service:     CARDIAC ABLATION  2017    CARDIAC ELECTROPHYSIOLOGY PROCEDURE N/A 2023    Procedure: Pacemaker DC new  for irreversible sinus node dysfunction;  Surgeon: Harry Cerrato MD;  Location:  [facility];  Service: Cardiology;  Laterality: N/A;    CARDIAC ELECTROPHYSIOLOGY PROCEDURE N/A 2023    Procedure: Pacemaker DC new;  Surgeon: Harry Cerrato MD;  Location:  [facility];  Service: Cardiology;  Laterality: N/A;    CARDIAC ELECTROPHYSIOLOGY PROCEDURE N/A 10/9/2023    Procedure: EP/CRM Study, SVT (63344);  Surgeon: Emilia Elizondo MD;  Location:  [facility];  Service: Cardiovascular;  Laterality: N/A;      SECTION      HEMANGIOMA EXCISION      from liver.     HERNIA REPAIR      INSERT / REPLACE / REMOVE PACEMAKER  2/23    LIVER RESECTION      Giant hemangioma    PARATHYROID GLAND SURGERY      PILONIDAL CYSTECTOMY      TONSILLECTOMY       Social History:  reports that she quit smoking about 7 years ago. Her smoking use included cigarettes. She has a 30.00 pack-year smoking history. She has never used smokeless tobacco. She reports that she does not drink alcohol and does not use drugs.    Family History: family history includes Anemia in her mother; Arrhythmia in her paternal grandfather; Breast cancer in her maternal aunt; Cancer in her mother; Diabetes in her mother; Heart attack in her paternal grandfather; Heart disease in her mother; Hypertension in her mother.       Allergies:  Allergies   Allergen Reactions    Latex Anaphylaxis     CALLED TO [facility] IN SURGERY SCHEDULING.    Sulfa Antibiotics Anaphylaxis       Medications:  Prior to Admission medications    Medication Sig Start Date End Date Taking? Authorizing Provider   acetaminophen (Tylenol) 325 MG tablet Take 2 tablets by mouth Every 6 (Six) Hours As Needed for Mild Pain or Moderate Pain (at pacer site). 2/1/23   Elmira Georges APRN   albuterol sulfate  (90 Base) MCG/ACT inhaler 2 puffs 2 (Two) Times a Day.    ProviderNabil MD   atorvastatin (LIPITOR) 10 MG tablet Take 1 tablet by mouth Every Night.    ProviderNabil MD   busPIRone (BUSPAR) 5 MG tablet Take 1 tablet by mouth 3 (Three) Times a Day.    ProviderNabil MD   escitalopram (LEXAPRO) 5 MG tablet Take 1 tablet by mouth Daily.    ProviderNabil MD   flecainide (TAMBOCOR) 50 MG tablet Take 1 tablet by mouth 2 (Two) Times a Day. 10/9/23   Emilia Elizondo MD   Flovent  MCG/ACT inhaler INHALE 2 PUFFS INTO THE LUNGS TWICE A DAY FOR 90 DAYS 3/3/23   ProviderNabil MD   Humira Pen 40 MG/0.4ML Pen-injector Kit  9/27/23   Nabil Marti MD   levothyroxine  "(SYNTHROID, LEVOTHROID) 125 MCG tablet Take 1 tablet by mouth Daily.    Nabil Marti MD   losartan (COZAAR) 25 MG tablet Take 1 tablet by mouth Daily.    Nabil Marti MD   metoclopramide (REGLAN) 5 MG tablet Take 1 tablet by mouth 4 (Four) Times a Day Before Meals & at Bedtime.    Nabil Marti MD   metoprolol tartrate (LOPRESSOR) 50 MG tablet Take 1 tablet by mouth 2 (Two) Times a Day. 3/14/23   Harry Cerrato MD   montelukast (SINGULAIR) 10 MG tablet Take 1 tablet by mouth Every Night.    Nabil Marti MD   ondansetron ODT (ZOFRAN-ODT) 4 MG disintegrating tablet Place 1 tablet on the tongue 4 (Four) Times a Day As Needed for Nausea or Vomiting. 3/7/22   Isaac Raines MD   tiotropium bromide monohydrate (Spiriva Respimat) 2.5 MCG/ACT aerosol solution inhaler Inhale 2 puffs Daily.    Nabil Marti MD     I have utilized all available immediate resources to obtain, update, or review the patient's current medications (including all prescriptions, over-the-counter products, herbals, cannabis/cannabidiol products, and vitamin/mineral/dietary (nutritional) supplements).    Objective     Vital Signs: /78   Pulse 67   Temp 98.3 °F (36.8 °C)   Resp 18   Ht 160 cm (63\")   Wt 119 kg (263 lb)   SpO2 96%   BMI 46.59 kg/m²   Physical Exam   /Facial skin which she said been going on for several years.  Morbidly obese with BMI of 47  No gross chest wall tenderness.  GEN: Awake, alert, interactive, in NAD  HEENT: Atraumatic, PERRLA, EOMI, Anicteric, Trachea midline  Lungs: CTAB, no wheezing/rales/rhonchi  Heart: RRR, +S1/s2, no rub  ABD: soft, nt/nd, +BS, no guarding/rebound  Extremities: atraumatic, no cyanosis, no edema  Skin: no rashes or lesions  Neuro: AAOx3, no focal deficits  Psych: normal mood & affect      Results Reviewed:  Lab Results (last 24 hours)       Procedure Component Value Units Date/Time    High Sensitivity Troponin T 2Hr [[id number]]  (Normal) Collected: " 10/14/23 0958    Specimen: Blood Updated: 10/14/23 1024     HS Troponin T 9 ng/L      Troponin T Delta -1 ng/L     Narrative:      High Sensitive Troponin T Reference Range:  <10.0 ng/L- Negative Female for AMI  <15.0 ng/L- Negative Male for AMI  >=10 - Abnormal Female indicating possible myocardial injury.  >=15 - Abnormal Male indicating possible myocardial injury.   Clinicians would have to utilize clinical acumen, EKG, Troponin, and serial changes to determine if it is an Acute Myocardial Infarction or myocardial injury due to an underlying chronic condition.         T4, Free [[id number]]  (Abnormal) Collected: 10/14/23 0821    Specimen: Blood Updated: 10/14/23 0933     Free T4 0.36 ng/dL     Narrative:      Results may be falsely increased if patient taking Biotin.      TSH [[id number]]  (Abnormal) Collected: 10/14/23 0821    Specimen: Blood Updated: 10/14/23 0932     TSH 81.470 uIU/mL     [place] Draw [[id number]] Collected: 10/14/23 0821    Specimen: Blood Updated: 10/14/23 0930    Narrative:      The following orders were created for panel order [place] Draw.  Procedure                               Abnormality         Status                     ---------                               -----------         ------                     Green Top (Gel)[[id number]]                                  Final result               Lavender Top[[id number]]                                     Final result               Red Top[[id number]]                                          Final result               Light Blue Top[[id number]]                                   Final result                 Please view results for these tests on the individual orders.    Green Top (Gel) [[id number]] Collected: 10/14/23 0821    Specimen: Blood Updated: 10/14/23 0930     Extra Tube Hold for add-ons.     Comment: Auto resulted.       Lavender Top [[id number]] Collected: 10/14/23 0821    Specimen: Blood Updated: 10/14/23 0930     Extra Tube hold for  add-on     Comment: Auto resulted       Red Top [[id number]] Collected: 10/14/23 0821    Specimen: Blood Updated: 10/14/23 0930     Extra Tube Hold for add-ons.     Comment: Auto resulted.       Light Blue Top [[id number]] Collected: 10/14/23 0821    Specimen: Blood Updated: 10/14/23 0930     Extra Tube Hold for add-ons.     Comment: Auto resulted       BNP [[id number]]  (Normal) Collected: 10/14/23 0821    Specimen: Blood Updated: 10/14/23 0906     proBNP 83.2 pg/mL     Narrative:      This assay is used as an aid in the diagnosis of individuals suspected of having heart failure. It can be used as an aid in the diagnosis of acute decompensated heart failure (ADHF) in patients presenting with signs and symptoms of ADHF to the emergency department (ED). In addition, NT-proBNP of <300 pg/mL indicates ADHF is not likely.    Age Range Result Interpretation  NT-proBNP Concentration (pg/mL:      <50             Positive            >450                   Gray                 300-450                    Negative             <300    50-75           Positive            >900                  Gray                300-900                  Negative            <300      >75             Positive            >1800                  Gray                300-1800                  Negative            <300    D-dimer, Quantitative [[id number]]  (Abnormal) Collected: 10/14/23 0821    Specimen: Blood Updated: 10/14/23 0902     D-Dimer, Quantitative 3.31 MCGFEU/mL     Narrative:      According to the assay 's published package insert, a normal (<0.50 MCGFEU/mL) D-dimer result in conjunction with a non-high clinical probability assessment, excludes deep vein thrombosis (DVT) and pulmonary embolism (PE) with high sensitivity.    D-dimer values increase with age and this can make VTE exclusion of an older population difficult. To address this, the [employer], based on best available evidence and recent guidelines,  "recommends that clinicians use age-adjusted D-dimer thresholds in patients greater than 50 years of age with: a) a low probability of PE who do not meet all Pulmonary Embolism Rule Out Criteria, or b) in those with intermediate probability of PE.   The formula for an age-adjusted D-dimer cut-off is \"age/100\".  For example, a 60 year old patient would have an age-adjusted cut-off of 0.60 MCGFEU/mL and an 80 year old 0.80 MCGFEU/mL.    Lipase [[id number]]  (Normal) Collected: 10/14/23 0821    Specimen: Blood Updated: 10/14/23 0902     Lipase 35 U/L     Comprehensive Metabolic Panel [[id number]]  (Abnormal) Collected: 10/14/23 0821    Specimen: Blood Updated: 10/14/23 0848     Glucose 146 mg/dL      BUN 12 mg/dL      Creatinine 1.08 mg/dL      Sodium 140 mmol/L      Potassium 4.1 mmol/L      Chloride 102 mmol/L      CO2 25.0 mmol/L      Calcium 8.7 mg/dL      Total Protein 6.7 g/dL      Albumin 4.2 g/dL      ALT (SGPT) 40 U/L      AST (SGOT) 25 U/L      Alkaline Phosphatase 112 U/L      Total Bilirubin 0.4 mg/dL      Globulin 2.5 gm/dL      A/G Ratio 1.7 g/dL      BUN/Creatinine Ratio 11.1     Anion Gap 13.0 mmol/L      eGFR 58.9 mL/min/1.73     Narrative:      GFR Normal >60  Chronic Kidney Disease <60  Kidney Failure <15      High Sensitivity Troponin T [[id number]]  (Abnormal) Collected: 10/14/23 0821    Specimen: Blood Updated: 10/14/23 0845     HS Troponin T 10 ng/L     Narrative:      High Sensitive Troponin T Reference Range:  <10.0 ng/L- Negative Female for AMI  <15.0 ng/L- Negative Male for AMI  >=10 - Abnormal Female indicating possible myocardial injury.  >=15 - Abnormal Male indicating possible myocardial injury.   Clinicians would have to utilize clinical acumen, EKG, Troponin, and serial changes to determine if it is an Acute Myocardial Infarction or myocardial injury due to an underlying chronic condition.         CBC & Differential [[id number]]  (Abnormal) Collected: 10/14/23 0821    Specimen: Blood " Updated: 10/14/23 0829    Narrative:      The following orders were created for panel order CBC & Differential.  Procedure                               Abnormality         Status                     ---------                               -----------         ------                     CBC Auto Differential[[id number]]        Abnormal            Final result                 Please view results for these tests on the individual orders.    CBC Auto Differential [[id number]]  (Abnormal) Collected: 10/14/23 0821    Specimen: Blood Updated: 10/14/23 0829     WBC 7.15 10*3/mm3      RBC 4.92 10*6/mm3      Hemoglobin 14.6 g/dL      Hematocrit 45.2 %      MCV 91.9 fL      MCH 29.7 pg      MCHC 32.3 g/dL      RDW 14.6 %      RDW-SD 49.1 fl      MPV 9.3 fL      Platelets 281 10*3/mm3      Neutrophil % 69.7 %      Lymphocyte % 16.1 %      Monocyte % 7.8 %      Eosinophil % 5.3 %      Basophil % 0.4 %      Immature Grans % 0.7 %      Neutrophils, Absolute 4.98 10*3/mm3      Lymphocytes, Absolute 1.15 10*3/mm3      Monocytes, Absolute 0.56 10*3/mm3      Eosinophils, Absolute 0.38 10*3/mm3      Basophils, Absolute 0.03 10*3/mm3      Immature Grans, Absolute 0.05 10*3/mm3      nRBC 0.0 /100 WBC           Imaging Results (Last 24 Hours)       Procedure Component Value Units Date/Time    CT Angiogram Chest [[id number]] Collected: 10/14/23 0947     Updated: 10/14/23 0957    Narrative:      EXAMINATION: CT ANGIOGRAM CHEST-      10/14/2023 9:28 AM CDT     HISTORY: Status post electrophysiological study. Vomiting and shortness  of breath. History of third-degree heart block.     In order to have a CT radiation dose as low as reasonably achievable  Automated Exposure Control was utilized for adjustment of the mA and/or  KV according to patient size.     DLP in mGycm= 550.        CT Angiogram Chest with IV contrast.  CT angiography protocol.  CT imaging with bolus IV contrast injection.  Under concurrent supervision axial, sagittal,  coronal,  three-dimensional, and MIP data sets were constructed on an independent  work station.     Comparison is made with a chest CTA exam from 11/11/2022.     Heart size is normal.  Normal size thoracic aorta.  No aneurysm or dissection.     Normal size and well-opacified pulmonary arteries.  Small pulmonary emboli are present within lower lobe branches of the  RIGHT pulmonary artery.  Clot burden is low.  There is no RIGHT heart strain.     Regarding the recent heart procedure there is no pericardial thickening  or fluid.  There is a normal CT appearance of the esophagus.  No mediastinal air or fluid to indicate esophageal inflammation or  perforation.     Fully expanded lungs are essentially clear.  There is mild chronic atelectasis or scarring within the lingula and  adjacent LEFT lower lobe.  No pneumonia, pneumothorax, or pleural effusion.     Surgical clips within the upper abdomen related to liver resection.       Impression:      1. A few RIGHT lower lobe pulmonary emboli are present.  Low clot burden.  No RIGHT heart strain.  2. No cardiac wall injury or esophageal perforation.                                         This report was signed and finalized on 10/14/2023 9:54 AM CDT by Dr. Luis Miguel Melendez MD.       XR Chest 1 View [[id number]] Collected: 10/14/23 0925     Updated: 10/14/23 0929    Narrative:      EXAMINATION: XR CHEST 1 VW-     10/14/2023 8:37 AM CDT     HISTORY: Chest pain.     COMPARISON:  1/30/2023 1 view chest x-ray.     1 view chest x-ray.     Stable heart size.  Aortic arch calcification.  2-lead LEFT cardiac pacer.     Fully expanded lungs.     No pneumonia, pneumothorax, or congestive failure.       Impression:      1. No focal acute lung disease.     This report was signed and finalized on 10/14/2023 9:26 AM CDT by Dr. Luis Miguel Melendez MD.             I have personally reviewed and interpreted the radiology studies and ECG obtained at time of admission.     Assessment / Plan    Assessment:   Active Hospital Problems    Diagnosis     **Pulmonary embolus            Medical Decision Making  Number and Complexity of problems:   Shortness of breath in the setting of morbid obesity and small amount of PE.  PE no reported evidence of heart strain; no oxygen need.  Patient denies any history of bleeding.  She is past overdue for age-appropriate screening colonoscopy.  We will treat with full dose anticoagulation.  Patient and  agreeable.  Pleurisy secondary to PE  Hypertension in the ER status post treatment with enalaprilat and nifedipine.  Now with low blood pressure in the 80s.  We will bolus  250 cc fluid and monitor.  Hold off on antihypertensive medications from home meds  Dizziness.  Patient been dizzy even before elevated blood pressure.  Supportive care. Consider meclizine.  Grossly nonfocal exam.  Hypothyroidism with elevated TSH (greater than 80) and low TSH due to noncompliance with medication.  Restart medication  Ankylosing spondylitis on Humira.  Strong family history of VTE-hypercoagulable work-up  Recent EP study October 9 with no inducible arrhythmia.  Pacemaker in situ for history of sick sinus syndrome.  Depression on BuSpar and escitalopram.      Treatment plan.  We will check venous ultrasound of lower extremities to check for blood clot presence and or burden of blood clot.  Otherwise treatment will be anticoagulation.    Consult cardiology in the for question in reference to new medication flecainide.  I reviewed the medications adverse effect there are at times some shortness of breath in very minimal percentage.    Discussed about echocardiogram but has low burden of PE.  No significant strain, no significant elevation in troponin either.  No oxygen requirement.  They are agreeable to hold off on echocardiogram    Other plans as per mentioned above and ordered.    Conditions and Status  Fair.  Monitor blood pressure following bolus of fluid.        MDM  Data  External documents reviewed: Reviewed  Cardiac tracing (EKG, telemetry) interpretation: EKG showed normal sinus rhythm, no acute ST-T wave changes.  61.  Radiology interpretation: Reviewed as per interpreted by radiologist  Labs reviewed: Reviewed and correlated in patient's condition  Any tests that were considered but not ordered: None  Decision rules/scores evaluated (example [id number], Wells, etc): None     Discussed with: Patient and .  Nursing staff       Care Planning  Shared decision making: Patient  Code status and discussions: Full code    Disposition  Social Determinants of Health that impact treatment or disposition: None identified at this time  Estimated length of stay is to be determined    I confirmed that the patient's advanced care plan is present, code status is documented, and a surrogate decision maker is listed in the patient's medical record.     The patient's surrogate decision maker is  Galindo    The patient was seen and examined by me on   Electronically signed by Cornelio Steinberg MD, 10/14/23, 11:26 CDT.

## 2023-10-15 VITALS
HEIGHT: 63 IN | DIASTOLIC BLOOD PRESSURE: 89 MMHG | TEMPERATURE: 98.7 F | WEIGHT: 263 LBS | HEART RATE: 103 BPM | OXYGEN SATURATION: 97 % | BODY MASS INDEX: 46.6 KG/M2 | RESPIRATION RATE: 17 BRPM | SYSTOLIC BLOOD PRESSURE: 134 MMHG

## 2023-10-15 LAB
QT INTERVAL: 442 MS
QT INTERVAL: 476 MS
QTC INTERVAL: 459 MS
QTC INTERVAL: 479 MS

## 2023-10-15 PROCEDURE — 94799 UNLISTED PULMONARY SVC/PX: CPT

## 2023-10-15 PROCEDURE — 25010000002 ENOXAPARIN PER 10 MG: Performed by: INTERNAL MEDICINE

## 2023-10-15 PROCEDURE — 99233 SBSQ HOSP IP/OBS HIGH 50: CPT | Performed by: NURSE PRACTITIONER

## 2023-10-15 PROCEDURE — 94664 DEMO&/EVAL PT USE INHALER: CPT

## 2023-10-15 RX ORDER — APIXABAN 5 MG (74)
5 KIT ORAL TAKE AS DIRECTED
Qty: 74 TABLET | Refills: 0 | Status: SHIPPED | OUTPATIENT
Start: 2023-10-15

## 2023-10-15 RX ORDER — LEVOTHYROXINE SODIUM 0.12 MG/1
125 TABLET ORAL
Qty: 30 TABLET | Refills: 0 | Status: SHIPPED | OUTPATIENT
Start: 2023-10-16 | End: 2023-11-15

## 2023-10-15 RX ADMIN — LEVOTHYROXINE SODIUM 125 MCG: 125 TABLET ORAL at 06:34

## 2023-10-15 RX ADMIN — IPRATROPIUM BROMIDE AND ALBUTEROL SULFATE 3 ML: .5; 3 SOLUTION RESPIRATORY (INHALATION) at 11:05

## 2023-10-15 RX ADMIN — ESCITSLOPRAM 5 MG: 10 TABLET ORAL at 08:04

## 2023-10-15 RX ADMIN — ENOXAPARIN SODIUM 120 MG: 150 INJECTION SUBCUTANEOUS at 10:56

## 2023-10-15 RX ADMIN — BUDESONIDE 0.5 MG: 0.5 INHALANT RESPIRATORY (INHALATION) at 06:46

## 2023-10-15 RX ADMIN — BUSPIRONE HYDROCHLORIDE 5 MG: 5 TABLET ORAL at 08:04

## 2023-10-15 RX ADMIN — IPRATROPIUM BROMIDE AND ALBUTEROL SULFATE 3 ML: .5; 3 SOLUTION RESPIRATORY (INHALATION) at 06:40

## 2023-10-15 RX ADMIN — METOCLOPRAMIDE 5 MG: 5 TABLET ORAL at 10:56

## 2023-10-15 RX ADMIN — METOCLOPRAMIDE 5 MG: 5 TABLET ORAL at 08:04

## 2023-10-15 NOTE — DISCHARGE SUMMARY
[facility] Medicine Services  DISCHARGE SUMMARY       Date of Admission: 10/14/2023  Date of Discharge:  10/15/2023  Primary Care Physician: Camilla Ellison MD    Presenting Problem/History of Present Illness:  Chest discomfort    Final Discharge Diagnoses:  Active Hospital Problems    Diagnosis     **Pulmonary embolus     MARINO on CPAP     Acid reflux     Morbid obesity due to excess calories     Seizure disorder     Sustained SVT        Consults: Dr. Payne with cardiology.    Procedures Performed: none.    Pertinent Test Results:   Results for orders placed during the hospital encounter of 01/30/23    Adult Transthoracic Echo Complete W/ Cont if Necessary Per Protocol    Interpretation Summary    Left ventricular systolic function is normal. Left ventricular ejection fraction appears to be 61 - 65%.    Left ventricular wall thickness is consistent with mild concentric hypertrophy.    Left ventricular diastolic function was normal.    Estimated right ventricular systolic pressure from tricuspid regurgitation is normal (<35 mmHg).      Imaging Results (All)       Procedure Component Value Units Date/Time    US Venous Doppler Lower Extremity Bilateral (duplex) [[id number]] Resulted: 10/14/23 1328     Updated: 10/14/23 1347    CT Angiogram Chest [[id number]] Collected: 10/14/23 0947     Updated: 10/14/23 0957    Narrative:      EXAMINATION: CT ANGIOGRAM CHEST-      10/14/2023 9:28 AM CDT     HISTORY: Status post electrophysiological study. Vomiting and shortness  of breath. History of third-degree heart block.     In order to have a CT radiation dose as low as reasonably achievable  Automated Exposure Control was utilized for adjustment of the mA and/or  KV according to patient size.     DLP in mGycm= 550.        CT Angiogram Chest with IV contrast.  CT angiography protocol.  CT imaging with bolus IV contrast injection.  Under concurrent supervision axial, sagittal,  coronal,  three-dimensional, and MIP data sets were constructed on an independent  work station.     Comparison is made with a chest CTA exam from 11/11/2022.     Heart size is normal.  Normal size thoracic aorta.  No aneurysm or dissection.     Normal size and well-opacified pulmonary arteries.  Small pulmonary emboli are present within lower lobe branches of the  RIGHT pulmonary artery.  Clot burden is low.  There is no RIGHT heart strain.     Regarding the recent heart procedure there is no pericardial thickening  or fluid.  There is a normal CT appearance of the esophagus.  No mediastinal air or fluid to indicate esophageal inflammation or  perforation.     Fully expanded lungs are essentially clear.  There is mild chronic atelectasis or scarring within the lingula and  adjacent LEFT lower lobe.  No pneumonia, pneumothorax, or pleural effusion.     Surgical clips within the upper abdomen related to liver resection.       Impression:      1. A few RIGHT lower lobe pulmonary emboli are present.  Low clot burden.  No RIGHT heart strain.  2. No cardiac wall injury or esophageal perforation.                                         This report was signed and finalized on 10/14/2023 9:54 AM CDT by Dr. Luis Miguel Melendez MD.       XR Chest 1 View [[id number]] Collected: 10/14/23 0925     Updated: 10/14/23 0929    Narrative:      EXAMINATION: XR CHEST 1 VW-     10/14/2023 8:37 AM CDT     HISTORY: Chest pain.     COMPARISON:  1/30/2023 1 view chest x-ray.     1 view chest x-ray.     Stable heart size.  Aortic arch calcification.  2-lead LEFT cardiac pacer.     Fully expanded lungs.     No pneumonia, pneumothorax, or congestive failure.       Impression:      1. No focal acute lung disease.     This report was signed and finalized on 10/14/2023 9:26 AM CDT by Dr. Luis Miguel Melendez MD.             LAB RESULTS:      Lab 10/14/23  0821 10/09/23  0811   WBC 7.15 5.77   HEMOGLOBIN 14.6 14.5   HEMATOCRIT 45.2 45.9   PLATELETS 281 286    NEUTROS ABS 4.98 3.66   IMMATURE GRANS (ABS) 0.05 0.03   LYMPHS ABS 1.15 1.28   MONOS ABS 0.56 0.45   EOS ABS 0.38 0.30   MCV 91.9 91.8   PROTIME  --  11.9   D DIMER QUANT 3.31*  --          Lab 10/14/23  0821 10/09/23  0811   SODIUM 140 141   POTASSIUM 4.1 4.1   CHLORIDE 102 102   CO2 25.0 26.0   ANION GAP 13.0 13.0   BUN 12 19   CREATININE 1.08* 1.04*   EGFR 58.9* 61.7   GLUCOSE 146* 150*   CALCIUM 8.7 8.7   TSH 81.470*  --          Lab 10/14/23  0821   TOTAL PROTEIN 6.7   ALBUMIN 4.2   GLOBULIN 2.5   ALT (SGPT) 40*   AST (SGOT) 25   BILIRUBIN 0.4   ALK PHOS 112   LIPASE 35         Lab 10/14/23  0958 10/14/23  0821 10/09/23  0811   PROBNP  --  83.2  --    HSTROP T 9 10*  --    PROTIME  --   --  11.9   INR  --   --  0.86*                 Brief Urine Lab Results       None          Microbiology Results (last 10 days)       ** No results found for the last 240 hours. **            Hospital Course:   Ms. Small is a 58-year-old female who presented to [facility] on 10/14 with chest discomfort in the substernal area with shortness of breath and nausea and vomiting.  She has a history of SVT, sinus node dysfunction and underwent previous SVT ablation with Dr. Krueger in May 2017.  She continued to have episodes where she felt poorly with symptoms lasting all day in duration.  She recently underwent permanent pacemaker implant for sinus node dysfunction. Her device interrogations have shown that she has frequent prolonged episodes where her heart rates are staying around 150 bpm in a sustained manner lasting for several hours in duration.  She was referred to EP and had redo SVT ablation on October 9, 2023.  No inducible SVT with or without high-dose isoproterenol. She was started on flecainide 50 mg twice daily.  Since starting the medication, patient has noticed increased dizziness, shortness of breath and chest pain which prompted her to present to the ED for further evaluation.  In the emergency  department, high-sensitivity troponin 10 with follow-up 9, troponin T delta -1.  BNP normal.  Dimer was elevated which prompted CTA chest.  CTA chest revealed few right lower lobe pulmonary emboli, low clot burden.  No right heart strain.  She was started on therapeutic Lovenox.    Pulmonary embolus, right lower lobe without heart strain noted on CTA chest.  Venous duplex showed no thrombus.  Patient states she has a family history of multiple clots.  Hypercoagulable panel sent off.  She may need hematology consult as outpatient.  She was started on therapeutic Lovenox.  We will transition to Eliquis 5 mg twice daily x7 days followed by 5 mg twice daily.    Cardiology consulted for recommendations of flecainide with patient's symptom complaints.  Cardiology recommends to discontinue flecainide at this time.  Sinus 63-82 on telemetry.  She is okay for discharge from cardiology standpoint with close follow-up with TAMMIE Hansen with cardiology tomorrow.    Blood pressure stable with last reading 134/89.  Recommend to keep a log of blood pressure readings and bring to follow-up appointment with primary care provider.    Hypothyroidism with TSH 81.470, free T4 0.36.  She tells me that she has been out of her medication levothyroxine 125 mcg daily for about 2 weeks.  She said that her  picked up medication but now she has been unable to find bottle.  She tells me that she has not had time to call her primary care provider, Dr. Ellison for another prescription.  I will send in a prescription for levothyroxine 125 mcg daily to [facility] in [place].  Strongly encouraged to take medication as prescribed with no missed doses.  She will need repeat thyroid function test in 4 to 6 weeks as outpatient.    She has reached maximum benefit of hospitalization.  She is medically stable and appropriate for discharge today.    Physical Exam on Discharge:  /89 (BP Location: Right arm, Patient Position: Sitting)   Pulse  "103   Temp 98.7 °F (37.1 °C) (Oral)   Resp 17   Ht 160 cm (63\")   Wt 119 kg (263 lb)   SpO2 97%   BMI 46.59 kg/m²   Physical Exam  Vitals reviewed.   Constitutional:       General: She is not in acute distress.     Appearance: She is morbidly obese. She is not toxic-appearing.      Comments: Sitting up in chair.  No acute distress.  Family at bedside.  Discussed with her nurse pamella   HENT:      Head: Normocephalic and atraumatic.      Mouth/Throat:      Mouth: Mucous membranes are moist.      Pharynx: Oropharynx is clear.   Eyes:      Extraocular Movements: Extraocular movements intact.      Conjunctiva/sclera: Conjunctivae normal.      Pupils: Pupils are equal, round, and reactive to light.   Cardiovascular:      Rate and Rhythm: Normal rate and regular rhythm.      Pulses: Normal pulses.      Comments: Sinus 63-82  Pulmonary:      Effort: Pulmonary effort is normal. No respiratory distress.      Breath sounds: Normal breath sounds.   Abdominal:      General: Bowel sounds are normal. There is no distension.      Palpations: Abdomen is soft.      Tenderness: There is no abdominal tenderness.   Musculoskeletal:         General: No swelling or tenderness. Normal range of motion.      Cervical back: Normal range of motion and neck supple. No muscular tenderness.   Skin:     General: Skin is warm and dry.      Findings: No erythema or rash.   Neurological:      General: No focal deficit present.      Mental Status: She is alert and oriented to person, place, and time.      Cranial Nerves: No cranial nerve deficit.      Motor: No weakness.   Psychiatric:         Mood and Affect: Mood normal.         Behavior: Behavior normal.       Condition on Discharge: medically stable.    Discharge Disposition:  Home or Self Care    Discharge Medications:     Discharge Medications        New Medications        Instructions Start Date   Eliquis DVT/PE Starter Pack tablet therapy pack  Generic drug: Apixaban Starter Pack   5 mg, " Oral, Take As Directed             Changes to Medications        Instructions Start Date   levothyroxine 125 MCG tablet  Commonly known as: SYNTHROID, LEVOTHROID  What changed: when to take this   125 mcg, Oral, Every Early Morning   Start Date: October 16, 2023            Continue These Medications        Instructions Start Date   acetaminophen 325 MG tablet  Commonly known as: Tylenol   650 mg, Oral, Every 6 Hours PRN      albuterol sulfate  (90 Base) MCG/ACT inhaler  Commonly known as: PROVENTIL HFA;VENTOLIN HFA;PROAIR HFA   2 puffs, 2 Times Daily      atorvastatin 10 MG tablet  Commonly known as: LIPITOR   10 mg, Oral, Nightly      busPIRone 5 MG tablet  Commonly known as: BUSPAR   5 mg, Oral, 3 Times Daily      escitalopram 5 MG tablet  Commonly known as: LEXAPRO   5 mg, Oral, Daily      Flovent  MCG/ACT inhaler  Generic drug: fluticasone   INHALE 2 PUFFS INTO THE LUNGS TWICE A DAY FOR 90 DAYS      Humira Pen 40 MG/0.4ML Pen-injector Kit  Generic drug: Adalimumab       losartan 25 MG tablet  Commonly known as: COZAAR   25 mg, Oral, Daily      metoclopramide 5 MG tablet  Commonly known as: REGLAN   5 mg, Oral, 4 Times Daily Before Meals & Nightly      metoprolol tartrate 50 MG tablet  Commonly known as: LOPRESSOR   50 mg, Oral, 2 Times Daily      montelukast 10 MG tablet  Commonly known as: SINGULAIR   10 mg, Oral, Nightly      ondansetron ODT 4 MG disintegrating tablet  Commonly known as: ZOFRAN-ODT   4 mg, Translingual, 4 Times Daily PRN      Spiriva Respimat 2.5 MCG/ACT aerosol solution inhaler  Generic drug: tiotropium bromide monohydrate   2 puffs, Inhalation, Daily - RT             Stop These Medications      flecainide 50 MG tablet  Commonly known as: TAMBOCOR              Discharge Diet:   Diet Instructions       Diet: Regular/House Diet, Cardiac Diets; Healthy Heart (2-3 Na+); Regular Texture (IDDSI 7); Thin (IDDSI 0)      Discharge Diet:  Regular/House Diet  Cardiac Diets       Cardiac  Diet: Healthy Heart (2-3 Na+)    Texture: Regular Texture (IDDSI 7)    Fluid Consistency: Thin (IDDSI 0)            Activity at Discharge:   Activity Instructions       Activity as Tolerated              Follow-up Appointments:    Follow up with Dr. Ellison in one week.  2.    Follow-up with TAMMIE Hansen with cardiology tomorrow.  3.  Follow-up with EP per recommendations.  Future Appointments   Date Time Provider Department Center   11/14/2023  9:15 AM Emilia Elizondo MD [facility] CD PAD PAD   3/14/2024  9:15 AM [facility] PAD DEVICE CHECK [facility] CD PAD PAD       Test Results Pending at Discharge: none.    Electronically signed by TAMMIE Omer, 10/15/23, 11:56 CDT.    Time: 35 minutes.

## 2023-10-15 NOTE — PLAN OF CARE
Goal Outcome Evaluation:   Pt sinus  63 -82 per tele. Pt had no c/o pain. Vss. Pt appeared to have slept throughout most the night. Will continue to monitor.

## 2023-10-15 NOTE — PROGRESS NOTES
[facility] -  Progress Note     LOS: 1 day   Patient Care Team:  Camilla Ellison MD as PCP - General (Family Medicine)  Stefania Mclaughlin APRN as Nurse Practitioner (Nurse Practitioner)    Chief Complaint: F/U Chest Pain, Dizziness, Shortness of breath    Subjective   Resting in bed. Stated her symptoms were improving, but still there when she gets up and moves around.       REVIEW OF SYSTEMS:  Constitutional: Denies fever or chills. Denies change in energy level or malaise.  HEENT: Denies headaches or visual disturbances. Denies difficulty swallowing or sore throat.  Respiratory: Denies cough or hoarseness. Shortness of breath improved, still mild with exertion.   Cardiovascular: Mild chest pain with exertion. Denies palpitations. Denies presyncope/syncope. Denies orthopnea/PND. Denies lower extremity edema.   Gastrointestinal: Denies abdominal pain. Denies nausea/vomiting. Denies change in bowel habits or history of recent GI tract blood loss.   Genitourinary: Denies urinary urgency or frequency. Denies dysuria, hematuria.  Musculoskeletal: Denies pain or swelling in joints.  Neurological: Denies paresthesias. Denies headache. Denies seizure or stroke symptoms. Dizziness with moving around.   Behavioral/Psych: Denies problems with anxiety or depression.    All other systems are negative except where stated above.      Objective     Vital Sign Min/Max for last 24 hours  Temp  Min: 97.7 °F (36.5 °C)  Max: 98.8 °F (37.1 °C)   BP  Min: 95/54  Max: 172/111   Pulse  Min: 59  Max: 85   Resp  Min: 16  Max: 18   SpO2  Min: 94 %  Max: 99 %   No data recorded   No data recorded         10/14/23  0812   Weight: 119 kg (263 lb)         Intake/Output Summary (Last 24 hours) at 10/15/2023 0959  Last data filed at 10/14/2023 1654  Gross per 24 hour   Intake 360 ml   Output --   Net 360 ml         Physical Exam:    General Appearance:    Alert, cooperative, in no acute distress   HEENT:    Normocephalic.  Atraumatic. SALTY.   Lungs:     Clear to auscultation, respirations regular, even and unlabored    Heart:    Regular rhythm and normal rate, normal S1 and S2, no murmur, no gallop, no rub, no click   Abdomen:     Normal bowel sounds, obese, soft non-tender, non-distended   Extremities:   Moves all extremities, no edema, no cyanosis, no redness   Pulses:   Pulses palpable and equal bilaterally   Skin:   No bleeding, bruising or rash   Neurologic:   Grossly intact       Results Review:   Lab Results (last 72 hours)       Procedure Component Value Units Date/Time    Antithrombin III [[id number]]  (Normal) Collected: 10/14/23 1236    Specimen: Blood Updated: 10/14/23 1345     Antithrombin Activity 115 %     Lupus Anticoagulant [[id number]] Collected: 10/14/23 1236    Specimen: Blood Updated: 10/14/23 1302    Protein C Antigen, Total [[id number]] Collected: 10/14/23 1236    Specimen: Blood Updated: 10/14/23 1302    Protein S Antigen, Total [[id number]] Collected: 10/14/23 1236    Specimen: Blood Updated: 10/14/23 1302    STACI [[id number]] Collected: 10/14/23 1236    Specimen: Blood Updated: 10/14/23 1301    Anticardiolipin Antibody, IgG / M, Qn [[id number]] Collected: 10/14/23 1236    Specimen: Blood Updated: 10/14/23 1301    Factor II, DNA Analysis [[id number]] Collected: 10/14/23 1236    Specimen: Blood Updated: 10/14/23 1300    Factor 5 Leiden [[id number]] Collected: 10/14/23 1237    Specimen: Blood Updated: 10/14/23 1300    High Sensitivity Troponin T 2Hr [[id number]]  (Normal) Collected: 10/14/23 0958    Specimen: Blood Updated: 10/14/23 1024     HS Troponin T 9 ng/L      Troponin T Delta -1 ng/L     Narrative:      High Sensitive Troponin T Reference Range:  <10.0 ng/L- Negative Female for AMI  <15.0 ng/L- Negative Male for AMI  >=10 - Abnormal Female indicating possible myocardial injury.  >=15 - Abnormal Male indicating possible myocardial injury.   Clinicians would have to utilize clinical acumen, EKG, Troponin, and serial  changes to determine if it is an Acute Myocardial Infarction or myocardial injury due to an underlying chronic condition.         T4, Free [[id number]]  (Abnormal) Collected: 10/14/23 0821    Specimen: Blood Updated: 10/14/23 0933     Free T4 0.36 ng/dL     Narrative:      Results may be falsely increased if patient taking Biotin.      TSH [[id number]]  (Abnormal) Collected: 10/14/23 0821    Specimen: Blood Updated: 10/14/23 0932     TSH 81.470 uIU/mL     Sharon Draw [[id number]] Collected: 10/14/23 0821    Specimen: Blood Updated: 10/14/23 0930    Narrative:      The following orders were created for panel order Sharon Draw.  Procedure                               Abnormality         Status                     ---------                               -----------         ------                     Green Top (Gel)[[id number]]                                  Final result               Lavender Top[[id number]]                                     Final result               Red Top[[id number]]                                          Final result               Light Blue Top[[id number]]                                   Final result                 Please view results for these tests on the individual orders.    Green Top (Gel) [[id number]] Collected: 10/14/23 0821    Specimen: Blood Updated: 10/14/23 0930     Extra Tube Hold for add-ons.     Comment: Auto resulted.       Lavender Top [[id number]] Collected: 10/14/23 0821    Specimen: Blood Updated: 10/14/23 0930     Extra Tube hold for add-on     Comment: Auto resulted       Red Top [[id number]] Collected: 10/14/23 0821    Specimen: Blood Updated: 10/14/23 0930     Extra Tube Hold for add-ons.     Comment: Auto resulted.       Light Blue Top [[id number]] Collected: 10/14/23 0821    Specimen: Blood Updated: 10/14/23 0930     Extra Tube Hold for add-ons.     Comment: Auto resulted       BNP [[id number]]  (Normal) Collected: 10/14/23 0821    Specimen: Blood Updated: 10/14/23 0906  "    proBNP 83.2 pg/mL     Narrative:      This assay is used as an aid in the diagnosis of individuals suspected of having heart failure. It can be used as an aid in the diagnosis of acute decompensated heart failure (ADHF) in patients presenting with signs and symptoms of ADHF to the emergency department (ED). In addition, NT-proBNP of <300 pg/mL indicates ADHF is not likely.    Age Range Result Interpretation  NT-proBNP Concentration (pg/mL:      <50             Positive            >450                   Gray                 300-450                    Negative             <300    50-75           Positive            >900                  Gray                300-900                  Negative            <300      >75             Positive            >1800                  Gray                300-1800                  Negative            <300    D-dimer, Quantitative [[id number]]  (Abnormal) Collected: 10/14/23 0821    Specimen: Blood Updated: 10/14/23 0902     D-Dimer, Quantitative 3.31 MCGFEU/mL     Narrative:      According to the assay 's published package insert, a normal (<0.50 MCGFEU/mL) D-dimer result in conjunction with a non-high clinical probability assessment, excludes deep vein thrombosis (DVT) and pulmonary embolism (PE) with high sensitivity.    D-dimer values increase with age and this can make VTE exclusion of an older population difficult. To address this, the [employer], based on best available evidence and recent guidelines, recommends that clinicians use age-adjusted D-dimer thresholds in patients greater than 50 years of age with: a) a low probability of PE who do not meet all Pulmonary Embolism Rule Out Criteria, or b) in those with intermediate probability of PE.   The formula for an age-adjusted D-dimer cut-off is \"age/100\".  For example, a 60 year old patient would have an age-adjusted cut-off of 0.60 MCGFEU/mL and an 80 year old 0.80 MCGFEU/mL.    Lipase " [[id number]]  (Normal) Collected: 10/14/23 0821    Specimen: Blood Updated: 10/14/23 0902     Lipase 35 U/L     Comprehensive Metabolic Panel [[id number]]  (Abnormal) Collected: 10/14/23 0821    Specimen: Blood Updated: 10/14/23 0848     Glucose 146 mg/dL      BUN 12 mg/dL      Creatinine 1.08 mg/dL      Sodium 140 mmol/L      Potassium 4.1 mmol/L      Chloride 102 mmol/L      CO2 25.0 mmol/L      Calcium 8.7 mg/dL      Total Protein 6.7 g/dL      Albumin 4.2 g/dL      ALT (SGPT) 40 U/L      AST (SGOT) 25 U/L      Alkaline Phosphatase 112 U/L      Total Bilirubin 0.4 mg/dL      Globulin 2.5 gm/dL      A/G Ratio 1.7 g/dL      BUN/Creatinine Ratio 11.1     Anion Gap 13.0 mmol/L      eGFR 58.9 mL/min/1.73     Narrative:      GFR Normal >60  Chronic Kidney Disease <60  Kidney Failure <15      High Sensitivity Troponin T [[id number]]  (Abnormal) Collected: 10/14/23 0821    Specimen: Blood Updated: 10/14/23 0845     HS Troponin T 10 ng/L     Narrative:      High Sensitive Troponin T Reference Range:  <10.0 ng/L- Negative Female for AMI  <15.0 ng/L- Negative Male for AMI  >=10 - Abnormal Female indicating possible myocardial injury.  >=15 - Abnormal Male indicating possible myocardial injury.   Clinicians would have to utilize clinical acumen, EKG, Troponin, and serial changes to determine if it is an Acute Myocardial Infarction or myocardial injury due to an underlying chronic condition.         CBC & Differential [[id number]]  (Abnormal) Collected: 10/14/23 0821    Specimen: Blood Updated: 10/14/23 0829    Narrative:      The following orders were created for panel order CBC & Differential.  Procedure                               Abnormality         Status                     ---------                               -----------         ------                     CBC Auto Differential[[id number]]        Abnormal            Final result                 Please view results for these tests on the individual orders.    CBC Auto  Differential [[id number]]  (Abnormal) Collected: 10/14/23 0821    Specimen: Blood Updated: 10/14/23 0829     WBC 7.15 10*3/mm3      RBC 4.92 10*6/mm3      Hemoglobin 14.6 g/dL      Hematocrit 45.2 %      MCV 91.9 fL      MCH 29.7 pg      MCHC 32.3 g/dL      RDW 14.6 %      RDW-SD 49.1 fl      MPV 9.3 fL      Platelets 281 10*3/mm3      Neutrophil % 69.7 %      Lymphocyte % 16.1 %      Monocyte % 7.8 %      Eosinophil % 5.3 %      Basophil % 0.4 %      Immature Grans % 0.7 %      Neutrophils, Absolute 4.98 10*3/mm3      Lymphocytes, Absolute 1.15 10*3/mm3      Monocytes, Absolute 0.56 10*3/mm3      Eosinophils, Absolute 0.38 10*3/mm3      Basophils, Absolute 0.03 10*3/mm3      Immature Grans, Absolute 0.05 10*3/mm3      nRBC 0.0 /100 WBC               Medication Review: yes  Current Facility-Administered Medications   Medication Dose Route Frequency Provider Last Rate Last Admin    acetaminophen (TYLENOL) tablet 650 mg  650 mg Oral Q6H PRN Cornelio Steinberg MD        atorvastatin (LIPITOR) tablet 10 mg  10 mg Oral Nightly Cornelio Steinberg MD   10 mg at 10/14/23 2100    budesonide (PULMICORT) nebulizer solution 0.5 mg  0.5 mg Nebulization BID - RT Cornelio Steinberg MD   0.5 mg at 10/15/23 0646    busPIRone (BUSPAR) tablet 5 mg  5 mg Oral TID Cornelio Steinberg MD   5 mg at 10/15/23 0804    Enoxaparin Sodium (LOVENOX) syringe 120 mg  1 mg/kg Subcutaneous Q12H Cornelio Steinberg MD   120 mg at 10/14/23 2100    escitalopram (LEXAPRO) tablet 5 mg  5 mg Oral Daily Cornelio Steinberg MD   5 mg at 10/15/23 0804    ipratropium-albuterol (DUO-NEB) nebulizer solution 3 mL  3 mL Nebulization 4x Daily - RT Cornelio Steinberg MD   3 mL at 10/15/23 0640    levothyroxine (SYNTHROID, LEVOTHROID) tablet 125 mcg  125 mcg Oral Q AM Cornelio Steinberg MD   125 mcg at 10/15/23 0634    metoclopramide (REGLAN) tablet 5 mg  5 mg Oral 4x Daily AC & at Bedtime Cornelio Steinberg,  MD   5 mg at 10/15/23 0804    Pharmacy to Dose enoxaparin (LOVENOX)   Does not apply Continuous PRN Cornelio Steinberg MD        sodium chloride 0.9 % flush 10 mL  10 mL Intravenous PRN Cornelio Steinberg MD         Facility-Administered Medications Ordered in Other Encounters   Medication Dose Route Frequency Provider Last Rate Last Admin    ondansetron (ZOFRAN) injection 2 mg  2 mg Intravenous Once Harry Cerrato MD             Assessment & Plan       Pulmonary embolus, RLL without heart strain - Noted on CTA chest. Venous duplex of BLE - preliminary report of no DVT. On full-dose lovenox. Coag panel pending.     Hx Sustained SVT - S/P ablation on 05/18/2017 by Dr. Krueger at [place]. She was seen in the office by Dr. Elizondo on 09/12/2023 - pacer interrogation demonstrated findings compatible with sustained SVT consistent with mid RP tachycardia.  Patient had EP study by Dr. Elizondo on 10/09/2023 with no inducible SVT. Patient was started on flecainide 50 mg BID after procedure. She stated that she started having symptoms of dizziness, dyspnea and chest pain the following day. Will discontinue flecainide. Continue telemetry monitoring.    Hx thyroid cancer - S/P total thyroidectomy - TSH 81.470, free T4 0.36. On levothyroxine 125 mcg daily. Defer management to attending.    Acid reflux - on PPI    MARINO on CPAP - noted    Morbid obesity due to excess calories - complicates care    Patient stated symptoms have mildly improved. Her symptoms could be related to PE, but will continue to hold flecainide.     She is stable from cardiology standpoint for discharge and to keep follow-up appointment with TAMMIE Hansen with cardiology tomorrow.     TAMMIE Hammer  10/15/23  09:59 CDT

## 2023-10-16 ENCOUNTER — TELEPHONE (OUTPATIENT)
Dept: CARDIOLOGY | Facility: CLINIC | Age: 58
End: 2023-10-16

## 2023-10-16 LAB
F5 GENE MUT ANL BLD/T: NORMAL
FACTOR II, DNA ANALYSIS: NORMAL

## 2023-10-16 NOTE — TELEPHONE ENCOUNTER
Caller: Ana Small    Relationship to patient: Self    Best call back number: [phone]    Chief complaint: CHEST PAIN    Type of visit: FOLLOW UP     Requested date: ASAP     Additional notes:PT HAD AN APPT FOR 10.16.23 THAT WAS CANCELLED AND PT WAS UNAWARE. PT WAS SEEN IN ED ON 10/14/23 FOR THE CHEST PAIN AND TOLD TO FU WITH CARDIOLOGIST. PT WOULD LIKE TO BE SEEN ASAP. PLEASE ADVISE. SENDING HIGH PRIORITY DUE TO PT BEING TOLD IN ED TO FU TODAY OR ASAP.

## 2023-10-16 NOTE — PAYOR COMM NOTE
"  10/16/23. [facility] [phone]  [phone]    INPATIENT ADMISSION ON 10/14/23. FAXING FOR REVIEW.              Ana Small (58 y.o. Female)       Date of Birth   1965    Social Security Number       Address   [address] [place] [address]    Home Phone   [phone]    MRN   [id number]       Alevism   [facility]    Marital Status                               Admission Date   10/14/23    Admission Type   Emergency    Admitting Provider   Graham Townsend DO    Attending Provider       Department, Room/Bed   [address], 441/1       Discharge Date   10/15/2023    Discharge Disposition   Home or Self Care    Discharge Destination                                 Attending Provider: (none)   Allergies: Latex, Sulfa Antibiotics    Isolation: None   Infection: None   Code Status: Prior    Ht: 160 cm (63\")   Wt: 119 kg (263 lb)    Admission Cmt: None   Principal Problem: Pulmonary embolus [I26.99]                   Active Insurance as of 10/14/2023       Primary Coverage       Payor Plan Insurance Group Employer/Plan Group    ANTHEM BLUE CROSS ANTHEM BLUE CROSS BLUE [facility] PPO [id number]       Payor Plan Address Payor Plan Phone Number Payor Plan Fax Number Effective Dates    [phone]  1/1/2017 - None Entered    [place] [address]         Subscriber Name Subscriber Birth Date Member ID       GALINDO SMALL 1/29/1964 [id number]                     Emergency Contacts        (Rel.) Home Phone Work Phone Mobile Phone    Galindo Small (Spouse) [phone] -- --             [facility] Encounter Date/Time: 10/14/2023 [facility]   Hospital Account: [id number]    MRN: [id number]   Patient:  Ana Small   Contact Serial #: [id number]   SSN:          ENCOUNTER             Patient Class: Inpatient   Unit: [address] Service: Medicine     Bed: 441/1   Admitting Provider: Graham Townsend " S, DO   Referring Physician:     Attending Provider:     Adm Diagnosis: Pulmonary embolus [I26.9*               PATIENT          Name: Ana Small : 1965 (58 yrs)   Address: [address] [place] [address] Sex: Female   City: [place] [address]   County: [place]   Marital Status:  Ethnicity: NOT                                                                              Race: WHITE   Primary Care Provider: Camilla Ellison MD Patients Phone: Home Phone: [phone]     Mobile Phone: [phone]   EMERGENCY CONTACT   Contact Name Legal Guardian? Relationship to Patient Home Phone Work Phone   1. Rory Smalle  2. *No Contact Specified* No    Spouse    [phone]              GUARANTOR            Guarantor: Ana Small     : 1965   Address: [address] [place] [address] Sex: [place] [address]     Relation to Patient: Self       Home Phone: [phone]   Guarantor ID: [id number]       Work Phone:     GUARANTOR EMPLOYER   Employer: [employer]         Status: FULL TIME   COVERAGE          PRIMARY INSURANCE   Payor: ANTHEM BLUE CROSS Plan: ANTHEM BLUE CROSS BLUE [employer] PPO   Group Number: [id number] Insurance Type: INDEMNITY   Subscriber Name: GALINDO SMALL Subscriber : 1964   Subscriber ID: [id number] Coverage Address: [facility] [id number] [place] [address]   Pat. Rel. to Subscriber: Spouse Coverage Phone: [phone]   SECONDARY INSURANCE   Payor: N/A Plan: N/A   Group Number:   Insurance Type:     Subscriber Name:   Subscriber :     Subscriber ID:   Coverage Address:     Pat. Rel. to Subscriber:   Coverage Phone:        Contact Serial # ([id number])         2023    Chart ID ([id number])            Cornelio Steinberg MD   Physician  Hospitalist     H&P     Signed     Date of Service: 10/14/23 1126  Creation Time: 10/14/23 1126     Signed       Expand All Collapse All    4                                                               [facility]  [facility] Medicine Services  HISTORY AND PHYSICAL     Date of Admission: 10/14/2023  Primary Care Physician: Camilla Ellison MD     Subjective   Primary Historian: patient      Chief Complaint: sob, cp, dizziness     History of Present Illness  Mass to admit this 58-year-old woman who scented with chest pain, shortness of breath and found hypertensive.  Patient has extensive cardiovascular disease.  Noted also's increase TSH at 8.1 with free T4 of 0.36.  Dr. Shaffer initially concerned of complication of recent cardiac procedure.  He did imaging study that ruled out presence of perforation.  Instead he found small PE and without heart strain.     Hospitalist service requested to facilitate further care.     October 9.  Dr. Elizondo did EP testing with attempted arrhythmia induction using isoproterenol.  He mentioned that there is no inducible tachycardia. Given lack of inducible tachycardia with and without hide to assess for internal fusion as well as no evidence of dual AV alo physiology or accessory pathway presence, decision was made to complete the procedure.      Patient normally follows with Dr. Cerrato primary cardiologist.  Last visit dates back to March 14, 2023.  From his note he indicates that patient had a recent pacer (6 sinus syndrome).  It was at that time when he referred the patient to  because of intermittent palpitation.  He also mentioned history of sleep apnea on CPAP.        Results for orders placed during the hospital encounter of 01/30/23     Adult Transthoracic Echo Complete W/ Cont if Necessary Per Protocol     Interpretation Summary    Left ventricular systolic function is normal. Left ventricular ejection fraction appears to be 61 - 65%.    Left ventricular wall thickness is consistent with mild concentric hypertrophy.    Left ventricular diastolic function was normal.    Estimated right ventricular systolic pressure from tricuspid regurgitation is normal  "(<35 mmHg).     Patient's medication list includes:  flecainide  Metoprolol tartrate  Levothyroxine  Losartan  Metoclopramide  Tiotropium   Zofran  Montelukast  adalimumab     Patient initially came in hypertensive with blood pressure of 166/101.  It went as high as 177/102.  Patient received enalaprilat and nifedipine.  Most recent blood pressure is in the 80s.  Patient complains of dizziness as part of her complaints that she came in the hospital.  Other complaints mainly are shortness of breath, chest pain        She told me she was going to work earlier when she had the chest discomfort described as pleuritic.  It is anteriorly located without radiation.  No palpitation.  She describes it further as sharp and sensation of fullness.  She claimed that the discomfort is \"not horrible\".  On examination she was found with small amount of PE.  She denies any history of prior DVT/VTE.  She denies any leg pain.  She works 12 hours as a  at [employer].  She has strong family history of VTE that affects her sister brother and  mom.  There has not been any studies to check for any genetic or hypercoagulable state in her family.     Patient complained of dizziness described as room spinning.  Reports runny nose and nasal congestion.  But denies any issues in her ears.  She was just started on flecainide and wondering if this has something to do with her symptoms.        Shortness of breath.  She is morbidly obese with BMI of 47.    she has obstructive sleep apnea and uses CPAP which based on conversation after mentioning the  subsequently chuckled. 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  Etiology not clear.  This dates back in .        She is found with significantly elevated TSH with low free T4.  She admits that " it is her fault because she had not taken her thyroid after she ran out for the past 2 to 3 weeks.  She said she could not get it filled.        Review of Systems   Otherwise complete ROS reviewed and negative except as mentioned in the HPI.     Past Medical History:   Medical History[]Expand by Default        Past Medical History:   Diagnosis Date    Abnormal ECG 12/2022    Arrhythmia 2016     SVT.  Has cardiac ablation 2016 or 2017    Asthma 2020    Cancer 2017     Thyroid    COPD (chronic obstructive pulmonary disease) 2020    Diabetes mellitus      Disease of thyroid gland       PARATHYROID TUMOR REMOVED    Heart murmur 2012     PCP in [place] mentioned it's never been mentioned again.    Hiatal hernia      Hyperlipidemia      Hypertension      Migraines      MARINO on CPAP 3/14/2023    Pneumonia      PONV (postoperative nausea and vomiting)      Pulmonary embolus 10/14/2023    Seizures 2010, 2016     X 3 TOTAL    Stroke       APHASIA, SLIGHT WEAKNESS ON RIGHT SIDE (INTENSIFIED WITH FATIGUE)    SVT (supraventricular tachycardia)      Tachycardia           Past Surgical History:  Surgical History         Past Surgical History:   Procedure Laterality Date    ABLATION OF DYSRHYTHMIC FOCUS   2016 or 17    BREAST BIOPSY Right 05/25/2017     Procedure: RIGHT BREAST EXCISIONAL BIOPSY;  Surgeon: Radha Moseley MD;  Location:  [facility] OR;  Service:     CARDIAC ABLATION   05/18/2017    CARDIAC ELECTROPHYSIOLOGY PROCEDURE N/A 01/27/2023     Procedure: Pacemaker DC new  for irreversible sinus node dysfunction;  Surgeon: Harry Cerrato MD;  Location:  [facility] CATH INVASIVE LOCATION;  Service: Cardiology;  Laterality: N/A;    CARDIAC ELECTROPHYSIOLOGY PROCEDURE N/A 01/30/2023     Procedure: Pacemaker DC new;  Surgeon: Harry Cerrato MD;  Location:  [facility] CATH INVASIVE LOCATION;  Service: Cardiology;  Laterality: N/A;    CARDIAC ELECTROPHYSIOLOGY PROCEDURE N/A 10/9/2023     Procedure: EP/CRM Study, SVT (13450);  Surgeon: Caro  MD Emilia;  Location:  [facility] CATH INVASIVE LOCATION;  Service: Cardiovascular;  Laterality: N/A;     SECTION        HEMANGIOMA EXCISION         from liver.     HERNIA REPAIR        INSERT / REPLACE / REMOVE PACEMAKER       LIVER RESECTION         Giant hemangioma    PARATHYROID GLAND SURGERY        PILONIDAL CYSTECTOMY        TONSILLECTOMY             Social History:  reports that she quit smoking about 7 years ago. Her smoking use included cigarettes. She has a 30.00 pack-year smoking history. She has never used smokeless tobacco. She reports that she does not drink alcohol and does not use drugs.     Family History: family history includes Anemia in her mother; Arrhythmia in her paternal grandfather; Breast cancer in her maternal aunt; Cancer in her mother; Diabetes in her mother; Heart attack in her paternal grandfather; Heart disease in her mother; Hypertension in her mother.        Allergies:        Allergies   Allergen Reactions    Latex Anaphylaxis       CALLED TO [facility] IN SURGERY SCHEDULING.    Sulfa Antibiotics Anaphylaxis         Medications:          Prior to Admission medications    Medication Sig Start Date End Date Taking? Authorizing Provider   acetaminophen (Tylenol) 325 MG tablet Take 2 tablets by mouth Every 6 (Six) Hours As Needed for Mild Pain or Moderate Pain (at pacer site). 23     Elmira Georges APRN   albuterol sulfate  (90 Base) MCG/ACT inhaler 2 puffs 2 (Two) Times a Day.       ProviderNabil MD   atorvastatin (LIPITOR) 10 MG tablet Take 1 tablet by mouth Every Night.       ProviderNabil MD   busPIRone (BUSPAR) 5 MG tablet Take 1 tablet by mouth 3 (Three) Times a Day.       ProviderNabil MD   escitalopram (LEXAPRO) 5 MG tablet Take 1 tablet by mouth Daily.       ProviderNabil MD   flecainide (TAMBOCOR) 50 MG tablet Take 1 tablet by mouth 2 (Two) Times a Day. 10/9/23     Emilia Elizondo MD   Flovent  MCG/ACT inhaler INHALE 2 PUFFS  "INTO THE LUNGS TWICE A DAY FOR 90 DAYS 3/3/23     Nabil Marti MD   Humira Pen 40 MG/0.4ML Pen-injector Kit   9/27/23     Nabil Marti MD   levothyroxine (SYNTHROID, LEVOTHROID) 125 MCG tablet Take 1 tablet by mouth Daily.       Nabil Marti MD   losartan (COZAAR) 25 MG tablet Take 1 tablet by mouth Daily.       Nabil Marti MD   metoclopramide (REGLAN) 5 MG tablet Take 1 tablet by mouth 4 (Four) Times a Day Before Meals & at Bedtime.       Nabil Marti MD   metoprolol tartrate (LOPRESSOR) 50 MG tablet Take 1 tablet by mouth 2 (Two) Times a Day. 3/14/23     Harry Cerrato MD   montelukast (SINGULAIR) 10 MG tablet Take 1 tablet by mouth Every Night.       Nabil Marti MD   ondansetron ODT (ZOFRAN-ODT) 4 MG disintegrating tablet Place 1 tablet on the tongue 4 (Four) Times a Day As Needed for Nausea or Vomiting. 3/7/22     Isaac Raines MD   tiotropium bromide monohydrate (Spiriva Respimat) 2.5 MCG/ACT aerosol solution inhaler Inhale 2 puffs Daily.       Nabil Marti MD      I have utilized all available immediate resources to obtain, update, or review the patient's current medications (including all prescriptions, over-the-counter products, herbals, cannabis/cannabidiol products, and vitamin/mineral/dietary (nutritional) supplements).     Objective      Vital Signs: /78   Pulse 67   Temp 98.3 °F (36.8 °C)   Resp 18   Ht 160 cm (63\")   Wt 119 kg (263 lb)   SpO2 96%   BMI 46.59 kg/m²   Physical Exam   /Facial skin which she said been going on for several years.  Morbidly obese with BMI of 47  No gross chest wall tenderness.  GEN: Awake, alert, interactive, in NAD  HEENT: Atraumatic, PERRLA, EOMI, Anicteric, Trachea midline  Lungs: CTAB, no wheezing/rales/rhonchi  Heart: RRR, +S1/s2, no rub  ABD: soft, nt/nd, +BS, no guarding/rebound  Extremities: atraumatic, no cyanosis, no edema  Skin: no rashes or lesions  Neuro: AAOx3, no focal " deficits  Psych: normal mood & affect        Results Reviewed:  Lab Results (last 24 hours)         Procedure Component Value Units Date/Time     High Sensitivity Troponin T 2Hr [[id number]]  (Normal) Collected: 10/14/23 0958     Specimen: Blood Updated: 10/14/23 1024       HS Troponin T 9 ng/L         Troponin T Delta -1 ng/L       Narrative:       High Sensitive Troponin T Reference Range:  <10.0 ng/L- Negative Female for AMI  <15.0 ng/L- Negative Male for AMI  >=10 - Abnormal Female indicating possible myocardial injury.  >=15 - Abnormal Male indicating possible myocardial injury.   Clinicians would have to utilize clinical acumen, EKG, Troponin, and serial changes to determine if it is an Acute Myocardial Infarction or myocardial injury due to an underlying chronic condition.           T4, Free [[id number]]  (Abnormal) Collected: 10/14/23 0821     Specimen: Blood Updated: 10/14/23 0933       Free T4 0.36 ng/dL       Narrative:       Results may be falsely increased if patient taking Biotin.        TSH [[id number]]  (Abnormal) Collected: 10/14/23 0821     Specimen: Blood Updated: 10/14/23 0932       TSH 81.470 uIU/mL       [place] Draw [[id number]] Collected: 10/14/23 0821     Specimen: Blood Updated: 10/14/23 0930     Narrative:       The following orders were created for panel order [place] Draw.  Procedure                               Abnormality         Status                     ---------                               -----------         ------                     Green Top (Gel)[[id number]]                                  Final result               Lavender Top[[id number]]                                     Final result               Red Top[[id number]]                                          Final result               Light Blue Top[[id number]]                                   Final result                  Please view results for these tests on the individual orders.     Green Top (Gel) [[id number]] Collected:  10/14/23 0821     Specimen: Blood Updated: 10/14/23 0930       Extra Tube Hold for add-ons.       Comment: Auto resulted.        Lavender Top [[id number]] Collected: 10/14/23 0821     Specimen: Blood Updated: 10/14/23 0930       Extra Tube hold for add-on       Comment: Auto resulted        Red Top [[id number]] Collected: 10/14/23 0821     Specimen: Blood Updated: 10/14/23 0930       Extra Tube Hold for add-ons.       Comment: Auto resulted.        Light Blue Top [[id number]] Collected: 10/14/23 0821     Specimen: Blood Updated: 10/14/23 0930       Extra Tube Hold for add-ons.       Comment: Auto resulted        BNP [[id number]]  (Normal) Collected: 10/14/23 0821     Specimen: Blood Updated: 10/14/23 0906       proBNP 83.2 pg/mL       Narrative:       This assay is used as an aid in the diagnosis of individuals suspected of having heart failure. It can be used as an aid in the diagnosis of acute decompensated heart failure (ADHF) in patients presenting with signs and symptoms of ADHF to the emergency department (ED). In addition, NT-proBNP of <300 pg/mL indicates ADHF is not likely.     Age Range        Result Interpretation  NT-proBNP Concentration (pg/mL:        <50             Positive            >450                        Fisher                        300-450                          Negative                        <300     50-75           Positive            >900                  Gray                300-900                  Negative            <300        >75             Positive            >1800                  Gray                300-1800                  Negative            <300     D-dimer, Quantitative [[id number]]  (Abnormal) Collected: 10/14/23 0821     Specimen: Blood Updated: 10/14/23 0902       D-Dimer, Quantitative 3.31 MCGFEU/mL       Narrative:       According to the assay 's published package insert, a normal (<0.50 MCGFEU/mL) D-dimer result in conjunction with a non-high clinical  "probability assessment, excludes deep vein thrombosis (DVT) and pulmonary embolism (PE) with high sensitivity.     D-dimer values increase with age and this can make VTE exclusion of an older population difficult. To address this, the American College of Physicians, based on best available evidence and recent guidelines, recommends that clinicians use age-adjusted D-dimer thresholds in patients greater than 50 years of age with: a) a low probability of PE who do not meet all Pulmonary Embolism Rule Out Criteria, or b) in those with intermediate probability of PE.   The formula for an age-adjusted D-dimer cut-off is \"age/100\".  For example, a 60 year old patient would have an age-adjusted cut-off of 0.60 MCGFEU/mL and an 80 year old 0.80 MCGFEU/mL.     Lipase [[id number]]  (Normal) Collected: 10/14/23 0821     Specimen: Blood Updated: 10/14/23 0902       Lipase 35 U/L       Comprehensive Metabolic Panel [[id number]]  (Abnormal) Collected: 10/14/23 0821     Specimen: Blood Updated: 10/14/23 0848       Glucose 146 mg/dL         BUN 12 mg/dL         Creatinine 1.08 mg/dL         Sodium 140 mmol/L         Potassium 4.1 mmol/L         Chloride 102 mmol/L         CO2 25.0 mmol/L         Calcium 8.7 mg/dL         Total Protein 6.7 g/dL         Albumin 4.2 g/dL         ALT (SGPT) 40 U/L         AST (SGOT) 25 U/L         Alkaline Phosphatase 112 U/L         Total Bilirubin 0.4 mg/dL         Globulin 2.5 gm/dL         A/G Ratio 1.7 g/dL         BUN/Creatinine Ratio 11.1       Anion Gap 13.0 mmol/L         eGFR 58.9 mL/min/1.73       Narrative:       GFR Normal >60  Chronic Kidney Disease <60  Kidney Failure <15        High Sensitivity Troponin T [[id number]]  (Abnormal) Collected: 10/14/23 0821     Specimen: Blood Updated: 10/14/23 0845       HS Troponin T 10 ng/L       Narrative:       High Sensitive Troponin T Reference Range:  <10.0 ng/L- Negative Female for AMI  <15.0 ng/L- Negative Male for AMI  >=10 - Abnormal Female " indicating possible myocardial injury.  >=15 - Abnormal Male indicating possible myocardial injury.   Clinicians would have to utilize clinical acumen, EKG, Troponin, and serial changes to determine if it is an Acute Myocardial Infarction or myocardial injury due to an underlying chronic condition.           CBC & Differential [[id number]]  (Abnormal) Collected: 10/14/23 0821     Specimen: Blood Updated: 10/14/23 0829     Narrative:       The following orders were created for panel order CBC & Differential.  Procedure                               Abnormality         Status                     ---------                               -----------         ------                     CBC Auto Differential[[id number]]        Abnormal            Final result                  Please view results for these tests on the individual orders.     CBC Auto Differential [[id number]]  (Abnormal) Collected: 10/14/23 0821     Specimen: Blood Updated: 10/14/23 0829       WBC 7.15 10*3/mm3         RBC 4.92 10*6/mm3         Hemoglobin 14.6 g/dL         Hematocrit 45.2 %         MCV 91.9 fL         MCH 29.7 pg         MCHC 32.3 g/dL         RDW 14.6 %         RDW-SD 49.1 fl         MPV 9.3 fL         Platelets 281 10*3/mm3         Neutrophil % 69.7 %         Lymphocyte % 16.1 %         Monocyte % 7.8 %         Eosinophil % 5.3 %         Basophil % 0.4 %         Immature Grans % 0.7 %         Neutrophils, Absolute 4.98 10*3/mm3         Lymphocytes, Absolute 1.15 10*3/mm3         Monocytes, Absolute 0.56 10*3/mm3         Eosinophils, Absolute 0.38 10*3/mm3         Basophils, Absolute 0.03 10*3/mm3         Immature Grans, Absolute 0.05 10*3/mm3         nRBC 0.0 /100 WBC               Imaging Results (Last 24 Hours)         Procedure Component Value Units Date/Time     CT Angiogram Chest [[id number]] Collected: 10/14/23 0947       Updated: 10/14/23 0957     Narrative:       EXAMINATION: CT ANGIOGRAM CHEST-      10/14/2023 9:28 AM CDT     HISTORY:  Status post electrophysiological study. Vomiting and shortness  of breath. History of third-degree heart block.     In order to have a CT radiation dose as low as reasonably achievable  Automated Exposure Control was utilized for adjustment of the mA and/or  KV according to patient size.     DLP in mGycm= 550.        CT Angiogram Chest with IV contrast.  CT angiography protocol.  CT imaging with bolus IV contrast injection.  Under concurrent supervision axial, sagittal, coronal,  three-dimensional, and MIP data sets were constructed on an independent  work station.     Comparison is made with a chest CTA exam from 11/11/2022.     Heart size is normal.  Normal size thoracic aorta.  No aneurysm or dissection.     Normal size and well-opacified pulmonary arteries.  Small pulmonary emboli are present within lower lobe branches of the  RIGHT pulmonary artery.  Clot burden is low.  There is no RIGHT heart strain.     Regarding the recent heart procedure there is no pericardial thickening  or fluid.  There is a normal CT appearance of the esophagus.  No mediastinal air or fluid to indicate esophageal inflammation or  perforation.     Fully expanded lungs are essentially clear.  There is mild chronic atelectasis or scarring within the lingula and  adjacent LEFT lower lobe.  No pneumonia, pneumothorax, or pleural effusion.     Surgical clips within the upper abdomen related to liver resection.        Impression:       1. A few RIGHT lower lobe pulmonary emboli are present.  Low clot burden.  No RIGHT heart strain.  2. No cardiac wall injury or esophageal perforation.                                         This report was signed and finalized on 10/14/2023 9:54 AM CDT by Dr. Luis Miguel Melendez MD.        XR Chest 1 View [[id number]] Collected: 10/14/23 0925       Updated: 10/14/23 0929     Narrative:       EXAMINATION: XR CHEST 1 VW-     10/14/2023 8:37 AM CDT     HISTORY: Chest pain.     COMPARISON:  1/30/2023 1 view chest  x-ray.     1 view chest x-ray.     Stable heart size.  Aortic arch calcification.  2-lead LEFT cardiac pacer.     Fully expanded lungs.     No pneumonia, pneumothorax, or congestive failure.        Impression:       1. No focal acute lung disease.     This report was signed and finalized on 10/14/2023 9:26 AM CDT by Dr. Luis Miguel Melendez MD.                I have personally reviewed and interpreted the radiology studies and ECG obtained at time of admission.      Assessment / Plan   Assessment:        Active Hospital Problems     Diagnosis      **Pulmonary embolus                 Medical Decision Making  Number and Complexity of problems:   Shortness of breath in the setting of morbid obesity and small amount of PE.  PE no reported evidence of heart strain; no oxygen need.  Patient denies any history of bleeding.  She is past overdue for age-appropriate screening colonoscopy.  We will treat with full dose anticoagulation.  Patient and  agreeable.  Pleurisy secondary to PE  Hypertension in the ER status post treatment with enalaprilat and nifedipine.  Now with low blood pressure in the 80s.  We will bolus  250 cc fluid and monitor.  Hold off on antihypertensive medications from home meds  Dizziness.  Patient been dizzy even before elevated blood pressure.  Supportive care. Consider meclizine.  Grossly nonfocal exam.  Hypothyroidism with elevated TSH (greater than 80) and low TSH due to noncompliance with medication.  Restart medication  Ankylosing spondylitis on Humira.  Strong family history of VTE-hypercoagulable work-up  Recent EP study October 9 with no inducible arrhythmia.  Pacemaker in situ for history of sick sinus syndrome.  Depression on BuSpar and escitalopram.        Treatment plan.  We will check venous ultrasound of lower extremities to check for blood clot presence and or burden of blood clot.  Otherwise treatment will be anticoagulation.     Consult cardiology in the for question in reference to  new medication flecainide.  I reviewed the medications adverse effect there are at times some shortness of breath in very minimal percentage.     Discussed about echocardiogram but has low burden of PE.  No significant strain, no significant elevation in troponin either.  No oxygen requirement.  They are agreeable to hold off on echocardiogram     Other plans as per mentioned above and ordered.     Conditions and Status  Fair.  Monitor blood pressure following bolus of fluid.           MDM Data  External documents reviewed: Reviewed  Cardiac tracing (EKG, telemetry) interpretation: EKG showed normal sinus rhythm, no acute ST-T wave changes.  61.  Radiology interpretation: Reviewed as per interpreted by radiologist  Labs reviewed: Reviewed and correlated in patient's condition  Any tests that were considered but not ordered: None  Decision rules/scores evaluated (example [id number], Wells, etc): None     Discussed with: Patient and .  Nursing staff        Care Planning  Shared decision making: Patient  Code status and discussions: Full code     Disposition  Social Determinants of Health that impact treatment or disposition: None identified at this time  Estimated length of stay is to be determined     I confirmed that the patient's advanced care plan is present, code status is documented, and a surrogate decision maker is listed in the patient's medical record.      The patient's surrogate decision maker is  Galindo     The patient was seen and examined by me on   Electronically signed by Cornelio Steinberg MD, 10/14/23, 11:26 CDT.                      Jero, Pao L, APRN   Nurse Practitioner  Cardiology     Consults     Attested     Date of Service: 10/14/23 1256  Creation Time: 10/14/23 1256  Consult Orders   Inpatient Cardiology Consult [[id number]] ordered by Cornelio Steinberg MD at 10/14/23 1202          Attested           Attestation signed by Fito Payne DO at  10/14/23 7936     I have reviewed this documentation and agree.               Expand All Collapse All    [facility] CONSULT NOTE     Referring Provider: Dr. Cornelio Steinberg     Reason for Consultation: Symptoms R/T Flecainide      Chief complaint:       Chief Complaint   Patient presents with    Chest Pain    Shortness of Breath    Vomiting    Nausea            History of present illness:  Ana Small is a 58 y.o. morbidly obese female with history of SVT who recently underwent EP study on 10/09/2023 by Dr. Elizondo. No inducible SVT with or without high-dose isoproterenol. She was discharged home on flecainide 50 mg BID. Since starting the medication, she has noticed increase dizziness, shortness of breath and chest pain. These symptoms prompted presentation to the ER for further evaluation.      Work-up in the ER, HS troponin were essentially normal without delta, along with normal proBNP. Her D-dimer was elevated, therefore, CTA chest was completed. Patient found to have pulmonary emboli in RLL with low clot burden. Chemistry and CBC were unremarkable. Patient has PMHx thyroid cancer - S/P total thyroidectomy. Her TSH was 81.470 with free T4 0.36, demonstrating severe hypothyroidism.      Patient was started on full-dose lovenox and admitted to the hospitalist service. Cardiology consulted for recommendations of flecainide with patient symptom complaints.         History  Medical History        Past Medical History:   Diagnosis Date    Abnormal ECG 12/2022    Arrhythmia 2016     SVT.  Has cardiac ablation 2016 or 2017    Asthma 2020    Cancer 2017     Thyroid    COPD (chronic obstructive pulmonary disease) 2020    Diabetes mellitus      Disease of thyroid gland       PARATHYROID TUMOR REMOVED    Heart murmur 2012     PCP in [place] mentioned it's never been mentioned again.    Hiatal hernia      Hyperlipidemia      Hypertension      Migraines      MARINO on CPAP 3/14/2023    Pneumonia       PONV (postoperative nausea and vomiting)      Pulmonary embolus 10/14/2023    Seizures 2010, 2016     X 3 TOTAL    Stroke       APHASIA, SLIGHT WEAKNESS ON RIGHT SIDE (INTENSIFIED WITH FATIGUE)    SVT (supraventricular tachycardia)      Tachycardia        ,   Surgical History         Past Surgical History:   Procedure Laterality Date    ABLATION OF DYSRHYTHMIC FOCUS    or 17    BREAST BIOPSY Right 2017     Procedure: RIGHT BREAST EXCISIONAL BIOPSY;  Surgeon: Radha Moseley MD;  Location:  [facility] OR;  Service:     CARDIAC ABLATION   2017    CARDIAC ELECTROPHYSIOLOGY PROCEDURE N/A 2023     Procedure: Pacemaker DC new  for irreversible sinus node dysfunction;  Surgeon: Harry Cerrato MD;  Location:  [facility] CATH INVASIVE LOCATION;  Service: Cardiology;  Laterality: N/A;    CARDIAC ELECTROPHYSIOLOGY PROCEDURE N/A 2023     Procedure: Pacemaker DC new;  Surgeon: Harry Cerrato MD;  Location:  [facility] CATH INVASIVE LOCATION;  Service: Cardiology;  Laterality: N/A;    CARDIAC ELECTROPHYSIOLOGY PROCEDURE N/A 10/9/2023     Procedure: EP/CRM Study, SVT (82285);  Surgeon: Emilia Elizondo MD;  Location:  [facility] CATH INVASIVE LOCATION;  Service: Cardiovascular;  Laterality: N/A;     SECTION        HEMANGIOMA EXCISION         from liver.     HERNIA REPAIR        INSERT / REPLACE / REMOVE PACEMAKER       LIVER RESECTION         Giant hemangioma    PARATHYROID GLAND SURGERY        PILONIDAL CYSTECTOMY        TONSILLECTOMY          ,         Family History   Problem Relation Age of Onset    Breast cancer Maternal Aunt      Cancer Mother           LIVER    Diabetes Mother      Heart disease Mother      Anemia Mother           Pernicious anemia - took B-12 shots    Hypertension Mother           Uncontrolled    Arrhythmia Paternal Grandfather           Some type of congenital arrhythmia    Heart attack Paternal Grandfather           Caused by some congenital arrhythmia   ,   Social History             Tobacco Use    Smoking status: Former       Packs/day: 2.00       Years: 15.00       Additional pack years: 0.00       Total pack years: 30.00       Types: Cigarettes       Quit date: 2016       Years since quittin.1    Smokeless tobacco: Never    Tobacco comments:       Off/on since age 27. Quit for years at a time.   Vaping Use    Vaping Use: Never used   Substance Use Topics    Alcohol use: No    Drug use: No   ,      Medications  Current Medications             Current Facility-Administered Medications   Medication Dose Route Frequency Provider Last Rate Last Admin    acetaminophen (TYLENOL) tablet 650 mg  650 mg Oral Q6H PRN Cornelio Steinberg MD        atorvastatin (LIPITOR) tablet 10 mg  10 mg Oral Nightly Cornelio Steinberg MD        budesonide (PULMICORT) nebulizer solution 0.5 mg  0.5 mg Nebulization BID - RT Cornelio Steinberg MD        busPIRone (BUSPAR) tablet 5 mg  5 mg Oral TID Cornelio Steinberg MD        Enoxaparin Sodium (LOVENOX) syringe 120 mg  1 mg/kg Subcutaneous Q12H Cornelio Steinberg MD        escitalopram (LEXAPRO) tablet 5 mg  5 mg Oral Daily Cornelio Steinberg MD   5 mg at 10/14/23 1241    ipratropium-albuterol (DUO-NEB) nebulizer solution 3 mL  3 mL Nebulization 4x Daily - RT Cornelio Steinberg MD        [START ON 10/15/2023] levothyroxine (SYNTHROID, LEVOTHROID) tablet 125 mcg  125 mcg Oral Q AM Cornelio Steinberg MD        metoclopramide (REGLAN) tablet 5 mg  5 mg Oral 4x Daily AC & at Bedtime Cornelio Steinberg MD   5 mg at 10/14/23 1241    Pharmacy to Dose enoxaparin (LOVENOX)   Does not apply Continuous PRCornelio Moreau MD        sodium chloride 0.9 % flush 10 mL  10 mL Intravenous PRN Cornelio Steinberg MD                    Facility-Administered Medications Ordered in Other Encounters   Medication Dose Route Frequency Provider Last Rate Last Admin    ondansetron (ZOFRAN) injection 2 mg  2  "mg Intravenous Once Harry Cerrato MD                Allergies:  Latex and Sulfa antibiotics     REVIEW OF SYSTEMS:  Constitutional: Denies fever or chills. Denies change in energy level or malaise. Patient stated she has multiple family members with history of blood clots. Unsure if any blood clotting disorder history.   HEENT: Denies headaches or visual disturbances. Denies difficulty swallowing or sore throat.  Respiratory: Denies cough or hoarseness.Shortness of breath the day after starting flecainide.   Cardiovascular: Chest pain that started the day after starting flecainide. Denies palpitations. Denies presyncope/syncope. Denies orthopnea/PND. Denies lower extremity edema.   Gastrointestinal: Denies abdominal pain. Denies nausea/vomiting. Denies change in bowel habits or history of recent GI tract blood loss.   Genitourinary: Denies urinary urgency or frequency. Denies dysuria, hematuria.  Musculoskeletal: Denies pain or swelling in joints.  Neurological: Denies paresthesias. Denies headache. Denies seizure or stroke symptoms. Dizziness that started the day after starting flecainide.   Behavioral/Psych: Denies problems with anxiety or depression.        All other systems are negative except where stated above.              Objective []Expand by Default  Physical Exam:     /72 (BP Location: Right arm, Patient Position: Sitting)   Pulse 59   Temp 97.7 °F (36.5 °C) (Oral)   Resp 18   Ht 160 cm (63\")   Wt 119 kg (263 lb)   SpO2 97%   BMI 46.59 kg/m²         General Appearance:    Alert, cooperative, in no acute distress   HEENT:    Normocephalic. Atraumatic. SALTY.   Neck:   No adenopathy, supple, trachea midline, no thyromegaly, no carotid bruit, no JVD   Lungs:     Clear to auscultation, respirations regular, even and unlabored    Heart:    Regular rhythm and normal rate, normal S1 and S2, no murmur, no gallop, no rub, no click   Abdomen:     Normal bowel sounds, obese, no masses, no organomegaly, " soft non-tender, non-distended, no guarding, no rebound tenderness   Extremities:   Moves all extremities, no edema, no cyanosis, no redness   Pulses:   Pulses palpable and equal bilaterally   Skin:   No bleeding, bruising or rash   Lymph nodes:   No palpable adenopathy   Neurologic:   Grossly intact                  Results Review:              I reviewed the patient's new clinical results.     Lab Results (last 72 hours)         Procedure Component Value Units Date/Time     High Sensitivity Troponin T 2Hr [[id number]]  (Normal) Collected: 10/14/23 0958     Specimen: Blood Updated: 10/14/23 1024       HS Troponin T 9 ng/L         Troponin T Delta -1 ng/L       Narrative:       High Sensitive Troponin T Reference Range:  <10.0 ng/L- Negative Female for AMI  <15.0 ng/L- Negative Male for AMI  >=10 - Abnormal Female indicating possible myocardial injury.  >=15 - Abnormal Male indicating possible myocardial injury.   Clinicians would have to utilize clinical acumen, EKG, Troponin, and serial changes to determine if it is an Acute Myocardial Infarction or myocardial injury due to an underlying chronic condition.           T4, Free [[id number]]  (Abnormal) Collected: 10/14/23 0821     Specimen: Blood Updated: 10/14/23 0933       Free T4 0.36 ng/dL       Narrative:       Results may be falsely increased if patient taking Biotin.        TSH [[id number]]  (Abnormal) Collected: 10/14/23 0821     Specimen: Blood Updated: 10/14/23 0932       TSH 81.470 uIU/mL       [facility] Draw [[id number]] Collected: 10/14/23 0821     Specimen: Blood Updated: 10/14/23 0930     Narrative:       The following orders were created for panel order [facility] Draw.  Procedure                               Abnormality         Status                     ---------                               -----------         ------                     Green Top (Gel)[[id number]]                                  Final result               Lavender Top[[id number]]                                      Final result               Red Top[[id number]]                                          Final result               Light Blue Top[[id number]]                                   Final result                  Please view results for these tests on the individual orders.     Green Top (Gel) [[id number]] Collected: 10/14/23 0821     Specimen: Blood Updated: 10/14/23 0930       Extra Tube Hold for add-ons.       Comment: Auto resulted.        Lavender Top [[id number]] Collected: 10/14/23 0821     Specimen: Blood Updated: 10/14/23 0930       Extra Tube hold for add-on       Comment: Auto resulted        Red Top [[id number]] Collected: 10/14/23 0821     Specimen: Blood Updated: 10/14/23 0930       Extra Tube Hold for add-ons.       Comment: Auto resulted.        Light Blue Top [[id number]] Collected: 10/14/23 0821     Specimen: Blood Updated: 10/14/23 0930       Extra Tube Hold for add-ons.       Comment: Auto resulted        BNP [[id number]]  (Normal) Collected: 10/14/23 0821     Specimen: Blood Updated: 10/14/23 0906       proBNP 83.2 pg/mL       Narrative:       This assay is used as an aid in the diagnosis of individuals suspected of having heart failure. It can be used as an aid in the diagnosis of acute decompensated heart failure (ADHF) in patients presenting with signs and symptoms of ADHF to the emergency department (ED). In addition, NT-proBNP of <300 pg/mL indicates ADHF is not likely.     Age Range         Result Interpretation  NT-proBNP Concentration (pg/mL:        <50             Positive            >450                         Fisher                           300-450                           Negative               <300     50-75           Positive            >900                  Gray                300-900                  Negative            <300        >75             Positive            >1800                  Gray                300-1800                  Negative            <300      "D-dimer, Quantitative [[id number]]  (Abnormal) Collected: 10/14/23 0821     Specimen: Blood Updated: 10/14/23 0902       D-Dimer, Quantitative 3.31 MCGFEU/mL       Narrative:       According to the assay 's published package insert, a normal (<0.50 MCGFEU/mL) D-dimer result in conjunction with a non-high clinical probability assessment, excludes deep vein thrombosis (DVT) and pulmonary embolism (PE) with high sensitivity.     D-dimer values increase with age and this can make VTE exclusion of an older population difficult. To address this, the [employer], based on best available evidence and recent guidelines, recommends that clinicians use age-adjusted D-dimer thresholds in patients greater than 50 years of age with: a) a low probability of PE who do not meet all Pulmonary Embolism Rule Out Criteria, or b) in those with intermediate probability of PE.   The formula for an age-adjusted D-dimer cut-off is \"age/100\".  For example, a 60 year old patient would have an age-adjusted cut-off of 0.60 MCGFEU/mL and an 80 year old 0.80 MCGFEU/mL.     Lipase [[id number]]  (Normal) Collected: 10/14/23 0821     Specimen: Blood Updated: 10/14/23 0902       Lipase 35 U/L       Comprehensive Metabolic Panel [[id number]]  (Abnormal) Collected: 10/14/23 0821     Specimen: Blood Updated: 10/14/23 0848       Glucose 146 mg/dL         BUN 12 mg/dL         Creatinine 1.08 mg/dL         Sodium 140 mmol/L         Potassium 4.1 mmol/L         Chloride 102 mmol/L         CO2 25.0 mmol/L         Calcium 8.7 mg/dL         Total Protein 6.7 g/dL         Albumin 4.2 g/dL         ALT (SGPT) 40 U/L         AST (SGOT) 25 U/L         Alkaline Phosphatase 112 U/L         Total Bilirubin 0.4 mg/dL         Globulin 2.5 gm/dL         A/G Ratio 1.7 g/dL         BUN/Creatinine Ratio 11.1       Anion Gap 13.0 mmol/L         eGFR 58.9 mL/min/1.73       Narrative:       GFR Normal >60  Chronic Kidney Disease <60  Kidney Failure " <15        High Sensitivity Troponin T [[id number]]  (Abnormal) Collected: 10/14/23 0821     Specimen: Blood Updated: 10/14/23 0845       HS Troponin T 10 ng/L       Narrative:       High Sensitive Troponin T Reference Range:  <10.0 ng/L- Negative Female for AMI  <15.0 ng/L- Negative Male for AMI  >=10 - Abnormal Female indicating possible myocardial injury.  >=15 - Abnormal Male indicating possible myocardial injury.   Clinicians would have to utilize clinical acumen, EKG, Troponin, and serial changes to determine if it is an Acute Myocardial Infarction or myocardial injury due to an underlying chronic condition.           CBC & Differential [[id number]]  (Abnormal) Collected: 10/14/23 0821     Specimen: Blood Updated: 10/14/23 0829     Narrative:       The following orders were created for panel order CBC & Differential.  Procedure                               Abnormality         Status                     ---------                               -----------         ------                     CBC Auto Differential[[id number]]        Abnormal            Final result                  Please view results for these tests on the individual orders.     CBC Auto Differential [[id number]]  (Abnormal) Collected: 10/14/23 0821     Specimen: Blood Updated: 10/14/23 0829       WBC 7.15 10*3/mm3         RBC 4.92 10*6/mm3         Hemoglobin 14.6 g/dL         Hematocrit 45.2 %         MCV 91.9 fL         MCH 29.7 pg         MCHC 32.3 g/dL         RDW 14.6 %         RDW-SD 49.1 fl         MPV 9.3 fL         Platelets 281 10*3/mm3         Neutrophil % 69.7 %         Lymphocyte % 16.1 %         Monocyte % 7.8 %         Eosinophil % 5.3 %         Basophil % 0.4 %         Immature Grans % 0.7 %         Neutrophils, Absolute 4.98 10*3/mm3         Lymphocytes, Absolute 1.15 10*3/mm3         Monocytes, Absolute 0.56 10*3/mm3         Eosinophils, Absolute 0.38 10*3/mm3         Basophils, Absolute 0.03 10*3/mm3         Immature Grans,  Absolute 0.05 10*3/mm3         nRBC 0.0 /100 WBC                  Imaging Results (Last 72 Hours)         Procedure Component Value Units Date/Time     CT Angiogram Chest [[id number]] Collected: 10/14/23 0947       Updated: 10/14/23 0957     Narrative:       EXAMINATION: CT ANGIOGRAM CHEST-      10/14/2023 9:28 AM CDT     HISTORY: Status post electrophysiological study. Vomiting and shortness  of breath. History of third-degree heart block.     In order to have a CT radiation dose as low as reasonably achievable  Automated Exposure Control was utilized for adjustment of the mA and/or  KV according to patient size.     DLP in mGycm= 550.        CT Angiogram Chest with IV contrast.  CT angiography protocol.  CT imaging with bolus IV contrast injection.  Under concurrent supervision axial, sagittal, coronal,  three-dimensional, and MIP data sets were constructed on an independent  work station.     Comparison is made with a chest CTA exam from 11/11/2022.     Heart size is normal.  Normal size thoracic aorta.  No aneurysm or dissection.     Normal size and well-opacified pulmonary arteries.  Small pulmonary emboli are present within lower lobe branches of the  RIGHT pulmonary artery.  Clot burden is low.  There is no RIGHT heart strain.     Regarding the recent heart procedure there is no pericardial thickening  or fluid.  There is a normal CT appearance of the esophagus.  No mediastinal air or fluid to indicate esophageal inflammation or  perforation.     Fully expanded lungs are essentially clear.  There is mild chronic atelectasis or scarring within the lingula and  adjacent LEFT lower lobe.  No pneumonia, pneumothorax, or pleural effusion.     Surgical clips within the upper abdomen related to liver resection.        Impression:       1. A few RIGHT lower lobe pulmonary emboli are present.  Low clot burden.  No RIGHT heart strain.  2. No cardiac wall injury or esophageal perforation.                                          This report was signed and finalized on 10/14/2023 9:54 AM CDT by Dr. Luis Miguel Melendez MD.        XR Chest 1 View [[id number]] Collected: 10/14/23 0925       Updated: 10/14/23 0929     Narrative:       EXAMINATION: XR CHEST 1 VW-     10/14/2023 8:37 AM CDT     HISTORY: Chest pain.     COMPARISON:  1/30/2023 1 view chest x-ray.     1 view chest x-ray.     Stable heart size.  Aortic arch calcification.  2-lead LEFT cardiac pacer.     Fully expanded lungs.     No pneumonia, pneumothorax, or congestive failure.        Impression:       1. No focal acute lung disease.     This report was signed and finalized on 10/14/2023 9:26 AM CDT by Dr. Luis Miguel Melendez MD.                2D Echocardiogram (01/31/2023):    Left ventricular systolic function is normal. Left ventricular ejection fraction appears to be 61 - 65%.    Left ventricular wall thickness is consistent with mild concentric hypertrophy.    Left ventricular diastolic function was normal.    Estimated right ventricular systolic pressure from tricuspid regurgitation is normal (<35 mmHg).                    Assessment & Plan  Pulmonary embolus, RLL without heart strain - Noted on CTA chest. Venous duplex of BLE ordered. Patient has been started on full-dose lovenox and coag panel has been collected with patient stating family history of multiple clots. She may need hematology consult.     Hx Sustained SVT - S/P ablation on 05/18/2017 by Dr. Krueger at [place]. She was seen in the office by Dr. Elizondo on 09/12/2023 - pacer interrogation demonstrated findings compatible with sustained SVT consistent with mid RP tachycardia.  Patient had EP study by Dr. Elizondo on 10/09/2023 with no inducible SVT. Patient was started on flecainide 50 mg BID after procedure. She stated that she started having symptoms of dizziness, dyspnea and chest pain the following day. Will discontinue flecainide. Continue telemetry monitoring.     Hx thyroid cancer - S/P total thyroidectomy - TSH  81.470, free T4 0.36. On levothyroxine 125 mcg daily. Defer management to attending.    Acid reflux - on PPI    MARINO on CPAP - noted    Morbid obesity due to excess calories - complicates care              With patient symptoms of dizziness, dyspnea, and chest pain starting after she started flecainide; will discontinue medication and monitor. She does have PE, RLL with low clot burden that could be cause of her symptoms. Patient has been started on full-dose lovenox and venous duplex of BLE ordered. Coag panel has also been ordered.            Further orders per Dr. Payne.      Thank you for asking us to follow this patient with you. Please note that this documentation resulted in a face-to-face encounter provided by me.         TAMMIE Hammer Samantha L, APRN   Nurse Practitioner  Cardiology     Progress Notes     Attested     Date of Service: 10/15/23 0959  Creation Time: 10/15/23 0959     Attested           Attestation signed by Fito Payne DO at 10/15/23 173     I have reviewed this documentation and agree.               Expand All Collapse All    [facility] -  Progress Note      LOS: 1 day   Patient Care Team:  Camilla Ellison MD as PCP - General (Family Medicine)  Stefania Mclaughlin APRN as Nurse Practitioner (Nurse Practitioner)     Chief Complaint: F/U Chest Pain, Dizziness, Shortness of breath        Subjective   Resting in bed. Stated her symptoms were improving, but still there when she gets up and moves around.         REVIEW OF SYSTEMS:  Constitutional: Denies fever or chills. Denies change in energy level or malaise.  HEENT: Denies headaches or visual disturbances. Denies difficulty swallowing or sore throat.  Respiratory: Denies cough or hoarseness. Shortness of breath improved, still mild with exertion.   Cardiovascular: Mild chest pain with exertion. Denies palpitations. Denies presyncope/syncope. Denies orthopnea/PND.  Denies lower extremity edema.   Gastrointestinal: Denies abdominal pain. Denies nausea/vomiting. Denies change in bowel habits or history of recent GI tract blood loss.   Genitourinary: Denies urinary urgency or frequency. Denies dysuria, hematuria.  Musculoskeletal: Denies pain or swelling in joints.  Neurological: Denies paresthesias. Denies headache. Denies seizure or stroke symptoms. Dizziness with moving around.   Behavioral/Psych: Denies problems with anxiety or depression.     All other systems are negative except where stated above.              Objective   Vital Sign Min/Max for last 24 hours  Temp  Min: 97.7 °F (36.5 °C)  Max: 98.8 °F (37.1 °C)   BP  Min: 95/54  Max: 172/111   Pulse  Min: 59  Max: 85   Resp  Min: 16  Max: 18   SpO2  Min: 94 %  Max: 99 %   No data recorded   No data recorded      Vitals             10/14/23  0812   Weight: 119 kg (263 lb)               Intake/Output Summary (Last 24 hours) at 10/15/2023 0959  Last data filed at 10/14/2023 1654      Gross per 24 hour   Intake 360 ml   Output --   Net 360 ml            Physical Exam:     General Appearance:    Alert, cooperative, in no acute distress   HEENT:    Normocephalic. Atraumatic. ASLTY.   Lungs:     Clear to auscultation, respirations regular, even and unlabored    Heart:    Regular rhythm and normal rate, normal S1 and S2, no murmur, no gallop, no rub, no click   Abdomen:     Normal bowel sounds, obese, soft non-tender, non-distended   Extremities:   Moves all extremities, no edema, no cyanosis, no redness   Pulses:   Pulses palpable and equal bilaterally   Skin:   No bleeding, bruising or rash   Neurologic:   Grossly intact         Results Review:   Lab Results (last 72 hours)         Procedure Component Value Units Date/Time     Antithrombin III [[id number]]  (Normal) Collected: 10/14/23 1236     Specimen: Blood Updated: 10/14/23 1345       Antithrombin Activity 115 %       Lupus Anticoagulant [[id number]] Collected: 10/14/23 1236      Specimen: Blood Updated: 10/14/23 1302     Protein C Antigen, Total [[id number]] Collected: 10/14/23 1236     Specimen: Blood Updated: 10/14/23 1302     Protein S Antigen, Total [[id number]] Collected: 10/14/23 1236     Specimen: Blood Updated: 10/14/23 1302     STACI [[id number]] Collected: 10/14/23 1236     Specimen: Blood Updated: 10/14/23 1301     Anticardiolipin Antibody, IgG / M, Qn [[id number]] Collected: 10/14/23 1236     Specimen: Blood Updated: 10/14/23 1301     Factor II, DNA Analysis [[id number]] Collected: 10/14/23 1236     Specimen: Blood Updated: 10/14/23 1300     Factor 5 Leiden [[id number]] Collected: 10/14/23 1237     Specimen: Blood Updated: 10/14/23 1300     High Sensitivity Troponin T 2Hr [[id number]]  (Normal) Collected: 10/14/23 0958     Specimen: Blood Updated: 10/14/23 1024       HS Troponin T 9 ng/L         Troponin T Delta -1 ng/L       Narrative:       High Sensitive Troponin T Reference Range:  <10.0 ng/L- Negative Female for AMI  <15.0 ng/L- Negative Male for AMI  >=10 - Abnormal Female indicating possible myocardial injury.  >=15 - Abnormal Male indicating possible myocardial injury.   Clinicians would have to utilize clinical acumen, EKG, Troponin, and serial changes to determine if it is an Acute Myocardial Infarction or myocardial injury due to an underlying chronic condition.           T4, Free [[id number]]  (Abnormal) Collected: 10/14/23 0821     Specimen: Blood Updated: 10/14/23 0933       Free T4 0.36 ng/dL       Narrative:       Results may be falsely increased if patient taking Biotin.        TSH [[id number]]  (Abnormal) Collected: 10/14/23 0821     Specimen: Blood Updated: 10/14/23 0932       TSH 81.470 uIU/mL       [place] [facility] [[id number]] Collected: 10/14/23 0821     Specimen: Blood Updated: 10/14/23 0930     Narrative:       The following orders were created for panel order [place] [facility].  Procedure                               Abnormality         Status                      ---------                               -----------         ------                     Green Top (Gel)[[id number]]                                  Final result               Lavender Top[[id number]]                                     Final result               Red Top[[id number]]                                          Final result               Light Blue Top[[id number]]                                   Final result                  Please view results for these tests on the individual orders.     Green Top (Gel) [[id number]] Collected: 10/14/23 0821     Specimen: Blood Updated: 10/14/23 0930       Extra Tube Hold for add-ons.       Comment: Auto resulted.        Lavender Top [[id number]] Collected: 10/14/23 0821     Specimen: Blood Updated: 10/14/23 0930       Extra Tube hold for add-on       Comment: Auto resulted        Red Top [[id number]] Collected: 10/14/23 0821     Specimen: Blood Updated: 10/14/23 0930       Extra Tube Hold for add-ons.       Comment: Auto resulted.        Light Blue Top [[id number]] Collected: 10/14/23 0821     Specimen: Blood Updated: 10/14/23 0930       Extra Tube Hold for add-ons.       Comment: Auto resulted        BNP [[id number]]  (Normal) Collected: 10/14/23 0821     Specimen: Blood Updated: 10/14/23 0906       proBNP 83.2 pg/mL       Narrative:       This assay is used as an aid in the diagnosis of individuals suspected of having heart failure. It can be used as an aid in the diagnosis of acute decompensated heart failure (ADHF) in patients presenting with signs and symptoms of ADHF to the emergency department (ED). In addition, NT-proBNP of <300 pg/mL indicates ADHF is not likely.     Age Range         Result Interpretation  NT-proBNP Concentration (pg/mL:        <50             Positive            >450                         Fisher                           300-450                           Negative               <300     50-75           Positive            >900                  Fisher  "               300-900                  Negative            <300        >75             Positive            >1800                  Gray                300-1800                  Negative            <300     D-dimer, Quantitative [[id number]]  (Abnormal) Collected: 10/14/23 0821     Specimen: Blood Updated: 10/14/23 0902       D-Dimer, Quantitative 3.31 MCGFEU/mL       Narrative:       According to the assay 's published package insert, a normal (<0.50 MCGFEU/mL) D-dimer result in conjunction with a non-high clinical probability assessment, excludes deep vein thrombosis (DVT) and pulmonary embolism (PE) with high sensitivity.     D-dimer values increase with age and this can make VTE exclusion of an older population difficult. To address this, the [employer], based on best available evidence and recent guidelines, recommends that clinicians use age-adjusted D-dimer thresholds in patients greater than 50 years of age with: a) a low probability of PE who do not meet all Pulmonary Embolism Rule Out Criteria, or b) in those with intermediate probability of PE.   The formula for an age-adjusted D-dimer cut-off is \"age/100\".  For example, a 60 year old patient would have an age-adjusted cut-off of 0.60 MCGFEU/mL and an 80 year old 0.80 MCGFEU/mL.     Lipase [[id number]]  (Normal) Collected: 10/14/23 0821     Specimen: Blood Updated: 10/14/23 0902       Lipase 35 U/L       Comprehensive Metabolic Panel [[id number]]  (Abnormal) Collected: 10/14/23 0821     Specimen: Blood Updated: 10/14/23 0848       Glucose 146 mg/dL         BUN 12 mg/dL         Creatinine 1.08 mg/dL         Sodium 140 mmol/L         Potassium 4.1 mmol/L         Chloride 102 mmol/L         CO2 25.0 mmol/L         Calcium 8.7 mg/dL         Total Protein 6.7 g/dL         Albumin 4.2 g/dL         ALT (SGPT) 40 U/L         AST (SGOT) 25 U/L         Alkaline Phosphatase 112 U/L         Total Bilirubin 0.4 mg/dL         Globulin 2.5 " gm/dL         A/G Ratio 1.7 g/dL         BUN/Creatinine Ratio 11.1       Anion Gap 13.0 mmol/L         eGFR 58.9 mL/min/1.73       Narrative:       GFR Normal >60  Chronic Kidney Disease <60  Kidney Failure <15        High Sensitivity Troponin T [[id number]]  (Abnormal) Collected: 10/14/23 0821     Specimen: Blood Updated: 10/14/23 0845       HS Troponin T 10 ng/L       Narrative:       High Sensitive Troponin T Reference Range:  <10.0 ng/L- Negative Female for AMI  <15.0 ng/L- Negative Male for AMI  >=10 - Abnormal Female indicating possible myocardial injury.  >=15 - Abnormal Male indicating possible myocardial injury.   Clinicians would have to utilize clinical acumen, EKG, Troponin, and serial changes to determine if it is an Acute Myocardial Infarction or myocardial injury due to an underlying chronic condition.           CBC & Differential [[id number]]  (Abnormal) Collected: 10/14/23 0821     Specimen: Blood Updated: 10/14/23 0829     Narrative:       The following orders were created for panel order CBC & Differential.  Procedure                               Abnormality         Status                     ---------                               -----------         ------                     CBC Auto Differential[[id number]]        Abnormal            Final result                  Please view results for these tests on the individual orders.     CBC Auto Differential [[id number]]  (Abnormal) Collected: 10/14/23 0821     Specimen: Blood Updated: 10/14/23 0829       WBC 7.15 10*3/mm3         RBC 4.92 10*6/mm3         Hemoglobin 14.6 g/dL         Hematocrit 45.2 %         MCV 91.9 fL         MCH 29.7 pg         MCHC 32.3 g/dL         RDW 14.6 %         RDW-SD 49.1 fl         MPV 9.3 fL         Platelets 281 10*3/mm3         Neutrophil % 69.7 %         Lymphocyte % 16.1 %         Monocyte % 7.8 %         Eosinophil % 5.3 %         Basophil % 0.4 %         Immature Grans % 0.7 %         Neutrophils, Absolute 4.98  10*3/mm3         Lymphocytes, Absolute 1.15 10*3/mm3         Monocytes, Absolute 0.56 10*3/mm3         Eosinophils, Absolute 0.38 10*3/mm3         Basophils, Absolute 0.03 10*3/mm3         Immature Grans, Absolute 0.05 10*3/mm3         nRBC 0.0 /100 WBC                     Medication Review: yes  Current Medications             Current Facility-Administered Medications   Medication Dose Route Frequency Provider Last Rate Last Admin    acetaminophen (TYLENOL) tablet 650 mg  650 mg Oral Q6H PRN Cornelio Steinberg MD        atorvastatin (LIPITOR) tablet 10 mg  10 mg Oral Nightly Cornelio Steinberg MD   10 mg at 10/14/23 2100    budesonide (PULMICORT) nebulizer solution 0.5 mg  0.5 mg Nebulization BID - RT Cornelio Steinberg MD   0.5 mg at 10/15/23 0646    busPIRone (BUSPAR) tablet 5 mg  5 mg Oral TID Cornelio Steinberg MD   5 mg at 10/15/23 0804    Enoxaparin Sodium (LOVENOX) syringe 120 mg  1 mg/kg Subcutaneous Q12H Cornelio Steinberg MD   120 mg at 10/14/23 2100    escitalopram (LEXAPRO) tablet 5 mg  5 mg Oral Daily Cornelio Steinberg MD   5 mg at 10/15/23 0804    ipratropium-albuterol (DUO-NEB) nebulizer solution 3 mL  3 mL Nebulization 4x Daily - RT Cornelio Steinberg MD   3 mL at 10/15/23 0640    levothyroxine (SYNTHROID, LEVOTHROID) tablet 125 mcg  125 mcg Oral Q AM Cornelio Steinberg MD   125 mcg at 10/15/23 0634    metoclopramide (REGLAN) tablet 5 mg  5 mg Oral 4x Daily AC & at Bedtime Cornelio Steinberg MD   5 mg at 10/15/23 0804    Pharmacy to Dose enoxaparin (LOVENOX)   Does not apply Continuous PRN Cornelio Steinberg MD        sodium chloride 0.9 % flush 10 mL  10 mL Intravenous PRN Cornelio Steinberg MD                    Facility-Administered Medications Ordered in Other Encounters   Medication Dose Route Frequency Provider Last Rate Last Admin    ondansetron (ZOFRAN) injection 2 mg  2 mg Intravenous Once Harry Cerrato MD                          Assessment & Plan  Pulmonary embolus, RLL without heart strain - Noted on CTA chest. Venous duplex of BLE - preliminary report of no DVT. On full-dose lovenox. Coag panel pending.     Hx Sustained SVT - S/P ablation on 05/18/2017 by Dr. Krueger at [place]. She was seen in the office by Dr. Elizondo on 09/12/2023 - pacer interrogation demonstrated findings compatible with sustained SVT consistent with mid RP tachycardia.  Patient had EP study by Dr. Elizondo on 10/09/2023 with no inducible SVT. Patient was started on flecainide 50 mg BID after procedure. She stated that she started having symptoms of dizziness, dyspnea and chest pain the following day. Will discontinue flecainide. Continue telemetry monitoring.    Hx thyroid cancer - S/P total thyroidectomy - TSH 81.470, free T4 0.36. On levothyroxine 125 mcg daily. Defer management to attending.    Acid reflux - on PPI    MARINO on CPAP - noted    Morbid obesity due to excess calories - complicates care     Patient stated symptoms have mildly improved. Her symptoms could be related to PE, but will continue to hold flecainide.      She is stable from cardiology standpoint for discharge and to keep follow-up appointment with TAMMIE Hansen with cardiology tomorrow.      TAMMIE Hammer  10/15/23  09:59 CDT        3 1213 97.7 (36.5) 59 18 106/72 Sitting room air 97   10/14/23 1134 -- -- -- -- -- room air --   10/14/23 10:45:47 98.3 (36.8) 67 18 107/78 -- room air 96   10/14/23 1022 -- 60 -- 172/111 Abnormal  -- -- --   10/14/23 1001 -- 60 -- 164/104 Abnormal  -- -- 99   10/14/23 0946 -- 60 -- 177/102 Abnormal  -- -- 99   10/14/23 0926 -- 60 -- 160/107 Abnormal  -- -- 98   10/14/23 0915 -- 61 -- 145/99 -- -- 98   10/14/23 0900 -- 61 -- 143/108 Abnormal  -- -- 98   10/14/23 0812 98.5 (36.9) 66 18 166/101 Abnormal  -- room air 99     [facility]                             Cosigned by: Fito Payne DO at 10/15/23 6250      Encounter  Date    10/14/23    CT Angiogram Chest [[id number]] (Order [id number])  Order  Status: Final result     Study Notes     Tomi Duvall R.T.(R) on 10/14/2023  9:46 AM CDT   Dlp 550 mgycm  pt reports cp starting this morning with and episode of vomitting. pt reports some SOA too. hx of PPM in January for sick sinus syndrome/ third degree heart block. pt did start flecinide recently.     Appointment Information    PACS Images     Radiology Images  Study Result    Narrative & Impression   EXAMINATION: CT ANGIOGRAM CHEST-      10/14/2023 9:28 AM CDT     HISTORY: Status post electrophysiological study. Vomiting and shortness  of breath. History of third-degree heart block.     In order to have a CT radiation dose as low as reasonably achievable  Automated Exposure Control was utilized for adjustment of the mA and/or  KV according to patient size.     DLP in mGycm= 550.        CT Angiogram Chest with IV contrast.  CT angiography protocol.  CT imaging with bolus IV contrast injection.  Under concurrent supervision axial, sagittal, coronal,  three-dimensional, and MIP data sets were constructed on an independent  work station.     Comparison is made with a chest CTA exam from 11/11/2022.     Heart size is normal.  Normal size thoracic aorta.  No aneurysm or dissection.     Normal size and well-opacified pulmonary arteries.  Small pulmonary emboli are present within lower lobe branches of the  RIGHT pulmonary artery.  Clot burden is low.  There is no RIGHT heart strain.     Regarding the recent heart procedure there is no pericardial thickening  or fluid.  There is a normal CT appearance of the esophagus.  No mediastinal air or fluid to indicate esophageal inflammation or  perforation.     Fully expanded lungs are essentially clear.  There is mild chronic atelectasis or scarring within the lingula and  adjacent LEFT lower lobe.  No pneumonia, pneumothorax, or pleural effusion.     Surgical clips within the upper abdomen related  to liver resection.     IMPRESSION:  1. A few RIGHT lower lobe pulmonary emboli are present.  Low clot burden.  No RIGHT heart strain.  2. No cardiac wall injury or esophageal perforation.                                         This report was signed and finalized on 10/14/2023 9:54 AM CDT by Dr. Luis Miguel Melendez MD.          No current facility-administered medications for this encounter.     Current Outpatient Medications   Medication Sig Dispense Refill    albuterol sulfate  (90 Base) MCG/ACT inhaler 2 puffs 2 (Two) Times a Day.      atorvastatin (LIPITOR) 10 MG tablet Take 1 tablet by mouth Every Night.      busPIRone (BUSPAR) 5 MG tablet Take 1 tablet by mouth 3 (Three) Times a Day.      escitalopram (LEXAPRO) 5 MG tablet Take 1 tablet by mouth Daily.      Flovent  MCG/ACT inhaler INHALE 2 PUFFS INTO THE LUNGS TWICE A DAY FOR 90 DAYS      Humira Pen 40 MG/0.4ML Pen-injector Kit       levothyroxine (SYNTHROID, LEVOTHROID) 125 MCG tablet Take 1 tablet by mouth Every Morning for 30 days. 30 tablet 0    losartan (COZAAR) 25 MG tablet Take 1 tablet by mouth Daily.      metoclopramide (REGLAN) 5 MG tablet Take 1 tablet by mouth 4 (Four) Times a Day Before Meals & at Bedtime.      metoprolol tartrate (LOPRESSOR) 50 MG tablet Take 1 tablet by mouth 2 (Two) Times a Day. 180 tablet 3    montelukast (SINGULAIR) 10 MG tablet Take 1 tablet by mouth Every Night.      tiotropium bromide monohydrate (Spiriva Respimat) 2.5 MCG/ACT aerosol solution inhaler Inhale 2 puffs Daily.      acetaminophen (Tylenol) 325 MG tablet Take 2 tablets by mouth Every 6 (Six) Hours As Needed for Mild Pain or Moderate Pain (at pacer site).      Apixaban Starter Pack (Eliquis DVT/PE Starter Pack) tablet therapy pack Take two 5 mg tablets by mouth every 12 hours for 7 days. Followed by one 5 mg tablet every 12 hours. (Dispense starter pack if available) 74 tablet 0    ondansetron ODT (ZOFRAN-ODT) 4 MG disintegrating tablet Place 1 tablet  on the tongue 4 (Four) Times a Day As Needed for Nausea or Vomiting. 12 tablet 0     Facility-Administered Medications Ordered in Other Encounters   Medication Dose Route Frequency Provider Last Rate Last Admin    ondansetron (ZOFRAN) injection 2 mg  2 mg Intravenous Once Harry Cerrato MD

## 2023-10-17 ENCOUNTER — OFFICE VISIT (OUTPATIENT)
Dept: CARDIOLOGY | Facility: CLINIC | Age: 58
End: 2023-10-17
Payer: COMMERCIAL

## 2023-10-17 VITALS
SYSTOLIC BLOOD PRESSURE: 98 MMHG | OXYGEN SATURATION: 98 % | WEIGHT: 263 LBS | DIASTOLIC BLOOD PRESSURE: 70 MMHG | HEIGHT: 63 IN | BODY MASS INDEX: 46.6 KG/M2 | HEART RATE: 79 BPM

## 2023-10-17 DIAGNOSIS — R06.02 SHORTNESS OF BREATH: Primary | ICD-10-CM

## 2023-10-17 DIAGNOSIS — I47.10 SUSTAINED SVT: Chronic | ICD-10-CM

## 2023-10-17 DIAGNOSIS — I26.99 OTHER ACUTE PULMONARY EMBOLISM WITHOUT ACUTE COR PULMONALE: ICD-10-CM

## 2023-10-17 DIAGNOSIS — R07.9 CHEST PAIN, UNSPECIFIED TYPE: ICD-10-CM

## 2023-10-17 DIAGNOSIS — I49.5 SICK SINUS SYNDROME: ICD-10-CM

## 2023-10-17 LAB
ANA SER QL: NEGATIVE
CARDIOLIPIN IGG SER IA-ACNC: <9 GPL U/ML (ref 0–14)
CARDIOLIPIN IGM SER IA-ACNC: <9 MPL U/ML (ref 0–12)

## 2023-10-17 NOTE — PAYOR COMM NOTE
"DC HOME 10-15-23  [id number]     Ana Small (58 y.o. Female)       Date of Birth   1965    Social Security Number       Address   [address] [place] [address]    Home Phone   [phone]    MRN   [id number]       [facility]    Marital Status                               Admission Date   10/14/23    Admission Type   Emergency    Admitting Provider   Graham Townsend DO    Attending Provider       Department, Room/Bed   [facility] 4B, 441/1       Discharge Date   10/15/2023    Discharge Disposition   Home or Self Care    Discharge Destination                                 Attending Provider: (none)   Allergies: Latex, Sulfa Antibiotics    Isolation: None   Infection: None   Code Status: Prior    Ht: 160 cm (63\")   Wt: 119 kg (263 lb)    Admission Cmt: None   Principal Problem: Pulmonary embolus [I26.99]                   Active Insurance as of 10/14/2023       Primary Coverage       Payor Plan Insurance Group Employer/Plan Group    [employer] BLUE [employer]OSS [employer] BLUE CROSS BLUE [employer] PPO [id number]       Payor Plan Address Payor Plan Phone Number Payor Plan Fax Number Effective Dates    [address] [phone]  1/1/2017 - None Entered    [place] [address]         Subscriber Name Subscriber Birth Date Member ID       GALINDO SMALL [birth year]1962 [id number]                     Emergency Contacts        (Rel.) Home Phone Work Phone Mobile Phone    Galindo Small (Spouse) [phone] -- --                 Discharge Summary        Loren Russell APRN at 10/15/23 1033       Attestation signed by Graham Townsend DO at 10/15/23 1311    This visit was performed by both a physician and an APC. I personally evaluated and examined the patient. I performed all aspects of the MDM as documented.    The patient has been fully anticoagulated and is doing well today.  She has no oxygen requirement and is appropriate for discharge home.    HRRR  LCTAB  ABD " benign    Electronically signed by Graham Townsend DO, 10/15/2023, 13:11 CDT.                          [facility]  DISCHARGE SUMMARY       Date of Admission: 10/14/2023  Date of Discharge:  10/15/2023  Primary Care Physician: Camilla Ellison MD    Presenting Problem/History of Present Illness:  Chest discomfort    Final Discharge Diagnoses:  Active Hospital Problems    Diagnosis     **Pulmonary embolus     MARINO on CPAP     Acid reflux     Morbid obesity due to excess calories     Seizure disorder     Sustained SVT        Consults: Dr. Payne with cardiology.    Procedures Performed: none.    Pertinent Test Results:   Results for orders placed during the hospital encounter of 01/30/23    Adult Transthoracic Echo Complete W/ Cont if Necessary Per Protocol    Interpretation Summary    Left ventricular systolic function is normal. Left ventricular ejection fraction appears to be 61 - 65%.    Left ventricular wall thickness is consistent with mild concentric hypertrophy.    Left ventricular diastolic function was normal.    Estimated right ventricular systolic pressure from tricuspid regurgitation is normal (<35 mmHg).      Imaging Results (All)       Procedure Component Value Units Date/Time    US Venous Doppler Lower Extremity Bilateral (duplex) [[id number]] Resulted: 10/14/23 1328     Updated: 10/14/23 1347    CT Angiogram Chest [[id number]] Collected: 10/14/23 0947     Updated: 10/14/23 0957    Narrative:      EXAMINATION: CT ANGIOGRAM CHEST-      10/14/2023 9:28 AM CDT     HISTORY: Status post electrophysiological study. Vomiting and shortness  of breath. History of third-degree heart block.     In order to have a CT radiation dose as low as reasonably achievable  Automated Exposure Control was utilized for adjustment of the mA and/or  KV according to patient size.     DLP in mGycm= 550.        CT Angiogram Chest with IV contrast.  CT angiography protocol.  CT  imaging with bolus IV contrast injection.  Under concurrent supervision axial, sagittal, coronal,  three-dimensional, and MIP data sets were constructed on an independent  work station.     Comparison is made with a chest CTA exam from 11/11/2022.     Heart size is normal.  Normal size thoracic aorta.  No aneurysm or dissection.     Normal size and well-opacified pulmonary arteries.  Small pulmonary emboli are present within lower lobe branches of the  RIGHT pulmonary artery.  Clot burden is low.  There is no RIGHT heart strain.     Regarding the recent heart procedure there is no pericardial thickening  or fluid.  There is a normal CT appearance of the esophagus.  No mediastinal air or fluid to indicate esophageal inflammation or  perforation.     Fully expanded lungs are essentially clear.  There is mild chronic atelectasis or scarring within the lingula and  adjacent LEFT lower lobe.  No pneumonia, pneumothorax, or pleural effusion.     Surgical clips within the upper abdomen related to liver resection.       Impression:      1. A few RIGHT lower lobe pulmonary emboli are present.  Low clot burden.  No RIGHT heart strain.  2. No cardiac wall injury or esophageal perforation.                                         This report was signed and finalized on 10/14/2023 9:54 AM CDT by Dr. Luis Miguel Melendez MD.       XR Chest 1 View [[id number]] Collected: 10/14/23 0925     Updated: 10/14/23 0929    Narrative:      EXAMINATION: XR CHEST 1 VW-     10/14/2023 8:37 AM CDT     HISTORY: Chest pain.     COMPARISON:  1/30/2023 1 view chest x-ray.     1 view chest x-ray.     Stable heart size.  Aortic arch calcification.  2-lead LEFT cardiac pacer.     Fully expanded lungs.     No pneumonia, pneumothorax, or congestive failure.       Impression:      1. No focal acute lung disease.     This report was signed and finalized on 10/14/2023 9:26 AM CDT by Dr. Luis Miguel Melendez MD.             LAB RESULTS:      Lab 10/14/23  0880  10/09/23  0811   WBC 7.15 5.77   HEMOGLOBIN 14.6 14.5   HEMATOCRIT 45.2 45.9   PLATELETS 281 286   NEUTROS ABS 4.98 3.66   IMMATURE GRANS (ABS) 0.05 0.03   LYMPHS ABS 1.15 1.28   MONOS ABS 0.56 0.45   EOS ABS 0.38 0.30   MCV 91.9 91.8   PROTIME  --  11.9   D DIMER QUANT 3.31*  --          Lab 10/14/23  0821 10/09/23  0811   SODIUM 140 141   POTASSIUM 4.1 4.1   CHLORIDE 102 102   CO2 25.0 26.0   ANION GAP 13.0 13.0   BUN 12 19   CREATININE 1.08* 1.04*   EGFR 58.9* 61.7   GLUCOSE 146* 150*   CALCIUM 8.7 8.7   TSH 81.470*  --          Lab 10/14/23  0821   TOTAL PROTEIN 6.7   ALBUMIN 4.2   GLOBULIN 2.5   ALT (SGPT) 40*   AST (SGOT) 25   BILIRUBIN 0.4   ALK PHOS 112   LIPASE 35         Lab 10/14/23  0958 10/14/23  0821 10/09/23  0811   PROBNP  --  83.2  --    HSTROP T 9 10*  --    PROTIME  --   --  11.9   INR  --   --  0.86*                 Brief Urine Lab Results       None          Microbiology Results (last 10 days)       ** No results found for the last 240 hours. **            Hospital Course:   Ms. Small is a 58-year-old female who presented to [facility] on 10/14 with chest discomfort in the substernal area with shortness of breath and nausea and vomiting.  She has a history of SVT, sinus node dysfunction and underwent previous SVT ablation with Dr. Krueger in May 2017.  She continued to have episodes where she felt poorly with symptoms lasting all day in duration.  She recently underwent permanent pacemaker implant for sinus node dysfunction. Her device interrogations have shown that she has frequent prolonged episodes where her heart rates are staying around 150 bpm in a sustained manner lasting for several hours in duration.  She was referred to EP and had redo SVT ablation on October 9, 2023.  No inducible SVT with or without high-dose isoproterenol. She was started on flecainide 50 mg twice daily.  Since starting the medication, patient has noticed increased dizziness, shortness of breath and chest  pain which prompted her to present to the ED for further evaluation.  In the emergency department, high-sensitivity troponin 10 with follow-up 9, troponin T delta -1.  BNP normal.  Dimer was elevated which prompted CTA chest.  CTA chest revealed few right lower lobe pulmonary emboli, low clot burden.  No right heart strain.  She was started on therapeutic Lovenox.    Pulmonary embolus, right lower lobe without heart strain noted on CTA chest.  Venous duplex showed no thrombus.  Patient states she has a family history of multiple clots.  Hypercoagulable panel sent off.  She may need hematology consult as outpatient.  She was started on therapeutic Lovenox.  We will transition to Eliquis 5 mg twice daily x7 days followed by 5 mg twice daily.    Cardiology consulted for recommendations of flecainide with patient's symptom complaints.  Cardiology recommends to discontinue flecainide at this time.  Sinus 63-82 on telemetry.  She is okay for discharge from cardiology standpoint with close follow-up with TAMMIE Hansen with cardiology tomorrow.    Blood pressure stable with last reading 134/89.  Recommend to keep a log of blood pressure readings and bring to follow-up appointment with primary care provider.    Hypothyroidism with TSH 81.470, free T4 0.36.  She tells me that she has been out of her medication levothyroxine 125 mcg daily for about 2 weeks.  She said that her  picked up medication but now she has been unable to find bottle.  She tells me that she has not had time to call her primary care provider, Dr. Ellison for another prescription.  I will send in a prescription for levothyroxine 125 mcg daily to [facility] in [place].  Strongly encouraged to take medication as prescribed with no missed doses.  She will need repeat thyroid function test in 4 to 6 weeks as outpatient.    She has reached maximum benefit of hospitalization.  She is medically stable and appropriate for discharge today.    Physical Exam  "on Discharge:  /89 (BP Location: Right arm, Patient Position: Sitting)   Pulse 103   Temp 98.7 °F (37.1 °C) (Oral)   Resp 17   Ht 160 cm (63\")   Wt 119 kg (263 lb)   SpO2 97%   BMI 46.59 kg/m²   Physical Exam  Vitals reviewed.   Constitutional:       General: She is not in acute distress.     Appearance: She is morbidly obese. She is not toxic-appearing.      Comments: Sitting up in chair.  No acute distress.  Family at bedside.  Discussed with her nurse jessee.   HENT:      Head: Normocephalic and atraumatic.      Mouth/Throat:      Mouth: Mucous membranes are moist.      Pharynx: Oropharynx is clear.   Eyes:      Extraocular Movements: Extraocular movements intact.      Conjunctiva/sclera: Conjunctivae normal.      Pupils: Pupils are equal, round, and reactive to light.   Cardiovascular:      Rate and Rhythm: Normal rate and regular rhythm.      Pulses: Normal pulses.      Comments: Sinus 63-82  Pulmonary:      Effort: Pulmonary effort is normal. No respiratory distress.      Breath sounds: Normal breath sounds.   Abdominal:      General: Bowel sounds are normal. There is no distension.      Palpations: Abdomen is soft.      Tenderness: There is no abdominal tenderness.   Musculoskeletal:         General: No swelling or tenderness. Normal range of motion.      Cervical back: Normal range of motion and neck supple. No muscular tenderness.   Skin:     General: Skin is warm and dry.      Findings: No erythema or rash.   Neurological:      General: No focal deficit present.      Mental Status: She is alert and oriented to person, place, and time.      Cranial Nerves: No cranial nerve deficit.      Motor: No weakness.   Psychiatric:         Mood and Affect: Mood normal.         Behavior: Behavior normal.       Condition on Discharge: medically stable.    Discharge Disposition:  Home or Self Care    Discharge Medications:     Discharge Medications        New Medications        Instructions Start Date "   Eliquis DVT/PE Starter Pack tablet therapy pack  Generic drug: Apixaban Starter Pack   5 mg, Oral, Take As Directed             Changes to Medications        Instructions Start Date   levothyroxine 125 MCG tablet  Commonly known as: SYNTHROID, LEVOTHROID  What changed: when to take this   125 mcg, Oral, Every Early Morning   Start Date: October 16, 2023            Continue These Medications        Instructions Start Date   acetaminophen 325 MG tablet  Commonly known as: Tylenol   650 mg, Oral, Every 6 Hours PRN      albuterol sulfate  (90 Base) MCG/ACT inhaler  Commonly known as: PROVENTIL HFA;VENTOLIN HFA;PROAIR HFA   2 puffs, 2 Times Daily      atorvastatin 10 MG tablet  Commonly known as: LIPITOR   10 mg, Oral, Nightly      busPIRone 5 MG tablet  Commonly known as: BUSPAR   5 mg, Oral, 3 Times Daily      escitalopram 5 MG tablet  Commonly known as: LEXAPRO   5 mg, Oral, Daily      Flovent  MCG/ACT inhaler  Generic drug: fluticasone   INHALE 2 PUFFS INTO THE LUNGS TWICE A DAY FOR 90 DAYS      Humira Pen 40 MG/0.4ML Pen-injector Kit  Generic drug: Adalimumab       losartan 25 MG tablet  Commonly known as: COZAAR   25 mg, Oral, Daily      metoclopramide 5 MG tablet  Commonly known as: REGLAN   5 mg, Oral, 4 Times Daily Before Meals & Nightly      metoprolol tartrate 50 MG tablet  Commonly known as: LOPRESSOR   50 mg, Oral, 2 Times Daily      montelukast 10 MG tablet  Commonly known as: SINGULAIR   10 mg, Oral, Nightly      ondansetron ODT 4 MG disintegrating tablet  Commonly known as: ZOFRAN-ODT   4 mg, Translingual, 4 Times Daily PRN      Spiriva Respimat 2.5 MCG/ACT aerosol solution inhaler  Generic drug: tiotropium bromide monohydrate   2 puffs, Inhalation, Daily - RT             Stop These Medications      flecainide 50 MG tablet  Commonly known as: TAMBOCOR              Discharge Diet:   Diet Instructions       Diet: Regular/House Diet, Cardiac Diets; Healthy Heart (2-3 Na+); Regular Texture  (IDDSI 7); Thin (IDDSI 0)      Discharge Diet:  Regular/House Diet  Cardiac Diets       Cardiac Diet: Healthy Heart (2-3 Na+)    Texture: Regular Texture (IDDSI 7)    Fluid Consistency: Thin (IDDSI 0)            Activity at Discharge:   Activity Instructions       Activity as Tolerated              Follow-up Appointments:    Follow up with Dr. Ellison in one week.  2.    Follow-up with TAMMIE Hansen with cardiology tomorrow.  3.  Follow-up with EP per recommendations.  Future Appointments   Date Time Provider Department Center   11/14/2023  9:15 AM Emilia Elizondo MD [facility] CD PAD PAD   3/14/2024  9:15 AM [facility] PAD DEVICE CHECK [facility] CD PAD PAD       Test Results Pending at Discharge: none.    Electronically signed by TAMMIE Omer, 10/15/23, 11:56 CDT.    Time: 35 minutes.           Electronically signed by Graham Townsend DO at 10/15/23 1311

## 2023-10-17 NOTE — PROGRESS NOTES
Subjective:     Encounter Date:10/17/2023      Patient ID: Ana Small is a 58 y.o. female     Chief Complaint:  History of Present Illness  Patient presents today for concerns over chest pain. Patient was admitted 10/14/23 (3 days ago) for chest pain and shortness of breath. She continues to have chest pain and shortness of breath. SHe notes she has been checking her oxygen level at home and it has been stable. She was started on Flecainide 10/9/23 but this was stopped while admitted to the hospital. She notes her palpitations/heart racing is at baseline. She notes they thought it might have been causing some bradycardia and side effects. She notes she felt no different off or on. She has been started on Eliquis and is currently taking 10 mg BID. She was diagnosed with right lung pulmonary emboli. Her troponin were 10 and 9. Her BNP was normal. She had attempted Ablation 10/9/23 with no inducible arrhythmia. Patient has sick sinus syndrome with pacemaker. She had an echo in February with LVEF 61-65% and mild LVH.     The following portions of the patient's history were reviewed and updated as appropriate: allergies, current medications, past medical history, past social history, past and problem list.    Allergies   Allergen Reactions    Latex Anaphylaxis     CALLED TO [facility] IN SURGERY SCHEDULING.    Sulfa Antibiotics Anaphylaxis       Current Outpatient Medications:     albuterol sulfate  (90 Base) MCG/ACT inhaler, 2 puffs 2 (Two) Times a Day., Disp: , Rfl:     Apixaban Starter Pack (Eliquis DVT/PE Starter Pack) tablet therapy pack, Take two 5 mg tablets by mouth every 12 hours for 7 days. Followed by one 5 mg tablet every 12 hours. (Dispense starter pack if available), Disp: 74 tablet, Rfl: 0    atorvastatin (LIPITOR) 10 MG tablet, Take 1 tablet by mouth Every Night., Disp: , Rfl:     busPIRone (BUSPAR) 5 MG tablet, Take 1 tablet by mouth 3 (Three) Times a Day., Disp: , Rfl:     escitalopram (LEXAPRO) 5  MG tablet, Take 1 tablet by mouth Daily., Disp: , Rfl:     Flovent  MCG/ACT inhaler, INHALE 2 PUFFS INTO THE LUNGS TWICE A DAY FOR 90 DAYS, Disp: , Rfl:     Humira Pen 40 MG/0.4ML Pen-injector Kit, , Disp: , Rfl:     levothyroxine (SYNTHROID, LEVOTHROID) 125 MCG tablet, Take 1 tablet by mouth Every Morning for 30 days., Disp: 30 tablet, Rfl: 0    losartan (COZAAR) 25 MG tablet, Take 1 tablet by mouth Daily., Disp: , Rfl:     metoclopramide (REGLAN) 5 MG tablet, Take 1 tablet by mouth 4 (Four) Times a Day Before Meals & at Bedtime., Disp: , Rfl:     metoprolol tartrate (LOPRESSOR) 50 MG tablet, Take 1 tablet by mouth 2 (Two) Times a Day., Disp: 180 tablet, Rfl: 3    montelukast (SINGULAIR) 10 MG tablet, Take 1 tablet by mouth Every Night., Disp: , Rfl:     tiotropium bromide monohydrate (Spiriva Respimat) 2.5 MCG/ACT aerosol solution inhaler, Inhale 2 puffs Daily., Disp: , Rfl:   No current facility-administered medications for this visit.    Facility-Administered Medications Ordered in Other Visits:     ondansetron (ZOFRAN) injection 2 mg, 2 mg, Intravenous, Once, Harry Cerrato MD    Social History     Socioeconomic History    Marital status:     Number of children: 2   Tobacco Use    Smoking status: Former     Packs/day: 2.00     Years: 15.00     Additional pack years: 0.00     Total pack years: 30.00     Types: Cigarettes     Quit date: 2016     Years since quittin.1    Smokeless tobacco: Never    Tobacco comments:     Off/on since age 27. Quit for years at a time.   Vaping Use    Vaping Use: Never used   Substance and Sexual Activity    Alcohol use: No    Drug use: No    Sexual activity: Yes     Partners: Male     Birth control/protection: Surgical, Post-menopausal, Bilateral salpingectomy        Review of Systems   Constitutional: Positive for malaise/fatigue. Negative for chills, decreased appetite, fever, weight gain and weight loss.   HENT:  Negative for nosebleeds.    Eyes:  Negative  for visual disturbance.   Cardiovascular:  Positive for chest pain, dyspnea on exertion and palpitations. Negative for leg swelling, near-syncope, orthopnea, paroxysmal nocturnal dyspnea and syncope.   Respiratory:  Positive for shortness of breath. Negative for cough, hemoptysis and snoring.    Endocrine: Negative for cold intolerance and heat intolerance.   Hematologic/Lymphatic: Negative for bleeding problem. Does not bruise/bleed easily.   Skin:  Negative for rash.   Musculoskeletal:  Negative for back pain and falls.   Gastrointestinal:  Negative for abdominal pain, constipation, diarrhea, heartburn, melena, nausea and vomiting.   Genitourinary:  Negative for hematuria.   Neurological:  Positive for dizziness. Negative for headaches and light-headedness.   Psychiatric/Behavioral:  Negative for altered mental status.    Allergic/Immunologic: Negative for persistent infections.              Objective:     Constitutional:       Appearance: Healthy appearance. Not in distress.   Eyes:      Pupils: Pupils are equal, round, and reactive to light.   HENT:      Head: Normocephalic and atraumatic.      Nose: Nose normal.    Mouth/Throat:      Dentition: Normal.   Pulmonary:      Effort: Pulmonary effort is normal.      Breath sounds: Normal breath sounds.   Chest:      Chest wall: Not tender to palpatation.   Cardiovascular:      PMI at left midclavicular line. Normal rate. Regular rhythm.      Murmurs: There is no murmur.   Pulses:     Intact distal pulses.   Edema:     Peripheral edema absent.   Abdominal:      General: Bowel sounds are normal.      Palpations: Abdomen is soft.   Musculoskeletal: Normal range of motion.      Cervical back: Normal range of motion. Skin:     General: Skin is warm and dry.   Neurological:      Mental Status: Alert, oriented to person, place, and time and oriented to person, place and time.   Psychiatric:         Attention and Perception: Attention normal.         Mood and Affect: Mood  "normal.             ECG 12 Lead    Date/Time: 10/17/2023 1:27 PM  Performed by: Sheng Coelho APRN    Authorized by: Sheng Coelho APRN  Comparison: compared with previous ECG from 10/14/2023  Similar to previous ECG  Rhythm: sinus rhythm        BP 98/70   Pulse 79   Ht 160 cm (63\")   Wt 119 kg (263 lb)   SpO2 98%   BMI 46.59 kg/m²   Lab Review:   I have reviewed       No results found for: \"CHOL\", \"CHLPL\", \"TRIG\", \"HDL\", \"LDL\", \"LDLDIRECT\"   Results for orders placed during the hospital encounter of 01/30/23    Adult Transthoracic Echo Complete W/ Cont if Necessary Per Protocol    Interpretation Summary    Left ventricular systolic function is normal. Left ventricular ejection fraction appears to be 61 - 65%.    Left ventricular wall thickness is consistent with mild concentric hypertrophy.    Left ventricular diastolic function was normal.    Estimated right ventricular systolic pressure from tricuspid regurgitation is normal (<35 mmHg).     Assessment:          Diagnosis Plan   1. Shortness of breath  Adult Transthoracic Echo Complete w/ Color, Spectral and Contrast if necessary per protocol      2. Other acute pulmonary embolism without acute cor pulmonale        3. Sustained SVT        4. Sick sinus syndrome        5. Chest pain, unspecified type               Plan:       Shortness of breath- chronic but worse. Diagnosed with PE a few days ago. No change in symptoms but was advised to get follow up. Oxygen levels have been stable. Follow up with PCP. Check Echo. BNP was normal. Troponin 10 and 9. No acute EKG changes.   Acute Pulmonary Emboli of right lung. Chest pain and shortness of breath. Will check echo. Follow up in 1 month. Encouraged follow up with PCP for management of PE.   Sustained SVT- attempted ablation 10/9. Started on Flecainide but some concerns of side effects. Stopped while in the hospital. Follows up with Dr. Elizondo in 1 month  Sick Sinus Syndrome with pacemaker  Chest pain- overal " atypical. Will monitor. Likely secondary to PE diagnosed a few days ago. Troponin 10 and 9. No acute EKG changes. Follow up in 1 month to reassess

## 2023-10-18 LAB
PROT C AG ACT/NOR PPP IA: 141 % (ref 60–150)
PROT S AG ACT/NOR PPP IA: 123 % (ref 60–150)

## 2023-10-19 LAB
APTT SCREEN TO CONFIRM RATIO: 1.24 RATIO (ref 0–1.34)
CONFIRM APTT/NORMAL: 48.5 SEC (ref 0–47.6)
DRVVT SCREEN TO CONFIRM RATIO: 1.3 RATIO (ref 0.8–1.2)
LA 2 SCREEN W REFLEX-IMP: ABNORMAL
MIXING DRVVT: 48.1 SEC (ref 0–40.4)
SCREEN APTT: 41 SEC (ref 0–43.5)
SCREEN DRVVT: 63.1 SEC (ref 0–47)
THROMBIN TIME: 24.6 SEC (ref 0–23)

## 2023-11-01 ENCOUNTER — HOSPITAL ENCOUNTER (OUTPATIENT)
Dept: CARDIOLOGY | Facility: HOSPITAL | Age: 58
Discharge: HOME | End: 2023-11-01
Payer: COMMERCIAL

## 2024-01-11 ENCOUNTER — HOSPITAL ENCOUNTER (EMERGENCY)
Facility: HOSPITAL | Age: 59
Discharge: HOME | End: 2024-01-12
Attending: INTERNAL MEDICINE
Payer: COMMERCIAL

## 2024-01-11 ENCOUNTER — APPOINTMENT (OUTPATIENT)
Dept: GENERAL RADIOLOGY | Facility: HOSPITAL | Age: 59
End: 2024-01-11
Payer: COMMERCIAL

## 2024-01-11 ENCOUNTER — APPOINTMENT (OUTPATIENT)
Dept: CT IMAGING | Facility: HOSPITAL | Age: 59
End: 2024-01-11
Payer: COMMERCIAL

## 2024-01-11 DIAGNOSIS — R11.0 NAUSEA: ICD-10-CM

## 2024-01-11 DIAGNOSIS — R10.84 GENERALIZED ABDOMINAL PAIN: Primary | ICD-10-CM

## 2024-01-11 DIAGNOSIS — R07.9 CHEST PAIN, UNSPECIFIED TYPE: ICD-10-CM

## 2024-01-11 DIAGNOSIS — R19.7 DIARRHEA, UNSPECIFIED TYPE: ICD-10-CM

## 2024-01-11 LAB
ADV 40+41 DNA STL QL NAA+NON-PROBE: NOT DETECTED
ALBUMIN SERPL-MCNC: 4.3 G/DL (ref 3.5–5.2)
ALBUMIN/GLOB SERPL: 1.4 G/DL
ALP SERPL-CCNC: 148 U/L (ref 39–117)
ALT SERPL W P-5'-P-CCNC: 39 U/L (ref 1–33)
ANION GAP SERPL CALCULATED.3IONS-SCNC: 14 MMOL/L (ref 5–15)
AST SERPL-CCNC: 28 U/L (ref 1–32)
ASTRO TYP 1-8 RNA STL QL NAA+NON-PROBE: NOT DETECTED
B PARAPERT DNA SPEC QL NAA+PROBE: NOT DETECTED
B PERT DNA SPEC QL NAA+PROBE: NOT DETECTED
BASOPHILS # BLD AUTO: 0.05 10*3/MM3 (ref 0–0.2)
BASOPHILS NFR BLD AUTO: 0.6 % (ref 0–1.5)
BILIRUB SERPL-MCNC: <0.2 MG/DL (ref 0–1.2)
BUN SERPL-MCNC: 18 MG/DL (ref 8–23)
BUN/CREAT SERPL: 18.8 (ref 7–25)
C CAYETANENSIS DNA STL QL NAA+NON-PROBE: NOT DETECTED
C COLI+JEJ+UPSA DNA STL QL NAA+NON-PROBE: NOT DETECTED
C PNEUM DNA NPH QL NAA+NON-PROBE: NOT DETECTED
CALCIUM SPEC-SCNC: 8.9 MG/DL (ref 8.6–10.5)
CHLORIDE SERPL-SCNC: 100 MMOL/L (ref 98–107)
CO2 SERPL-SCNC: 24 MMOL/L (ref 22–29)
CREAT SERPL-MCNC: 0.96 MG/DL (ref 0.57–1)
CRYPTOSP DNA STL QL NAA+NON-PROBE: NOT DETECTED
D-LACTATE SERPL-SCNC: 1.9 MMOL/L (ref 0.5–2)
DEPRECATED RDW RBC AUTO: 46.8 FL (ref 37–54)
E HISTOLYT DNA STL QL NAA+NON-PROBE: NOT DETECTED
EAEC PAA PLAS AGGR+AATA ST NAA+NON-PRB: NOT DETECTED
EC STX1+STX2 GENES STL QL NAA+NON-PROBE: NOT DETECTED
EGFRCR SERPLBLD CKD-EPI 2021: 67.9 ML/MIN/1.73
EOSINOPHIL # BLD AUTO: 0.19 10*3/MM3 (ref 0–0.4)
EOSINOPHIL NFR BLD AUTO: 2.2 % (ref 0.3–6.2)
EPEC EAE GENE STL QL NAA+NON-PROBE: NOT DETECTED
ERYTHROCYTE [DISTWIDTH] IN BLOOD BY AUTOMATED COUNT: 14 % (ref 12.3–15.4)
ETEC LTA+ST1A+ST1B TOX ST NAA+NON-PROBE: NOT DETECTED
FLUAV SUBTYP SPEC NAA+PROBE: NOT DETECTED
FLUBV RNA ISLT QL NAA+PROBE: NOT DETECTED
G LAMBLIA DNA STL QL NAA+NON-PROBE: NOT DETECTED
GEN 5 2HR TROPONIN T REFLEX: 9 NG/L
GLOBULIN UR ELPH-MCNC: 3.1 GM/DL
GLUCOSE SERPL-MCNC: 249 MG/DL (ref 65–99)
HADV DNA SPEC NAA+PROBE: NOT DETECTED
HCOV 229E RNA SPEC QL NAA+PROBE: NOT DETECTED
HCOV HKU1 RNA SPEC QL NAA+PROBE: NOT DETECTED
HCOV NL63 RNA SPEC QL NAA+PROBE: NOT DETECTED
HCOV OC43 RNA SPEC QL NAA+PROBE: NOT DETECTED
HCT VFR BLD AUTO: 46.3 % (ref 34–46.6)
HGB BLD-MCNC: 14.7 G/DL (ref 12–15.9)
HMPV RNA NPH QL NAA+NON-PROBE: NOT DETECTED
HOLD SPECIMEN: NORMAL
HOLD SPECIMEN: NORMAL
HPIV1 RNA ISLT QL NAA+PROBE: NOT DETECTED
HPIV2 RNA SPEC QL NAA+PROBE: NOT DETECTED
HPIV3 RNA NPH QL NAA+PROBE: NOT DETECTED
HPIV4 P GENE NPH QL NAA+PROBE: NOT DETECTED
IMM GRANULOCYTES # BLD AUTO: 0.06 10*3/MM3 (ref 0–0.05)
IMM GRANULOCYTES NFR BLD AUTO: 0.7 % (ref 0–0.5)
LIPASE SERPL-CCNC: 45 U/L (ref 13–60)
LYMPHOCYTES # BLD AUTO: 1.43 10*3/MM3 (ref 0.7–3.1)
LYMPHOCYTES NFR BLD AUTO: 16.9 % (ref 19.6–45.3)
M PNEUMO IGG SER IA-ACNC: NOT DETECTED
MAGNESIUM SERPL-MCNC: 2.8 MG/DL (ref 1.6–2.4)
MCH RBC QN AUTO: 29.1 PG (ref 26.6–33)
MCHC RBC AUTO-ENTMCNC: 31.7 G/DL (ref 31.5–35.7)
MCV RBC AUTO: 91.5 FL (ref 79–97)
MONOCYTES # BLD AUTO: 0.48 10*3/MM3 (ref 0.1–0.9)
MONOCYTES NFR BLD AUTO: 5.7 % (ref 5–12)
NEUTROPHILS NFR BLD AUTO: 6.26 10*3/MM3 (ref 1.7–7)
NEUTROPHILS NFR BLD AUTO: 73.9 % (ref 42.7–76)
NOROVIRUS GI+II RNA STL QL NAA+NON-PROBE: NOT DETECTED
NRBC BLD AUTO-RTO: 0 /100 WBC (ref 0–0.2)
P SHIGELLOIDES DNA STL QL NAA+NON-PROBE: NOT DETECTED
PLATELET # BLD AUTO: 448 10*3/MM3 (ref 140–450)
PMV BLD AUTO: 9.2 FL (ref 6–12)
POTASSIUM SERPL-SCNC: 4 MMOL/L (ref 3.5–5.2)
PROT SERPL-MCNC: 7.4 G/DL (ref 6–8.5)
RBC # BLD AUTO: 5.06 10*6/MM3 (ref 3.77–5.28)
RHINOVIRUS RNA SPEC NAA+PROBE: NOT DETECTED
RSV RNA NPH QL NAA+NON-PROBE: NOT DETECTED
RVA RNA STL QL NAA+NON-PROBE: NOT DETECTED
S ENT+BONG DNA STL QL NAA+NON-PROBE: NOT DETECTED
SAPO I+II+IV+V RNA STL QL NAA+NON-PROBE: NOT DETECTED
SARS-COV-2 RNA NPH QL NAA+NON-PROBE: NOT DETECTED
SHIGELLA SP+EIEC IPAH ST NAA+NON-PROBE: NOT DETECTED
SODIUM SERPL-SCNC: 138 MMOL/L (ref 136–145)
TROPONIN T DELTA: -3 NG/L
TROPONIN T SERPL HS-MCNC: 12 NG/L
V CHOL+PARA+VUL DNA STL QL NAA+NON-PROBE: NOT DETECTED
V CHOLERAE DNA STL QL NAA+NON-PROBE: NOT DETECTED
WBC NRBC COR # BLD AUTO: 8.47 10*3/MM3 (ref 3.4–10.8)
WHOLE BLOOD HOLD COAG: NORMAL
WHOLE BLOOD HOLD SPECIMEN: NORMAL
Y ENTEROCOL DNA STL QL NAA+NON-PROBE: NOT DETECTED

## 2024-01-11 PROCEDURE — 99285 EMERGENCY DEPT VISIT HI MDM: CPT

## 2024-01-11 PROCEDURE — 83735 ASSAY OF MAGNESIUM: CPT | Performed by: INTERNAL MEDICINE

## 2024-01-11 PROCEDURE — 25010000002 KETOROLAC TROMETHAMINE PER 15 MG: Performed by: INTERNAL MEDICINE

## 2024-01-11 PROCEDURE — 93005 ELECTROCARDIOGRAM TRACING: CPT | Performed by: INTERNAL MEDICINE

## 2024-01-11 PROCEDURE — 83605 ASSAY OF LACTIC ACID: CPT | Performed by: INTERNAL MEDICINE

## 2024-01-11 PROCEDURE — 93010 ELECTROCARDIOGRAM REPORT: CPT | Performed by: INTERNAL MEDICINE

## 2024-01-11 PROCEDURE — 25510000001 IOPAMIDOL 61 % SOLUTION: Performed by: INTERNAL MEDICINE

## 2024-01-11 PROCEDURE — 84484 ASSAY OF TROPONIN QUANT: CPT | Performed by: INTERNAL MEDICINE

## 2024-01-11 PROCEDURE — 87507 IADNA-DNA/RNA PROBE TQ 12-25: CPT | Performed by: INTERNAL MEDICINE

## 2024-01-11 PROCEDURE — 25010000002 PROCHLORPERAZINE 10 MG/2ML SOLUTION: Performed by: INTERNAL MEDICINE

## 2024-01-11 PROCEDURE — 74177 CT ABD & PELVIS W/CONTRAST: CPT

## 2024-01-11 PROCEDURE — 25010000002 LABETALOL 5 MG/ML SOLUTION: Performed by: INTERNAL MEDICINE

## 2024-01-11 PROCEDURE — 96375 TX/PRO/DX INJ NEW DRUG ADDON: CPT

## 2024-01-11 PROCEDURE — 0202U NFCT DS 22 TRGT SARS-COV-2: CPT | Performed by: INTERNAL MEDICINE

## 2024-01-11 PROCEDURE — 36415 COLL VENOUS BLD VENIPUNCTURE: CPT

## 2024-01-11 PROCEDURE — 25010000002 HYDRALAZINE PER 20 MG: Performed by: INTERNAL MEDICINE

## 2024-01-11 PROCEDURE — 71045 X-RAY EXAM CHEST 1 VIEW: CPT

## 2024-01-11 PROCEDURE — 96374 THER/PROPH/DIAG INJ IV PUSH: CPT

## 2024-01-11 PROCEDURE — 25010000002 DIPHENHYDRAMINE PER 50 MG: Performed by: INTERNAL MEDICINE

## 2024-01-11 PROCEDURE — 93005 ELECTROCARDIOGRAM TRACING: CPT

## 2024-01-11 PROCEDURE — 85025 COMPLETE CBC W/AUTO DIFF WBC: CPT | Performed by: INTERNAL MEDICINE

## 2024-01-11 PROCEDURE — 80053 COMPREHEN METABOLIC PANEL: CPT | Performed by: INTERNAL MEDICINE

## 2024-01-11 PROCEDURE — 83690 ASSAY OF LIPASE: CPT | Performed by: INTERNAL MEDICINE

## 2024-01-11 RX ORDER — DIPHENHYDRAMINE HYDROCHLORIDE 50 MG/ML
25 INJECTION INTRAMUSCULAR; INTRAVENOUS ONCE
Status: COMPLETED | OUTPATIENT
Start: 2024-01-11 | End: 2024-01-11

## 2024-01-11 RX ORDER — CLONIDINE HYDROCHLORIDE 0.1 MG/1
0.1 TABLET ORAL ONCE
Status: COMPLETED | OUTPATIENT
Start: 2024-01-11 | End: 2024-01-11

## 2024-01-11 RX ORDER — SODIUM CHLORIDE 0.9 % (FLUSH) 0.9 %
10 SYRINGE (ML) INJECTION AS NEEDED
Status: DISCONTINUED | OUTPATIENT
Start: 2024-01-11 | End: 2024-01-12 | Stop reason: HOSPADM

## 2024-01-11 RX ORDER — HYDRALAZINE HYDROCHLORIDE 20 MG/ML
10 INJECTION INTRAMUSCULAR; INTRAVENOUS ONCE
Status: COMPLETED | OUTPATIENT
Start: 2024-01-11 | End: 2024-01-11

## 2024-01-11 RX ORDER — ASPIRIN 81 MG/1
324 TABLET, CHEWABLE ORAL ONCE
Status: COMPLETED | OUTPATIENT
Start: 2024-01-11 | End: 2024-01-11

## 2024-01-11 RX ORDER — LABETALOL HYDROCHLORIDE 5 MG/ML
10 INJECTION, SOLUTION INTRAVENOUS ONCE
Status: COMPLETED | OUTPATIENT
Start: 2024-01-11 | End: 2024-01-11

## 2024-01-11 RX ORDER — HYDROCODONE BITARTRATE AND ACETAMINOPHEN 5; 325 MG/1; MG/1
1 TABLET ORAL EVERY 6 HOURS PRN
Qty: 10 TABLET | Refills: 0 | Status: SHIPPED | OUTPATIENT
Start: 2024-01-11

## 2024-01-11 RX ORDER — PROMETHAZINE HYDROCHLORIDE 25 MG/1
25 TABLET ORAL EVERY 6 HOURS PRN
Qty: 12 TABLET | Refills: 0 | Status: SHIPPED | OUTPATIENT
Start: 2024-01-11

## 2024-01-11 RX ORDER — KETOROLAC TROMETHAMINE 30 MG/ML
30 INJECTION, SOLUTION INTRAMUSCULAR; INTRAVENOUS ONCE
Status: COMPLETED | OUTPATIENT
Start: 2024-01-11 | End: 2024-01-11

## 2024-01-11 RX ORDER — PROCHLORPERAZINE EDISYLATE 5 MG/ML
5 INJECTION INTRAMUSCULAR; INTRAVENOUS ONCE
Status: COMPLETED | OUTPATIENT
Start: 2024-01-11 | End: 2024-01-11

## 2024-01-11 RX ADMIN — PROCHLORPERAZINE EDISYLATE 5 MG: 5 INJECTION INTRAMUSCULAR; INTRAVENOUS at 20:25

## 2024-01-11 RX ADMIN — IOPAMIDOL 100 ML: 612 INJECTION, SOLUTION INTRAVENOUS at 21:53

## 2024-01-11 RX ADMIN — LABETALOL HYDROCHLORIDE 10 MG: 5 INJECTION, SOLUTION INTRAVENOUS at 22:11

## 2024-01-11 RX ADMIN — CLONIDINE HYDROCHLORIDE 0.1 MG: 0.1 TABLET ORAL at 22:57

## 2024-01-11 RX ADMIN — KETOROLAC TROMETHAMINE 30 MG: 30 INJECTION, SOLUTION INTRAMUSCULAR; INTRAVENOUS at 20:31

## 2024-01-11 RX ADMIN — ASPIRIN 324 MG: 81 TABLET, CHEWABLE ORAL at 20:24

## 2024-01-11 RX ADMIN — HYDRALAZINE HYDROCHLORIDE 10 MG: 20 INJECTION INTRAMUSCULAR; INTRAVENOUS at 22:57

## 2024-01-11 RX ADMIN — DIPHENHYDRAMINE HYDROCHLORIDE 25 MG: 50 INJECTION, SOLUTION INTRAMUSCULAR; INTRAVENOUS at 22:10

## 2024-01-11 NOTE — Clinical Note
[facility] EMERGENCY DEPARTMENT  [address] [place] [address] AVE  [facility] [address]  Phone: [phone]    Ana Small was seen and treated in our emergency department on 1/11/2024.  She may return to work on 01/15/2024.         Thank you for choosing [facility].    Estrella Kurtz DO

## 2024-01-12 VITALS
WEIGHT: 269 LBS | TEMPERATURE: 97.7 F | BODY MASS INDEX: 47.66 KG/M2 | SYSTOLIC BLOOD PRESSURE: 143 MMHG | HEIGHT: 63 IN | OXYGEN SATURATION: 95 % | HEART RATE: 91 BPM | RESPIRATION RATE: 16 BRPM | DIASTOLIC BLOOD PRESSURE: 106 MMHG

## 2024-01-12 LAB
QT INTERVAL: 396 MS
QT INTERVAL: 438 MS
QTC INTERVAL: 462 MS
QTC INTERVAL: 502 MS

## 2024-01-12 NOTE — ED PROVIDER NOTES
Subjective   History of Present Illness  58-year-old female presents emergency department with complaints of chest pain shortness of breath.  She was sick the last few days with a virus.  She was not tested for any virus, but notes that her PCP told her this.  She states that she woke up last night with chest pain.  She has had bad diarrhea.  Vomiting and nausea with a headache.  She admits to abdominal pain, epigastric.    Review of Systems   Constitutional:  Negative for chills and fever.   Respiratory:  Positive for cough and shortness of breath.    Cardiovascular:  Positive for chest pain. Negative for leg swelling.   Gastrointestinal:  Positive for abdominal pain, diarrhea, nausea and vomiting. Negative for blood in stool.       Past Medical History:   Diagnosis Date    Abnormal ECG 12/2022    Arrhythmia 2016    SVT.  Has cardiac ablation 2016 or 2017    Asthma 2020    Cancer 2017    Thyroid    COPD (chronic obstructive pulmonary disease) 2020    Diabetes mellitus     Disease of thyroid gland     PARATHYROID TUMOR REMOVED    Heart murmur 2012    PCP in [place] mentioned it's never been mentioned again.    Hiatal hernia     Hyperlipidemia     Hypertension     Migraines     MARINO on CPAP 3/14/2023    Pneumonia     PONV (postoperative nausea and vomiting)     Pulmonary embolus 10/14/2023    Seizures 2010, 2016    X 3 TOTAL    Stroke     APHASIA, SLIGHT WEAKNESS ON RIGHT SIDE (INTENSIFIED WITH FATIGUE)    SVT (supraventricular tachycardia)     Tachycardia        Allergies   Allergen Reactions    Latex Anaphylaxis     CALLED TO [facility] IN SURGERY SCHEDULING.    Sulfa Antibiotics Anaphylaxis       Past Surgical History:   Procedure Laterality Date    ABLATION OF DYSRHYTHMIC FOCUS  2016 or 17    BREAST BIOPSY Right 05/25/2017    Procedure: RIGHT BREAST EXCISIONAL BIOPSY;  Surgeon: Radha Moseley MD;  Location: [facility] OR;  Service:     CARDIAC ABLATION  05/18/2017    CARDIAC ELECTROPHYSIOLOGY PROCEDURE N/A 01/27/2023     Procedure: Pacemaker DC new  for irreversible sinus node dysfunction;  Surgeon: Harry Cerrato MD;  Location:  [facility] CATH INVASIVE LOCATION;  Service: Cardiology;  Laterality: N/A;    CARDIAC ELECTROPHYSIOLOGY PROCEDURE N/A 2023    Procedure: Pacemaker DC new;  Surgeon: Harry Cerrato MD;  Location:  [facility]ATH [facility]ATION;  Service: Cardiology;  Laterality: N/A;    CARDIAC ELECTROPHYSIOLOGY PROCEDURE N/A 10/9/2023    Procedure: EP/CRM Study, SVT (58310);  Surgeon: Emilia Elizondo MD;  Location:  [facility] CATH INVASIVE LOCATION;  Service: Cardiovascular;  Laterality: N/A;     SECTION      HEMANGIOMA EXCISION      from liver.     HERNIA REPAIR      INSERT / REPLACE / REMOVE PACEMAKER      LIVER RESECTION      Giant hemangioma    PARATHYROID GLAND SURGERY      PILONIDAL CYSTECTOMY      TONSILLECTOMY         Family History   Problem Relation Age of Onset    Breast cancer Maternal Aunt     Cancer Mother         LIVER    Diabetes Mother     Heart disease Mother     Anemia Mother         Pernicious anemia - took B-12 shots    Hypertension Mother         Uncontrolled    Arrhythmia Paternal Grandfather         Some type of congenital arrhythmia    Heart attack Paternal Grandfather         Caused by some congenital arrhythmia       Social History     Socioeconomic History    Marital status:     Number of children: 2   Tobacco Use    Smoking status: Former     Packs/day: 2.00     Years: 15.00     Additional pack years: 0.00     Total pack years: 30.00     Types: Cigarettes     Quit date: 2016     Years since quittin.3    Smokeless tobacco: Never    Tobacco comments:     Off/on since age 27. Quit for years at a time.   Vaping Use    Vaping Use: Never used   Substance and Sexual Activity    Alcohol use: No    Drug use: No    Sexual activity: Yes     Partners: Male     Birth control/protection: Surgical, Post-menopausal, Bilateral salpingectomy            Objective   Physical Exam  Vitals  reviewed.   Constitutional:       Appearance: She is obese. She is ill-appearing.   HENT:      Head: Normocephalic and atraumatic.      Nose: Nose normal.   Eyes:      Conjunctiva/sclera: Conjunctivae normal.   Cardiovascular:      Rate and Rhythm: Normal rate and regular rhythm.      Heart sounds: Normal heart sounds.   Pulmonary:      Effort: Pulmonary effort is normal.      Breath sounds: Normal breath sounds.   Abdominal:      General: Bowel sounds are normal.      Palpations: Abdomen is soft.      Tenderness: There is abdominal tenderness.      Comments: Epigastric abdominal pain   Musculoskeletal:         General: No tenderness.      Cervical back: Normal range of motion and neck supple.      Right lower leg: No edema.      Left lower leg: No edema.   Skin:     General: Skin is warm and dry.   Neurological:      Mental Status: She is alert and oriented to person, place, and time.      Cranial Nerves: No cranial nerve deficit.   Psychiatric:         Mood and Affect: Mood normal. Mood is anxious.         Procedures           ED Course  ED Course as of 01/11/24 2249   Th Jan 11, 2024   4491 I spoke with the patient.  We reviewed her labs.  CTs and x-rays.  Discussed her troponin.  The patient is agreeable with discharge home.  She would like a different type of medicine other than Zofran, as she has this at home.  We discussed Phenergan.  I discussed setting her home with pain medication for her abdomen.  She would like a note for work for 3 days.  Will send this with the patient.  I discussed with her that if her symptoms worsen or fail to improve she is to return to the emergency department, and she is agreeable with this plan.  I gave a copy of the patient's CT to her , discussed that she will need a nonemergent ultrasound as an outpatient to follow-up with the abnormalities that were seen on the scan. [AJ]      ED Course User Index  [AJ] Estrella Kurtz,       IV fluids and Compazine ordered                            Lab Results (last 24 hours)       Procedure Component Value Units Date/Time    CBC & Differential [[id number]]  (Abnormal) Collected: 01/11/24 1918    Specimen: Blood Updated: 01/11/24 1936    Narrative:      The following orders were created for panel order CBC & Differential.  Procedure                               Abnormality         Status                     ---------                               -----------         ------                     CBC Auto Differential[[id number]]        Abnormal            Final result                 Please view results for these tests on the individual orders.    Comprehensive Metabolic Panel [[id number]]  (Abnormal) Collected: 01/11/24 1918    Specimen: Blood Updated: 01/11/24 2006     Glucose 249 mg/dL      BUN 18 mg/dL      Creatinine 0.96 mg/dL      Sodium 138 mmol/L      Potassium 4.0 mmol/L      Comment: Slight hemolysis detected by analyzer. Result may be falsely elevated.        Chloride 100 mmol/L      CO2 24.0 mmol/L      Calcium 8.9 mg/dL      Total Protein 7.4 g/dL      Albumin 4.3 g/dL      ALT (SGPT) 39 U/L      AST (SGOT) 28 U/L      Comment: Slight hemolysis detected by analyzer. Result may be falsely elevated.        Alkaline Phosphatase 148 U/L      Total Bilirubin <0.2 mg/dL      Globulin 3.1 gm/dL      A/G Ratio 1.4 g/dL      BUN/Creatinine Ratio 18.8     Anion Gap 14.0 mmol/L      eGFR 67.9 mL/min/1.73     Narrative:      GFR Normal >60  Chronic Kidney Disease <60  Kidney Failure <15      High Sensitivity Troponin T [[id number]]  (Normal) Collected: 01/11/24 1918    Specimen: Blood Updated: 01/11/24 1953     HS Troponin T 12 ng/L     Narrative:      High Sensitive Troponin T Reference Range:  <14.0 ng/L- Negative Female for AMI  <22.0 ng/L- Negative Male for AMI  >=14 - Abnormal Female indicating possible myocardial injury.  >=22 - Abnormal Male indicating possible myocardial injury.   Clinicians would have to utilize clinical acumen, EKG,  Troponin, and serial changes to determine if it is an Acute Myocardial Infarction or myocardial injury due to an underlying chronic condition.         CBC Auto Differential [[id number]]  (Abnormal) Collected: 01/11/24 1918    Specimen: Blood Updated: 01/11/24 1936     WBC 8.47 10*3/mm3      RBC 5.06 10*6/mm3      Hemoglobin 14.7 g/dL      Hematocrit 46.3 %      MCV 91.5 fL      MCH 29.1 pg      MCHC 31.7 g/dL      RDW 14.0 %      RDW-SD 46.8 fl      MPV 9.2 fL      Platelets 448 10*3/mm3      Neutrophil % 73.9 %      Lymphocyte % 16.9 %      Monocyte % 5.7 %      Eosinophil % 2.2 %      Basophil % 0.6 %      Immature Grans % 0.7 %      Neutrophils, Absolute 6.26 10*3/mm3      Lymphocytes, Absolute 1.43 10*3/mm3      Monocytes, Absolute 0.48 10*3/mm3      Eosinophils, Absolute 0.19 10*3/mm3      Basophils, Absolute 0.05 10*3/mm3      Immature Grans, Absolute 0.06 10*3/mm3      nRBC 0.0 /100 WBC     Magnesium [[id number]]  (Abnormal) Collected: 01/11/24 1918    Specimen: Blood Updated: 01/11/24 2006     Magnesium 2.8 mg/dL     Lipase [[id number]]  (Normal) Collected: 01/11/24 1918    Specimen: Blood Updated: 01/11/24 1952     Lipase 45 U/L     Gastrointestinal Panel, PCR - Stool, Per Rectum [[id number]]  (Normal) Collected: 01/11/24 2021    Specimen: Stool from Per Rectum Updated: 01/11/24 2223     Campylobacter Not Detected     Plesiomonas shigelloides Not Detected     Salmonella Not Detected     Vibrio Not Detected     Vibrio cholerae Not Detected     Yersinia enterocolitica Not Detected     Enteroaggregative E. coli (EAEC) Not Detected     Enteropathogenic E. coli (EPEC) Not Detected     Enterotoxigenic E. coli (ETEC) lt/st Not Detected     Shiga-like toxin-producing E. coli (STEC) stx1/stx2 Not Detected     Shigella/Enteroinvasive E. coli (EIEC) Not Detected     Cryptosporidium Not Detected     Cyclospora cayetanensis Not Detected     Entamoeba histolytica Not Detected     Giardia lamblia Not Detected     Adenovirus  F40/41 Not Detected     Astrovirus Not Detected     Norovirus GI/GII Not Detected     Rotavirus A Not Detected     Sapovirus (I, II, IV or V) Not Detected    Narrative:      If Aeromonas, Staphylococcus aureus or Bacillus cereus are suspected, please order FAB200R: Stool Culture, Aeromonas, S aureus, B Cereus.    Respiratory Panel PCR w/COVID-19(SARS-CoV-2) [facility] In-House, NP Swab in UTM/VTM, 2 HR TAT - Swab, Nasopharynx [[id number]]  (Normal) Collected: 01/11/24 2030    Specimen: Swab from Nasopharynx Updated: 01/11/24 2156     ADENOVIRUS, PCR Not Detected     Coronavirus 229E Not Detected     Coronavirus HKU1 Not Detected     Coronavirus NL63 Not Detected     Coronavirus OC43 Not Detected     COVID19 Not Detected     Human Metapneumovirus Not Detected     Human Rhinovirus/Enterovirus Not Detected     Influenza A PCR Not Detected     Influenza B PCR Not Detected     Parainfluenza Virus 1 Not Detected     Parainfluenza Virus 2 Not Detected     Parainfluenza Virus 3 Not Detected     Parainfluenza Virus 4 Not Detected     RSV, PCR Not Detected     Bordetella pertussis pcr Not Detected     Bordetella parapertussis PCR Not Detected     Chlamydophila pneumoniae PCR Not Detected     Mycoplasma pneumo by PCR Not Detected    Narrative:      In the setting of a positive respiratory panel with a viral infection PLUS a negative procalcitonin without other underlying concern for bacterial infection, consider observing off antibiotics or discontinuation of antibiotics and continue supportive care. If the respiratory panel is positive for atypical bacterial infection (Bordetella pertussis, Chlamydophila pneumoniae, or Mycoplasma pneumoniae), consider antibiotic de-escalation to target atypical bacterial infection.    Lactic Acid, Plasma [[id number]]  (Normal) Collected: 01/11/24 2208    Specimen: Blood Updated: 01/11/24 2234     Lactate 1.9 mmol/L     High Sensitivity Troponin T 2Hr [[id number]]  (Normal)  Collected: 01/11/24 2208    Specimen: Blood Updated: 01/11/24 2234     HS Troponin T 9 ng/L      Troponin T Delta -3 ng/L     Narrative:      High Sensitive Troponin T Reference Range:  <14.0 ng/L- Negative Female for AMI  <22.0 ng/L- Negative Male for AMI  >=14 - Abnormal Female indicating possible myocardial injury.  >=22 - Abnormal Male indicating possible myocardial injury.   Clinicians would have to utilize clinical acumen, EKG, Troponin, and serial changes to determine if it is an Acute Myocardial Infarction or myocardial injury due to an underlying chronic condition.               CT Abdomen Pelvis With Contrast   Final Result   1. No acute intra-abdominal or pelvic abnormality.   2. Multiple surgical clips along the posterior liver, the patient has a   history of previous resection of a giant hemangioma. There is a small 12   mm possible hemangioma within the liver dome as described above.   3. Fatty mass in the right pelvis measuring 2.9 cm, this could be   related to the right ovary, possibly a dermoid, the mass could also be   associated with the wall of the sigmoid colon, and represent a lipoma.   Consider follow-up with nonemergent pelvic ultrasound. Uterine   enlargement with multiple fibroids, some are calcified. Normal appendix.   4. Fat-containing periumbilical ventral hernia measuring 2.9 cm, this is   elongated.   5. Small exophytic cysts of the right kidney with a benign appearance.   Mild bilateral renal atrophy.    1. No acute intra-abdominal pelvic abnormality.    1.       This report was signed and finalized on 1/11/2024 10:17 PM by Dr. Vladislav Silva MD.          XR Chest 1 View   Final Result   . Bibasilar infiltrates much of which is likely related to   extensive atelectasis. No pneumothorax or definite pleural effusion.   Mild cardiomegaly without evidence of CHF.               This report was signed and finalized on 1/11/2024 7:21 PM by Dr. Vladislav Silva MD.                         Medical Decision Making  Problems Addressed:  Chest pain, unspecified type: complicated acute illness or injury  Diarrhea, unspecified type: complicated acute illness or injury  Generalized abdominal pain: complicated acute illness or injury  Nausea: complicated acute illness or injury    Amount and/or Complexity of Data Reviewed  Labs: ordered.     Details: CBC CMP troponin lactic lipase  Radiology: ordered.     Details: Chest x-ray CT abdomen pelvis  ECG/medicine tests: ordered.    Risk  OTC drugs.  Prescription drug management.        Final diagnoses:   Generalized abdominal pain   Nausea   Diarrhea, unspecified type   Chest pain, unspecified type       ED Disposition  ED Disposition       ED Disposition   Discharge    Condition   Stable    Comment   --               Camilla Ellison MD  [address] [place] [address] [id number] [phone]    In 2 days           Medication List        New Prescriptions      HYDROcodone-acetaminophen 5-325 MG per tablet  Commonly known as: Norco  Take 1 tablet by mouth Every 6 (Six) Hours As Needed for Moderate Pain.     promethazine 25 MG tablet  Commonly known as: PHENERGAN  Take 1 tablet by mouth Every 6 (Six) Hours As Needed for Nausea or Vomiting.               Where to Get Your Medications        These medications were sent to [facility]/pharmacy #[id number] - [place], [place] - [address] [place] [address] - [phone] [facility] [phone] [address] [address]      Phone: [phone]   HYDROcodone-acetaminophen 5-325 MG per tablet  promethazine 25 MG tablet            Estrella Kurtz, DO  01/11/24 1927       Estrella Kurtz, DO  01/11/24 5888

## 2024-01-12 NOTE — DISCHARGE INSTRUCTIONS
Hydration.  Return to the emergency department if symptoms worsen or fail to improve.  If chest pain worsens, if you are unable to hold down fluids, if you have worsening abdominal pain.  Please return and we can discuss admission at that time.  Follow-up with your PCP next week if possible.  Phenergan and pain medication were called into your pharmacy this evening as we discussed.  Pressure was elevated in the emergency department this evening.  Please keep a record of this to discuss with your PCP at follow-up.

## 2024-01-26 ENCOUNTER — OFFICE VISIT (OUTPATIENT)
Dept: CARDIOLOGY | Facility: CLINIC | Age: 59
End: 2024-01-26
Payer: COMMERCIAL

## 2024-01-26 VITALS
OXYGEN SATURATION: 99 % | BODY MASS INDEX: 47.48 KG/M2 | DIASTOLIC BLOOD PRESSURE: 72 MMHG | RESPIRATION RATE: 18 BRPM | SYSTOLIC BLOOD PRESSURE: 110 MMHG | HEART RATE: 79 BPM | WEIGHT: 268 LBS | HEIGHT: 63 IN

## 2024-01-26 DIAGNOSIS — I49.5 SICK SINUS SYNDROME: Primary | ICD-10-CM

## 2024-01-26 DIAGNOSIS — I26.93 SINGLE SUBSEGMENTAL PULMONARY EMBOLISM WITHOUT ACUTE COR PULMONALE: ICD-10-CM

## 2024-01-26 RX ORDER — TRAMADOL HYDROCHLORIDE 50 MG/1
50 TABLET ORAL EVERY 6 HOURS PRN
COMMUNITY

## 2024-01-26 NOTE — PROGRESS NOTES
"Chief Complaint  Rapid Heart Rate (4 MO FU )    Subjective        History of Present Illness    EP Problems:  1.  Sinus node function  -1/30/2023: Dual-chamber permanent pacemaker implant, Saritha Cerrato  2.  Sustained SVT  -5/18/2017: SVT ablation, [facility]     Cardiology Problems:  1.  LVH     Medical Problems:  1.  Hypothyroidism  2.  Morbid obesity  9/2023: BMI 47  3.  Papillary adenocarcinoma of the thyroid  4.  Seizure disorder  5.  Obstructive sleep apnea      Ana Small is a 58 y.o. female with problem list as above who presents to the clinic for follow up of sinus node dysfunction, sustained SVT.  She underwent EP study with attempted ablation in October.  She had no evidence of inducible SVT at that time with and without high-dose isoproterenol.  She was subsequently discharged home but came back to the emergency department several days later with chest pain.  A CTA was performed which revealed a small amount of thrombus within her pulmonary arteries consistent with pulmonary embolism.  She was started on anticoagulation has been tolerating this well.    Objective   Vital Signs:  /72 (BP Location: Right arm, Patient Position: Sitting)   Pulse 79   Resp 18   Ht 160 cm (63\")   Wt 122 kg (268 lb)   SpO2 99%   BMI 47.47 kg/m²   Estimated body mass index is 47.47 kg/m² as calculated from the following:    Height as of this encounter: 160 cm (63\").    Weight as of this encounter: 122 kg (268 lb).      Physical Exam  Vitals reviewed.   Constitutional:       Appearance: She is obese.   Cardiovascular:      Rate and Rhythm: Normal rate and regular rhythm.      Pulses: Normal pulses.      Heart sounds: Normal heart sounds. No murmur heard.  Pulmonary:      Effort: No respiratory distress.      Breath sounds: Normal breath sounds.   Skin:     General: Skin is warm and dry.   Neurological:      General: No focal deficit present.      Mental Status: She is alert and oriented to person, place, " and time.   Psychiatric:         Mood and Affect: Mood normal.         Judgment: Judgment normal.        Result Review :  The following data was reviewed by: Emilia Elizondo MD on 01/26/2024:      ECG 12 Lead    Date/Time: 1/26/2024 9:20 AM  Performed by: Emilia Elizondo MD    Authorized by: Emilia Elizondo MD  Comparison: compared with previous ECG from 1/11/2024  Similar to previous ECG  Rhythm: sinus rhythm  Rate: normal  Conduction: conduction normal  ST Segments: ST segments normal  QRS axis: normal  Other findings: low voltage    Clinical impression: non-specific ECG              Assessment and Plan   Diagnoses and all orders for this visit:    1. Sick sinus syndrome (Primary)    2. Single subsegmental pulmonary embolism without acute cor pulmonale    Other orders  -     ECG 12 Lead        Ana Small is a 58 y.o. female with problem list as above who presents to the clinic for follow up of sick sinus syndrome, pulmonary embolism.  She has findings consistent with PE that was provoked by a recent EP study.  She has completed 3 months of anticoagulation.  Given these findings, I do think it would be reasonable to discontinue anticoagulation at this time.  We have discussed that there is always some risk with discontinuation of anticoagulation and recurrent thrombotic complications, however given the provoked nature, I think that this is well justified by the decrease bleeding risk.    From my standpoint, she is at reasonably low cardiovascular risk of complications from the surgery.  Her biggest risk factor is her weight and underlying lung disease and obstructive sleep apnea.  We had a long risk-benefit discussion about continue to work on calorie counting and weight loss.  We also discussed that there is always is some degree of risk of cardiovascular complications with anesthesia.    Plan:  -Stop apixaban as above  -Continue metoprolol  -Okay to proceed with knee surgery from EP standpoint  -Obtain  echocardiogram as previously scheduled         Follow Up   Return in about 6 months (around 7/26/2024).  Patient was given instructions and counseling regarding her condition or for health maintenance advice. Please see specific information pulled into the AVS if appropriate.     Part of this note may be an electronic transcription/translation of spoken language to printed text using the Dragon Dictation System.

## 2024-02-06 ENCOUNTER — TELEPHONE (OUTPATIENT)
Dept: CARDIOLOGY | Facility: CLINIC | Age: 59
End: 2024-02-06
Payer: COMMERCIAL

## 2024-02-06 NOTE — TELEPHONE ENCOUNTER
PT TO UNDERGO A LEFT TOTAL KNEE WITH DR KINNEY. THIS IS NOT YET SCHEDULED.     PLEASE ADVISE IF OKAY TO PROCEED  FORM TO BE SIGNED

## 2024-02-07 ENCOUNTER — DOCUMENTATION (OUTPATIENT)
Dept: CARDIOLOGY | Facility: CLINIC | Age: 59
End: 2024-02-07
Payer: COMMERCIAL

## 2024-02-08 ENCOUNTER — CLINICAL SUPPORT NO REQUIREMENTS (OUTPATIENT)
Dept: CARDIOLOGY | Facility: CLINIC | Age: 59
End: 2024-02-08
Payer: COMMERCIAL

## 2024-02-08 ENCOUNTER — OFFICE VISIT (OUTPATIENT)
Dept: CARDIOLOGY | Facility: CLINIC | Age: 59
End: 2024-02-08
Payer: COMMERCIAL

## 2024-02-08 ENCOUNTER — DOCUMENTATION (OUTPATIENT)
Dept: CARDIOLOGY | Facility: CLINIC | Age: 59
End: 2024-02-08
Payer: COMMERCIAL

## 2024-02-08 ENCOUNTER — TELEPHONE (OUTPATIENT)
Dept: CARDIOLOGY | Facility: CLINIC | Age: 59
End: 2024-02-08
Payer: COMMERCIAL

## 2024-02-08 VITALS
OXYGEN SATURATION: 99 % | HEART RATE: 89 BPM | HEIGHT: 63 IN | WEIGHT: 270 LBS | SYSTOLIC BLOOD PRESSURE: 128 MMHG | DIASTOLIC BLOOD PRESSURE: 82 MMHG | RESPIRATION RATE: 18 BRPM | BODY MASS INDEX: 47.84 KG/M2

## 2024-02-08 DIAGNOSIS — I49.5 SICK SINUS SYNDROME: Primary | ICD-10-CM

## 2024-02-08 DIAGNOSIS — I51.7 MILD CONCENTRIC LEFT VENTRICULAR HYPERTROPHY (LVH): ICD-10-CM

## 2024-02-08 DIAGNOSIS — Z95.0 PACEMAKER: Primary | ICD-10-CM

## 2024-02-08 DIAGNOSIS — Z95.0 PRESENCE OF CARDIAC PACEMAKER: ICD-10-CM

## 2024-02-08 DIAGNOSIS — I26.93 SINGLE SUBSEGMENTAL PULMONARY EMBOLISM WITHOUT ACUTE COR PULMONALE: ICD-10-CM

## 2024-02-08 DIAGNOSIS — I49.5 SICK SINUS SYNDROME: ICD-10-CM

## 2024-02-08 DIAGNOSIS — G47.33 OSA ON CPAP: Chronic | ICD-10-CM

## 2024-02-08 DIAGNOSIS — I47.10 PAROXYSMAL SVT (SUPRAVENTRICULAR TACHYCARDIA): ICD-10-CM

## 2024-02-08 PROCEDURE — 99214 OFFICE O/P EST MOD 30 MIN: CPT | Performed by: NURSE PRACTITIONER

## 2024-02-08 NOTE — TELEPHONE ENCOUNTER
CALL PLACED TO DR DOBBINS OFFICE ( LEFT) TO SEE WHAT WAS NEEDED FROM US REGARDING PACEMAKER INSTRUCTIONS. I EXPLAINED THAT THERE IS TYPICALLY A FORM TO BE COMPLETED.  PHONE & FAX INFO LEFT.

## 2024-02-08 NOTE — PROGRESS NOTES
Dual Chamber Pacemaker Interrogation Report  IN OFFICE    February 8, 2024    Primary Cardiologist: Caro  : Medtronic Model: Iram XT DR MRI [id number]  Implant date: 1.30.2023    Reason for evaluation: Routine - Patient needing knee replacement surgery soon.  Indication for pacemaker: Sick Sinus Syndrome    Interrogation performed by:  Vianey Garg RN    Measurements  Atrial sensing - P wave: 4.5 mV  Atrial threshold: 0.5V@ 0.4ms  Atrial lead impedance: 418 ohms  Ventricular sensing - R wave: 4 mV  Ventricular threshold: 1 V @ 0.4 ms  Ventricular lead impedance:   608 ohms     Manual sensing and threshold testing performed:  Yes    Diagnostic Data  Atrial paced: 0.5 %  Ventricular paced: 0.3 %    Episodes/Alerts since 10.14.23:  SVT noted, longest duration 9 minutes 21 seconds, rates 154-167 bpm.    Battery status: Satisfactory , estimated 14.3 years remaining      Final Parameters  Mode:  AAI <=> DDD  Lower rate: 60 bpm   Upper rate: 130 bpm  AV Delay: paced- 180 ms  Sensed-150 ms  Atrial - Amplitude: 1.5 V   Pulse width: 0.4 ms   Sensitivity: 0.3 mV     Ventricular - Amplitude: 2 V  Pulse width: 0.4 ms  Sensitivity: 0.9 mV      Changes made: Dr. Elizondo's information added to device.    Conclusions: Normal Pacemaker Function, Stable Pacing and Sensing Thresholds, and Adequate Battery Reserve    Remote Monitor:  Yes, connected.    Follow up: Every 3 months via carelink, annually in office.     Final impression:  Data above reviewed.  Agree with findings and conclusions as per the above note.

## 2024-02-08 NOTE — TELEPHONE ENCOUNTER
CALL PLACED TO PT NOTIFYING HER THAT A F/U IN OFFICE WAS NEEDED FOR HER TO OBTAIN CLEARANCE/PACEMAKER INSTRUCTIONS FROM [facility]/DR VALLES.    PT SCHEDULED FOR TODAY @ 10:30 - SPOKE WITH DEVICE CLINIC - THEY ARE ABLE TO WORK PT IN AS WELL.

## 2024-02-08 NOTE — PROGRESS NOTES
Subjective:     Encounter Date:02/08/2024      Patient ID: Ana Small is a 58 y.o. female     Chief Complaint:  History of Present Illness  Patient presents today for follow up and needing for risk stratification for knee replacement. Of note patient was seen by Dr. Elizondo 1/26/24 and advised she could stop Eliquis and was okay for surgery. At last OV, in October, patient had been diagnosed with a pulmonary embolism and was experiencing chest pain and shortness of breath. She had a normal BNP and normal Troponins at time of diagnosis.  She had an attempted ablation in October with no inducible arrhythmia. She was started on Flecainide but this was discontinued shortly after due to reported bradycardia and side effects. Patient has sick sinus syndrome and had a pacemaker placed in January 2023 by Dr. Cerrato.  Patient has sick sinus syndrome with pacemaker. She had an echo in February 2023 which showed LVEF 61-65% and mild LVH. Patient was last seen in October for chest pain and shortness of breath- this was within the week of her diagnosis of Pulmonary Embolism. She notes that her chest pain has resolved. She has chronic shortness of breath but this is now back to her baseline. At that time she had an echo ordered and was scheduled for a 1 month follow up to reassess. However she no showed for her echo and cancelled her follow up. She is adamant she needs surgery ASAP. She is having to use crutches to get around. Patient has obesity, lung disease and sleep apnea.     The following portions of the patient's history were reviewed and updated as appropriate: allergies, current medications, past medical history, past social history, past and problem list.    Allergies   Allergen Reactions    Latex Anaphylaxis     CALLED TO [facility] IN SURGERY SCHEDULING.    Sulfa Antibiotics Anaphylaxis       Current Outpatient Medications:     albuterol sulfate  (90 Base) MCG/ACT inhaler, 2 puffs 2 (Two) Times a Day., Disp: , Rfl:      atorvastatin (LIPITOR) 10 MG tablet, Take 1 tablet by mouth Every Night., Disp: , Rfl:     busPIRone (BUSPAR) 5 MG tablet, Take 3 tablets by mouth 3 (Three) Times a Day., Disp: , Rfl:     escitalopram (LEXAPRO) 5 MG tablet, Take 4 tablets by mouth Daily., Disp: , Rfl:     Flovent  MCG/ACT inhaler, INHALE 2 PUFFS INTO THE LUNGS TWICE A DAY FOR 90 DAYS, Disp: , Rfl:     Humira Pen 40 MG/0.4ML Pen-injector Kit, , Disp: , Rfl:     losartan (COZAAR) 25 MG tablet, Take 1 tablet by mouth Daily., Disp: , Rfl:     metoclopramide (REGLAN) 5 MG tablet, Take 1 tablet by mouth 4 (Four) Times a Day Before Meals & at Bedtime., Disp: , Rfl:     metoprolol tartrate (LOPRESSOR) 50 MG tablet, Take 1 tablet by mouth 2 (Two) Times a Day., Disp: 180 tablet, Rfl: 3    montelukast (SINGULAIR) 10 MG tablet, Take 1 tablet by mouth Every Night., Disp: , Rfl:     tiotropium bromide monohydrate (Spiriva Respimat) 2.5 MCG/ACT aerosol solution inhaler, Inhale 2 puffs Daily., Disp: , Rfl:     traMADol (ULTRAM) 50 MG tablet, Take 1 tablet by mouth Every 6 (Six) Hours As Needed for Moderate Pain., Disp: , Rfl:     levothyroxine (SYNTHROID, LEVOTHROID) 125 MCG tablet, Take 1 tablet by mouth Every Morning for 30 days., Disp: 30 tablet, Rfl: 0  No current facility-administered medications for this visit.    Facility-Administered Medications Ordered in Other Visits:     ondansetron (ZOFRAN) injection 2 mg, 2 mg, Intravenous, Once, Harry Cerrato MD    Social History     Socioeconomic History    Marital status:     Number of children: 2   Tobacco Use    Smoking status: Former     Packs/day: 2.00     Years: 15.00     Additional pack years: 0.00     Total pack years: 30.00     Types: Cigarettes     Quit date: 2016     Years since quittin.4    Smokeless tobacco: Never    Tobacco comments:     Off/on since age 27. Quit for years at a time.   Vaping Use    Vaping Use: Never used   Substance and Sexual Activity    Alcohol use: Not  Currently     Comment: Once or twice a year    Drug use: Never    Sexual activity: Yes     Partners: Male     Birth control/protection: Tubal ligation       Review of Systems   Constitutional: Positive for malaise/fatigue. Negative for chills, decreased appetite, fever, weight gain and weight loss.   HENT:  Negative for nosebleeds.    Eyes:  Negative for visual disturbance.   Cardiovascular:  Positive for dyspnea on exertion and palpitations (rare). Negative for chest pain, leg swelling, near-syncope, orthopnea, paroxysmal nocturnal dyspnea and syncope.   Respiratory:  Positive for shortness of breath. Negative for cough, hemoptysis and snoring.    Endocrine: Negative for cold intolerance and heat intolerance.   Hematologic/Lymphatic: Negative for bleeding problem. Does not bruise/bleed easily.   Skin:  Negative for rash.   Musculoskeletal:  Positive for joint pain. Negative for back pain and falls.   Gastrointestinal:  Negative for abdominal pain, constipation, diarrhea, heartburn, melena, nausea and vomiting.   Genitourinary:  Negative for hematuria.   Neurological:  Positive for dizziness. Negative for headaches and light-headedness.   Psychiatric/Behavioral:  Negative for altered mental status.    Allergic/Immunologic: Negative for persistent infections.              Objective:     Constitutional:       Appearance: Healthy appearance. Not in distress.   Eyes:      Pupils: Pupils are equal, round, and reactive to light.   HENT:      Head: Normocephalic and atraumatic.      Nose: Nose normal.    Mouth/Throat:      Dentition: Normal.   Pulmonary:      Effort: Pulmonary effort is normal.      Breath sounds: Normal breath sounds.   Chest:      Chest wall: Not tender to palpatation.   Cardiovascular:      PMI at left midclavicular line. Normal rate. Regular rhythm.      Murmurs: There is no murmur.   Pulses:     Intact distal pulses.   Edema:     Peripheral edema absent.   Abdominal:      General: Bowel sounds are  "normal.      Palpations: Abdomen is soft.   Musculoskeletal: Normal range of motion.      Cervical back: Normal range of motion. Skin:     General: Skin is warm and dry.   Neurological:      Mental Status: Alert, oriented to person, place, and time and oriented to person, place and time.   Psychiatric:         Attention and Perception: Attention normal.         Mood and Affect: Mood normal.           Procedures  /82 (BP Location: Right arm, Patient Position: Sitting, Cuff Size: Large Adult)   Pulse 89   Resp 18   Ht 160 cm (63\")   Wt 122 kg (270 lb)   SpO2 99%   BMI 47.83 kg/m²   Lab Review:   I have reviewed       No results found for: \"CHOL\", \"CHLPL\", \"TRIG\", \"HDL\", \"LDL\", \"LDLDIRECT\"   Results for orders placed during the hospital encounter of 01/30/23    Adult Transthoracic Echo Complete W/ Cont if Necessary Per Protocol    Interpretation Summary    Left ventricular systolic function is normal. Left ventricular ejection fraction appears to be 61 - 65%.    Left ventricular wall thickness is consistent with mild concentric hypertrophy.    Left ventricular diastolic function was normal.    Estimated right ventricular systolic pressure from tricuspid regurgitation is normal (<35 mmHg).     Assessment:          Diagnosis Plan   1. Sick sinus syndrome        2. Presence of cardiac pacemaker        3. Single subsegmental pulmonary embolism without acute cor pulmonale        4. Mild concentric left ventricular hypertrophy (LVH)        5. Paroxysmal SVT (supraventricular tachycardia)        6. MARINO on CPAP        7. BMI 45.0-49.9, adult                 Plan:       Sick Sinus syndrome - now with pacemaker. Paced less than 1% in Artrial and Ventricle  Pacemaker - placed in January 2023.   Pulmonary Emboli- Eliquis stopped by Dr. Elizondo at her appointment in January.    Mild LVH - without heart failure. Low salt Diet. Stable.   Paroxysmal SVT - follows with Dr. Elizondo. Attempted ablation in October.   Sleep apnea-  " "compliant  7. Body mass index is 47.83 kg/m².     Risk assessment- from a revised cardiac risk index standpoint, patient is low risk for a major cardiac event with this surgery. This is primarily because she has no known history of coronary/ischemic heart disease, congestive heart failure, cerebrovascular disease, insulin-dependent diabetes mellitus, or creatinine greater than 2. Though the risk is low (0.9%), it is not zero. She does have obesity, sleep apnea and underlying lung disease. From a cardiac standpoint risk is felt non-modifiable because she has no signs or symptoms of the following \"active cardiac conditions\"- acute coronary syndrome, acutely decompensated congestive heart failure, uncontrolled arrhythmias, or significant valvular disease. Therefore, recommend proceeding with the planned surgery without further delay. She does have SVT and is followed by our EP team who also okayed for surgery.      Follow up in 4 months       "

## 2024-02-28 RX ORDER — METOPROLOL TARTRATE 50 MG/1
50 TABLET, FILM COATED ORAL 2 TIMES DAILY
Qty: 180 TABLET | Refills: 3 | Status: SHIPPED | OUTPATIENT
Start: 2024-02-28

## 2024-08-06 ENCOUNTER — TELEPHONE (OUTPATIENT)
Dept: GASTROENTEROLOGY | Age: 59
End: 2024-08-06

## 2024-08-06 NOTE — TELEPHONE ENCOUNTER
Pearl requests that procedure   return their call for open access for CLN . The best time to reach her is Anytime.     Thank you.

## 2024-08-20 RX ORDER — BUSPIRONE HYDROCHLORIDE 15 MG/1
15 TABLET ORAL 3 TIMES DAILY
COMMUNITY

## 2024-08-20 RX ORDER — METOPROLOL TARTRATE 50 MG
75 TABLET ORAL 2 TIMES DAILY
COMMUNITY

## 2024-08-20 RX ORDER — LEVOTHYROXINE SODIUM 125 UG/1
125 TABLET ORAL
COMMUNITY

## 2024-08-20 RX ORDER — TRAMADOL HYDROCHLORIDE 50 MG/1
50 TABLET ORAL EVERY 6 HOURS PRN
COMMUNITY

## 2024-08-20 RX ORDER — ESCITALOPRAM OXALATE 20 MG/1
20 TABLET ORAL DAILY
COMMUNITY

## 2024-08-20 RX ORDER — FLUTICASONE PROPIONATE 220 UG/1
1 AEROSOL, METERED RESPIRATORY (INHALATION) 2 TIMES DAILY
COMMUNITY

## 2024-08-20 RX ORDER — ONDANSETRON 4 MG/1
4 TABLET, ORALLY DISINTEGRATING ORAL EVERY 8 HOURS PRN
COMMUNITY

## 2024-08-20 RX ORDER — ASPIRIN 325 MG
325 TABLET, DELAYED RELEASE (ENTERIC COATED) ORAL DAILY
COMMUNITY

## 2024-08-20 RX ORDER — PROMETHAZINE HYDROCHLORIDE 25 MG/1
25 TABLET ORAL EVERY 6 HOURS PRN
COMMUNITY

## 2024-08-20 RX ORDER — LOSARTAN POTASSIUM 50 MG/1
50 TABLET ORAL DAILY
COMMUNITY

## 2024-08-20 RX ORDER — MONTELUKAST SODIUM 10 MG/1
10 TABLET ORAL NIGHTLY
COMMUNITY

## 2024-08-20 RX ORDER — POTASSIUM CHLORIDE 1500 MG/1
20 TABLET, EXTENDED RELEASE ORAL DAILY
COMMUNITY

## 2024-08-20 RX ORDER — ATORVASTATIN CALCIUM 10 MG/1
10 TABLET, FILM COATED ORAL
COMMUNITY

## 2024-09-20 ENCOUNTER — OFFICE VISIT (OUTPATIENT)
Dept: GASTROENTEROLOGY | Age: 59
End: 2024-09-20
Payer: COMMERCIAL

## 2024-09-20 VITALS
DIASTOLIC BLOOD PRESSURE: 84 MMHG | SYSTOLIC BLOOD PRESSURE: 136 MMHG | WEIGHT: 265 LBS | HEIGHT: 63 IN | OXYGEN SATURATION: 96 % | BODY MASS INDEX: 46.95 KG/M2 | HEART RATE: 100 BPM

## 2024-09-20 DIAGNOSIS — K21.9 GASTROESOPHAGEAL REFLUX DISEASE, UNSPECIFIED WHETHER ESOPHAGITIS PRESENT: Primary | ICD-10-CM

## 2024-09-20 DIAGNOSIS — R10.13 EPIGASTRIC PAIN: ICD-10-CM

## 2024-09-20 DIAGNOSIS — R19.4 CHANGE IN BOWEL HABITS: ICD-10-CM

## 2024-09-20 DIAGNOSIS — R10.84 GENERALIZED ABDOMINAL PAIN: ICD-10-CM

## 2024-09-20 PROCEDURE — 99204 OFFICE O/P NEW MOD 45 MIN: CPT | Performed by: NURSE PRACTITIONER

## 2024-09-20 RX ORDER — OMEPRAZOLE 40 MG/1
40 CAPSULE, DELAYED RELEASE ORAL
Qty: 90 CAPSULE | Refills: 3 | Status: SHIPPED | OUTPATIENT
Start: 2024-09-20

## 2024-09-20 RX ORDER — SEMAGLUTIDE 0.68 MG/ML
INJECTION, SOLUTION SUBCUTANEOUS
COMMUNITY
Start: 2024-08-22

## 2024-09-20 RX ORDER — ADALIMUMAB 40MG/0.4ML
KIT SUBCUTANEOUS
COMMUNITY
Start: 2023-09-27

## 2024-09-27 ASSESSMENT — ENCOUNTER SYMPTOMS
NAUSEA: 0
RECTAL PAIN: 0
ABDOMINAL PAIN: 1
ANAL BLEEDING: 0
TROUBLE SWALLOWING: 0
CHOKING: 0
SHORTNESS OF BREATH: 0
CONSTIPATION: 1
BLOOD IN STOOL: 0
VOMITING: 0
DIARRHEA: 0
COUGH: 0
ABDOMINAL DISTENTION: 0

## 2024-10-07 ENCOUNTER — ANCILLARY PROCEDURE (OUTPATIENT)
Dept: ENDOSCOPY | Age: 59
End: 2024-10-07
Attending: INTERNAL MEDICINE
Payer: COMMERCIAL

## 2024-10-07 ENCOUNTER — HOSPITAL ENCOUNTER (OUTPATIENT)
Age: 59
Setting detail: OUTPATIENT SURGERY
Discharge: HOME | End: 2024-10-07
Attending: INTERNAL MEDICINE | Admitting: INTERNAL MEDICINE
Payer: COMMERCIAL

## 2024-10-07 ENCOUNTER — ANESTHESIA (OUTPATIENT)
Dept: ENDOSCOPY | Age: 59
End: 2024-10-07
Payer: COMMERCIAL

## 2024-10-07 ENCOUNTER — ANESTHESIA EVENT (OUTPATIENT)
Dept: ENDOSCOPY | Age: 59
End: 2024-10-07
Payer: COMMERCIAL

## 2024-10-07 VITALS
HEART RATE: 77 BPM | TEMPERATURE: 98.1 F | SYSTOLIC BLOOD PRESSURE: 171 MMHG | WEIGHT: 262 LBS | HEIGHT: 63 IN | BODY MASS INDEX: 46.42 KG/M2 | OXYGEN SATURATION: 96 % | DIASTOLIC BLOOD PRESSURE: 98 MMHG | RESPIRATION RATE: 18 BRPM

## 2024-10-07 DIAGNOSIS — R19.4 CHANGE IN BOWEL HABITS: ICD-10-CM

## 2024-10-07 DIAGNOSIS — R10.13 EPIGASTRIC PAIN: ICD-10-CM

## 2024-10-07 DIAGNOSIS — K21.9 GASTROESOPHAGEAL REFLUX DISEASE, UNSPECIFIED WHETHER ESOPHAGITIS PRESENT: ICD-10-CM

## 2024-10-07 DIAGNOSIS — R10.9 ABDOMINAL PAIN, UNSPECIFIED ABDOMINAL LOCATION: ICD-10-CM

## 2024-10-07 LAB
GLUCOSE BLD-MCNC: 133 MG/DL (ref 70–99)
PERFORMED ON: ABNORMAL

## 2024-10-07 PROCEDURE — 3700000001 HC ADD 15 MINUTES (ANESTHESIA): Performed by: INTERNAL MEDICINE

## 2024-10-07 PROCEDURE — 45378 DIAGNOSTIC COLONOSCOPY: CPT | Performed by: INTERNAL MEDICINE

## 2024-10-07 PROCEDURE — 7100000011 HC PHASE II RECOVERY - ADDTL 15 MIN: Performed by: INTERNAL MEDICINE

## 2024-10-07 PROCEDURE — 6360000002 HC RX W HCPCS: Performed by: NURSE ANESTHETIST, CERTIFIED REGISTERED

## 2024-10-07 PROCEDURE — 3609012400 HC EGD TRANSORAL BIOPSY SINGLE/MULTIPLE: Performed by: INTERNAL MEDICINE

## 2024-10-07 PROCEDURE — 7100000010 HC PHASE II RECOVERY - FIRST 15 MIN: Performed by: INTERNAL MEDICINE

## 2024-10-07 PROCEDURE — 43239 EGD BIOPSY SINGLE/MULTIPLE: CPT | Performed by: INTERNAL MEDICINE

## 2024-10-07 PROCEDURE — 2709999900 HC NON-CHARGEABLE SUPPLY: Performed by: INTERNAL MEDICINE

## 2024-10-07 PROCEDURE — 2500000003 HC RX 250 WO HCPCS: Performed by: NURSE ANESTHETIST, CERTIFIED REGISTERED

## 2024-10-07 PROCEDURE — 3609027000 HC COLONOSCOPY: Performed by: INTERNAL MEDICINE

## 2024-10-07 PROCEDURE — 82962 GLUCOSE BLOOD TEST: CPT

## 2024-10-07 PROCEDURE — 88342 IMHCHEM/IMCYTCHM 1ST ANTB: CPT

## 2024-10-07 PROCEDURE — 2580000003 HC RX 258: Performed by: INTERNAL MEDICINE

## 2024-10-07 PROCEDURE — 3700000000 HC ANESTHESIA ATTENDED CARE: Performed by: INTERNAL MEDICINE

## 2024-10-07 PROCEDURE — 88305 TISSUE EXAM BY PATHOLOGIST: CPT

## 2024-10-07 RX ORDER — LIDOCAINE HYDROCHLORIDE 10 MG/ML
INJECTION, SOLUTION EPIDURAL; INFILTRATION; INTRACAUDAL; PERINEURAL
Status: DISCONTINUED | OUTPATIENT
Start: 2024-10-07 | End: 2024-10-07 | Stop reason: SDUPTHER

## 2024-10-07 RX ORDER — FENTANYL CITRATE 50 UG/ML
INJECTION, SOLUTION INTRAMUSCULAR; INTRAVENOUS
Status: DISCONTINUED | OUTPATIENT
Start: 2024-10-07 | End: 2024-10-07 | Stop reason: SDUPTHER

## 2024-10-07 RX ORDER — PROPOFOL 10 MG/ML
INJECTION, EMULSION INTRAVENOUS
Status: DISCONTINUED | OUTPATIENT
Start: 2024-10-07 | End: 2024-10-07 | Stop reason: SDUPTHER

## 2024-10-07 RX ORDER — SODIUM CHLORIDE, SODIUM LACTATE, POTASSIUM CHLORIDE, CALCIUM CHLORIDE 600; 310; 30; 20 MG/100ML; MG/100ML; MG/100ML; MG/100ML
INJECTION, SOLUTION INTRAVENOUS CONTINUOUS
Status: DISCONTINUED | OUTPATIENT
Start: 2024-10-07 | End: 2024-10-07 | Stop reason: HOSPADM

## 2024-10-07 RX ADMIN — PROPOFOL 380 MG: 10 INJECTION, EMULSION INTRAVENOUS at 13:49

## 2024-10-07 RX ADMIN — FENTANYL CITRATE 50 MCG: 50 INJECTION, SOLUTION INTRAMUSCULAR; INTRAVENOUS at 13:48

## 2024-10-07 RX ADMIN — SODIUM CHLORIDE, POTASSIUM CHLORIDE, SODIUM LACTATE AND CALCIUM CHLORIDE: 600; 310; 30; 20 INJECTION, SOLUTION INTRAVENOUS at 12:08

## 2024-10-07 RX ADMIN — LIDOCAINE HYDROCHLORIDE 100 MG: 10 INJECTION, SOLUTION EPIDURAL; INFILTRATION; INTRACAUDAL; PERINEURAL at 13:49

## 2024-10-07 ASSESSMENT — PAIN - FUNCTIONAL ASSESSMENT
PAIN_FUNCTIONAL_ASSESSMENT: NONE - DENIES PAIN
PAIN_FUNCTIONAL_ASSESSMENT: 0-10
PAIN_FUNCTIONAL_ASSESSMENT: NONE - DENIES PAIN

## 2024-10-07 ASSESSMENT — LIFESTYLE VARIABLES: SMOKING_STATUS: 1

## 2024-10-07 NOTE — OP NOTE
Patient: Pearl Ansari : 1965  Med Rec#: [id number] Acc#: [id number]   Primary Care Provider Monica Willoughby MD    Date of Procedure:  10/7/2024    Endoscopist: Fortunato Heredia MD, MD    Referring Provider: Monica Willoughby MD,     Operation Performed: Colonoscopy up to the cecum    Indications: For both EGD and colonoscopy exams today:    1. Gastroesophageal reflux disease, unspecified whether esophagitis present  2. Epigastric pain  3. Change in bowel habits  4. Generalized abdominal pain  5. Hx of Nissen fundoplication in 2018   6.  Melena/black stools couple of months ago      Anesthesia:  Sedation was administered by anesthesia who monitored the patient during the procedure.    I met with Pearl Ansari prior to procedure. We discussed the procedure itself, and I have discussed the risks of endoscopy (including-- but not limited to-- pain, discomfort, bleeding potentially requiring second endoscopic procedure and/or blood transfusion, organ perforation requiring operative repair, damage to organs near the colon, infection, aspiration, cardiopulmonary/allergic reaction), benefits, indications to endoscopy. Additionally, we discussed options other than colonoscopy. The patient expressed understanding. All questions answered. The patient decided to proceed with the procedure.  Signed informed consent was placed on the chart.    Blood Loss: minimal    Withdrawal time: More than 9 minutes  Bowel Prep: Fair  with small amounts of thick semisolid stool and a small amount of thick, opaque liquid scattered in patchy segments throughout the colon obscuring the underlying mucosa.Lesions including polyps may have been missed.     Complications: no immediate complications    DESCRIPTION OF PROCEDURE:     A time out was performed. After written informed consent was obtained, the patient was placed in the left lateral position.     The perianal area was inspected, and a digital rectal exam was performed. A 
infection.    NO ulcers or masses or gastric outlet obstruction or retained food or fluid. Rugae were normal and lumen distended well with insufflation. Retroflexed views otherwise revealed a normal GE junction, fundus and cardia as well.       Duodenum: Normal. Random biopsies were taken to check for Celiac disease and other causes of villous atrophy.      RECOMMENDATIONS:    1.  Await path results, the patient will be contacted in 7-10 days with biopsy results.   2.  Continue anti-GERD measures along with medications for GERD  3.  Small frequent meals with a low-carb low-fat low calorie but protein and fiber rich antidiabetic diet    The results were discussed with the patient and family.  A copy of the images obtained were given to the patient.     (Please note that portions of this note were completed with a voice recognition program. Efforts were made to edit the dictations but occasionally words may be mis-transcribed.)     Fortunato Heredia MD, MD  10/7/2024  1:46 PM

## 2024-10-07 NOTE — ANESTHESIA PRE PROCEDURE
Department of Anesthesiology  Preprocedure Note       Name:  Pearl Ansari   Age:  59 y.o.  :  1965                                          MRN:  [id number]         Date:  10/7/2024      Surgeon: Surgeon(s):  Fortunato Heredia MD    Procedure: Procedure(s):  ESOPHAGOGASTRODUODENOSCOPY BIOPSY  COLONOSCOPY DIAGNOSTIC    Medications prior to admission:   Prior to Admission medications    Medication Sig Start Date End Date Taking? Authorizing Provider   OZEMPIC, 0.25 OR 0.5 MG/DOSE, 2 MG/3ML SOPN INJECT 0.5 MG SUBCUTANEOUSLY ONE TIME PER WEEK 28 DAYS 24  Yes ProviderSiddhartha MD   metoprolol tartrate (LOPRESSOR) 50 MG tablet Take 1.5 tablets by mouth 2 times daily   Yes ProviderSiddhartha MD   losartan (COZAAR) 50 MG tablet Take 1 tablet by mouth daily   Yes ProviderSiddhartha MD   escitalopram (LEXAPRO) 20 MG tablet Take 1 tablet by mouth daily   Yes ProviderSiddhartha MD   busPIRone (BUSPAR) 15 MG tablet Take 15 mg by mouth 3 times daily   Yes ProviderSiddhartha MD   levothyroxine (SYNTHROID) 125 MCG tablet Take 1 tablet by mouth every morning (before breakfast) Take on an empty stomach   Yes ProviderSiddhartha MD   metoclopramide (REGLAN) 5 MG tablet Take 1 tablet by mouth 4 times daily 19  Yes ProviderSiddhartha MD   adalimumab (HUMIRA, 2 PEN,) 40 MG/0.4ML PNKT  23   Siddhartha Matias MD   omeprazole (PRILOSEC) 40 MG delayed release capsule Take 1 capsule by mouth every morning (before breakfast)  Patient not taking: Reported on 10/7/2024 9/20/24   Jose Collier APRN   metroNIDAZOLE (NORITATE) 1 % cream Apply topically daily Apply topically daily.    ProviderSiddhartha MD   tiZANidine (ZANAFLEX) 4 MG tablet Take 1 tablet by mouth every 6 hours as needed    ProviderSiddhartha MD       Current medications:    Current Facility-Administered Medications   Medication Dose Route Frequency Provider Last Rate Last Admin   • lactated ringers IV soln infusion

## 2024-10-07 NOTE — DISCHARGE INSTRUCTIONS
1.  Await path results, the patient will be contacted in 7-10 days with biopsy results.   2.  Continue anti-GERD measures along with medications for GERD  3.  Small frequent meals with a low-carb low-fat low calorie but protein and fiber rich antidiabetic diet  4. Repeat colonoscopy: For colon cancer screening-in 5 years; sooner if her personal or family history as pertaining to colorectal cancer risk changes requiring an earlier exam or if the patient were to develop lower GI symptoms such as bleeding, abdominal pain, change in bowel habits or stool caliber or if the patient has anemia or unexplained weight loss in the future.     - GI clinic f/u 6-8 weeks with Ms. Collier    - Keep scheduled f/u appts with other MDs     - NO ASA/NSAIDs x 2 weeks      Upper GI Endoscopy and Colonoscopy: What to Expect at Home  Your Recovery    After an upper GI endoscopy, you may have a sore throat for a day or two after the test.    After a colonoscopy you may be bloated or have gas pains. You may need to pass gas. If a biopsy was done or a polyp was removed, you may have streaks of blood in your stool (feces) for a few days. Problems such as heavy rectal bleeding may not occur until several weeks after the test. This isn't common. But it can happen after polyps are removed.    How can you care for yourself at home?  Activity   Rest as much as you need to after you go home.  You should be able to go back to your usual activities the day after the test.  You should not drive or operate equipment for the remainder of today.    Diet   Follow your doctor's directions for eating after the test.  Unless your doctor has told you not to, drink plenty of fluids. This helps to replace the fluids that were lost during the colon prep.  Do not drink alcohol for 24 hours    Medications   If you have a sore throat the day after the test, use an over-the-counter spray to numb your throat.  If polyps were removed or biopsies removed, your

## 2024-10-07 NOTE — ANESTHESIA POSTPROCEDURE EVALUATION
Department of Anesthesiology  Postprocedure Note    Patient: Pearl Ansari  MRN: [id number]  YOB: 1965  Date of evaluation: 10/7/2024    Procedure Summary       Date: 10/07/24 Room / Location: [place] [id number] / [facility]    Anesthesia Start: 1343 Anesthesia Stop: 1421    Procedures:       ESOPHAGOGASTRODUODENOSCOPY BIOPSY (Abdomen)      COLONOSCOPY DIAGNOSTIC Diagnosis:       Gastroesophageal reflux disease, unspecified whether esophagitis present      Epigastric pain      Change in bowel habits      Abdominal pain, unspecified abdominal location      (Gastroesophageal reflux disease, unspecified whether esophagitis present [K21.9])      (Epigastric pain [R10.13])      (Change in bowel habits [R19.4])      (Abdominal pain, unspecified abdominal location [R10.9])    Surgeons: Fortunato Heredia MD Responsible Provider: Rolanda Peters APRN - CRNA    Anesthesia Type: general, TIVA ASA Status: 3            Anesthesia Type: No value filed.    Adolfo Phase I: Adolfo Score: 10    Adolfo Phase II:      Anesthesia Post Evaluation    Patient location during evaluation: bedside  Patient participation: complete - patient participated  Level of consciousness: sleepy but conscious  Pain score: 0  Airway patency: patent  Nausea & Vomiting: no nausea and no vomiting  Cardiovascular status: hemodynamically stable  Respiratory status: acceptable  Hydration status: stable  Pain management: adequate    No notable events documented.

## 2025-02-06 PROCEDURE — 93296 REM INTERROG EVL PM/IDS: CPT | Performed by: STUDENT IN AN ORGANIZED HEALTH CARE EDUCATION/TRAINING PROGRAM

## 2025-02-06 PROCEDURE — 93294 REM INTERROG EVL PM/LDLS PM: CPT | Performed by: STUDENT IN AN ORGANIZED HEALTH CARE EDUCATION/TRAINING PROGRAM

## 2025-07-22 RX ORDER — ESOMEPRAZOLE MAGNESIUM 40 MG/1
40 CAPSULE, DELAYED RELEASE ORAL
Qty: 30 CAPSULE | Refills: 11 | Status: SHIPPED | OUTPATIENT
Start: 2025-07-22

## 2025-08-07 LAB
MC_CV_MDC_IDC_RATE_1: 150
MC_CV_MDC_IDC_RATE_1: 171
MC_CV_MDC_IDC_THERAPIES: NORMAL
MC_CV_MDC_IDC_ZONE_ID: 2
MC_CV_MDC_IDC_ZONE_ID: 6
MDC_IDC_MSMT_BATTERY_REMAINING_LONGEVITY: 154 MO
MDC_IDC_MSMT_BATTERY_RRT_TRIGGER: 2.62
MDC_IDC_MSMT_BATTERY_STATUS: NORMAL
MDC_IDC_MSMT_BATTERY_VOLTAGE: 3.03
MDC_IDC_MSMT_LEADCHNL_RA_DTM: NORMAL
MDC_IDC_MSMT_LEADCHNL_RA_IMPEDANCE_VALUE: 418
MDC_IDC_MSMT_LEADCHNL_RA_PACING_THRESHOLD_AMPLITUDE: 0.62
MDC_IDC_MSMT_LEADCHNL_RA_PACING_THRESHOLD_POLARITY: NORMAL
MDC_IDC_MSMT_LEADCHNL_RA_PACING_THRESHOLD_PULSEWIDTH: 0.4
MDC_IDC_MSMT_LEADCHNL_RA_SENSING_INTR_AMPL: 2
MDC_IDC_MSMT_LEADCHNL_RV_DTM: NORMAL
MDC_IDC_MSMT_LEADCHNL_RV_IMPEDANCE_VALUE: 418
MDC_IDC_MSMT_LEADCHNL_RV_PACING_THRESHOLD_AMPLITUDE: 0.75
MDC_IDC_MSMT_LEADCHNL_RV_PACING_THRESHOLD_POLARITY: NORMAL
MDC_IDC_MSMT_LEADCHNL_RV_PACING_THRESHOLD_PULSEWIDTH: 0.4
MDC_IDC_MSMT_LEADCHNL_RV_SENSING_INTR_AMPL: 2.5
MDC_IDC_PG_IMPLANT_DTM: NORMAL
MDC_IDC_PG_MFG: NORMAL
MDC_IDC_PG_MODEL: NORMAL
MDC_IDC_PG_SERIAL: NORMAL
MDC_IDC_PG_TYPE: NORMAL
MDC_IDC_SESS_DTM: NORMAL
MDC_IDC_SESS_TYPE: NORMAL
MDC_IDC_SET_BRADY_AT_MODE_SWITCH_RATE: 171
MDC_IDC_SET_BRADY_LOWRATE: 60
MDC_IDC_SET_BRADY_MAX_SENSOR_RATE: 130
MDC_IDC_SET_BRADY_MAX_TRACKING_RATE: 130
MDC_IDC_SET_BRADY_MODE: NORMAL
MDC_IDC_SET_BRADY_PAV_DELAY: 180
MDC_IDC_SET_BRADY_SAV_DELAY: 150
MDC_IDC_SET_LEADCHNL_RA_PACING_AMPLITUDE: 1.5
MDC_IDC_SET_LEADCHNL_RA_PACING_POLARITY: NORMAL
MDC_IDC_SET_LEADCHNL_RA_PACING_PULSEWIDTH: 0.4
MDC_IDC_SET_LEADCHNL_RA_SENSING_POLARITY: NORMAL
MDC_IDC_SET_LEADCHNL_RA_SENSING_SENSITIVITY: 0.3
MDC_IDC_SET_LEADCHNL_RV_PACING_AMPLITUDE: 2
MDC_IDC_SET_LEADCHNL_RV_PACING_POLARITY: NORMAL
MDC_IDC_SET_LEADCHNL_RV_PACING_PULSEWIDTH: 0.4
MDC_IDC_SET_LEADCHNL_RV_SENSING_POLARITY: NORMAL
MDC_IDC_SET_LEADCHNL_RV_SENSING_SENSITIVITY: 0.9
MDC_IDC_SET_ZONE_STATUS: NORMAL
MDC_IDC_SET_ZONE_STATUS: NORMAL
MDC_IDC_SET_ZONE_TYPE: NORMAL
MDC_IDC_SET_ZONE_TYPE: NORMAL
MDC_IDC_STAT_AT_BURDEN_PERCENT: 0
MDC_IDC_STAT_BRADY_RA_PERCENT_PACED: 0.23
MDC_IDC_STAT_BRADY_RV_PERCENT_PACED: 0.14

## 2025-08-15 ENCOUNTER — OFFICE VISIT (OUTPATIENT)
Dept: GASTROENTEROLOGY | Age: 60
End: 2025-08-15
Payer: COMMERCIAL

## 2025-08-15 VITALS
OXYGEN SATURATION: 97 % | SYSTOLIC BLOOD PRESSURE: 134 MMHG | HEIGHT: 63 IN | DIASTOLIC BLOOD PRESSURE: 84 MMHG | BODY MASS INDEX: 45.54 KG/M2 | WEIGHT: 257 LBS | HEART RATE: 108 BPM

## 2025-08-15 DIAGNOSIS — R10.13 EPIGASTRIC PAIN: Primary | ICD-10-CM

## 2025-08-15 DIAGNOSIS — R11.0 NAUSEA: ICD-10-CM

## 2025-08-15 DIAGNOSIS — K64.8 INTERNAL HEMORRHOIDS: ICD-10-CM

## 2025-08-15 DIAGNOSIS — R19.4 CHANGE IN BOWEL HABITS: ICD-10-CM

## 2025-08-15 DIAGNOSIS — K21.9 GASTROESOPHAGEAL REFLUX DISEASE, UNSPECIFIED WHETHER ESOPHAGITIS PRESENT: ICD-10-CM

## 2025-08-15 DIAGNOSIS — K64.4 ANAL SKIN TAG: ICD-10-CM

## 2025-08-15 DIAGNOSIS — R10.84 GENERALIZED ABDOMINAL PAIN: ICD-10-CM

## 2025-08-15 PROCEDURE — 99214 OFFICE O/P EST MOD 30 MIN: CPT | Performed by: NURSE PRACTITIONER

## 2025-08-15 RX ORDER — PROCHLORPERAZINE MALEATE 5 MG/1
10 TABLET ORAL EVERY 8 HOURS PRN
Qty: 30 TABLET | Refills: 3 | Status: SHIPPED | OUTPATIENT
Start: 2025-08-15

## 2025-08-15 RX ORDER — ESOMEPRAZOLE MAGNESIUM 40 MG/1
40 CAPSULE, DELAYED RELEASE ORAL 2 TIMES DAILY
Qty: 60 CAPSULE | Refills: 3 | Status: SHIPPED | OUTPATIENT
Start: 2025-08-15

## 2025-08-15 RX ORDER — PANTOPRAZOLE SODIUM 20 MG/1
20 TABLET, DELAYED RELEASE ORAL DAILY
COMMUNITY
End: 2025-08-15 | Stop reason: ALTCHOICE

## 2025-08-21 ENCOUNTER — HOSPITAL ENCOUNTER (OUTPATIENT)
Dept: GENERAL RADIOLOGY | Age: 60
Discharge: HOME | End: 2025-08-21
Payer: COMMERCIAL

## 2025-08-21 DIAGNOSIS — R10.84 GENERALIZED ABDOMINAL PAIN: ICD-10-CM

## 2025-08-21 DIAGNOSIS — R10.13 EPIGASTRIC PAIN: ICD-10-CM

## 2025-08-21 DIAGNOSIS — K21.9 GASTROESOPHAGEAL REFLUX DISEASE, UNSPECIFIED WHETHER ESOPHAGITIS PRESENT: ICD-10-CM

## 2025-08-21 DIAGNOSIS — R19.4 CHANGE IN BOWEL HABITS: ICD-10-CM

## 2025-08-21 PROCEDURE — 6360000004 HC RX CONTRAST MEDICATION: Performed by: NURSE PRACTITIONER

## 2025-08-21 PROCEDURE — 74178 CT ABD&PLV WO CNTR FLWD CNTR: CPT

## 2025-08-21 RX ORDER — IOPAMIDOL 755 MG/ML
75 INJECTION, SOLUTION INTRAVASCULAR
Status: COMPLETED | OUTPATIENT
Start: 2025-08-21 | End: 2025-08-21

## 2025-08-21 RX ADMIN — IOPAMIDOL 75 ML: 755 INJECTION, SOLUTION INTRAVENOUS at 09:11

## 2025-08-22 ASSESSMENT — ENCOUNTER SYMPTOMS
CONSTIPATION: 0
TROUBLE SWALLOWING: 0
COUGH: 0
SHORTNESS OF BREATH: 0
CHOKING: 0
VOMITING: 0
DIARRHEA: 0
ABDOMINAL DISTENTION: 0
NAUSEA: 1
BLOOD IN STOOL: 0
ANAL BLEEDING: 0
ABDOMINAL PAIN: 1

## 2025-08-28 ENCOUNTER — OFFICE VISIT (OUTPATIENT)
Dept: SURGERY | Age: 60
End: 2025-08-28
Payer: COMMERCIAL

## 2025-08-28 VITALS — OXYGEN SATURATION: 98 % | WEIGHT: 257 LBS | HEART RATE: 94 BPM | HEIGHT: 63 IN | BODY MASS INDEX: 45.54 KG/M2

## 2025-08-28 DIAGNOSIS — K62.89 HYPERTROPHY OF ANAL PAPILLAE: Primary | ICD-10-CM

## 2025-08-28 PROCEDURE — 99203 OFFICE O/P NEW LOW 30 MIN: CPT | Performed by: SURGERY

## 2025-08-29 DIAGNOSIS — K76.9 LIVER LESION: Primary | ICD-10-CM

## 2025-09-06 DIAGNOSIS — K76.9 LIVER LESION: Primary | ICD-10-CM

## (undated) DEVICE — Device

## (undated) DEVICE — SOL IRR NACL 0.9PCT BT 1000ML

## (undated) DEVICE — TBG PENCL TELESCP MEGADYNE SMOKE EVAC 10FT

## (undated) DEVICE — SYS COL WAST NAMIC IV SGL/LN FML/FIT W/VNT/SPK/HD 72IN

## (undated) DEVICE — DRSNG SURESITE WNDW 4X4.5

## (undated) DEVICE — 3M™ IOBAN™ 2 ANTIMICROBIAL INCISE DRAPE 6650EZ: Brand: IOBAN™ 2

## (undated) DEVICE — ENDO KIT,LOURDES HOSPITAL: Brand: MEDLINE INDUSTRIES, INC.

## (undated) DEVICE — APPL CHLORAPREP HI/LITE 26ML ORNG

## (undated) DEVICE — PAD MINOR UNIVERSAL: Brand: MEDLINE INDUSTRIES, INC.

## (undated) DEVICE — SI AVANTI+ 7F STD W/GW  NO OBT: Brand: AVANTI

## (undated) DEVICE — ELECTRD BLD EDGE/INSUL1P 2.4X5.1MM STRL

## (undated) DEVICE — CABL BIPOL W/ALLGTR CLIP/SM 12FT

## (undated) DEVICE — FORCEPS BX L240CM JAW DIA2.8MM L CAP W/ NDL MIC MESH TOOTH

## (undated) DEVICE — SI AVANTI+ 6F STD W/GW  NO OBT: Brand: AVANTI

## (undated) DEVICE — ADHS SKIN PREMIERPRO EXOFIN TOPICAL HI/VISC .5ML

## (undated) DEVICE — SOL NS 500ML

## (undated) DEVICE — TBG PRESS/MONITR FIX M/F LL A/ 48IN STRL

## (undated) DEVICE — PAD, DEFIB, ADULT, RADIOTRANS, PHYSIO: Brand: MEDLINE

## (undated) DEVICE — SOLIDIFIER LIQUI LOC PLUS 2000CC

## (undated) DEVICE — PATIENT RETURN ELECTRODE, SINGLE-USE, CONTACT QUALITY MONITORING, ADULT, WITH 9FT CORD, FOR PATIENTS WEIGING OVER 33LBS. (15KG): Brand: MEGADYNE

## (undated) DEVICE — ADHS LIQ MASTISOL 2/3ML

## (undated) DEVICE — INTRO TEAR AWAY/LVD W/SD PRT 7F 13CM

## (undated) DEVICE — ANTIBACTERIAL UNDYED BRAIDED (POLYGLACTIN 910), SYNTHETIC ABSORBABLE SUTURE: Brand: COATED VICRYL

## (undated) DEVICE — SYS CLS VASC/VENI VASCADE MVP 6TO12F

## (undated) DEVICE — Device: Brand: SMARTABLATE

## (undated) DEVICE — 3M™ STERI-STRIP™ REINFORCED ADHESIVE SKIN CLOSURES, R1547, 1/2 IN X 4 IN (12 MM X 100 MM), 6 STRIPS/ENVELOPE: Brand: 3M™ STERI-STRIP™

## (undated) DEVICE — INTRO PEELAWAY SAFESHEATH II HEMO 7F23CM

## (undated) DEVICE — 3M™ STERI-STRIP™ REINFORCED ADHESIVE SKIN CLOSURES, R1546, 1/4 IN X 4 IN (6 MM X 100 MM), 10 STRIPS/ENVELOPE: Brand: 3M™ STERI-STRIP™

## (undated) DEVICE — PK CATH CARD 30 CA/4

## (undated) DEVICE — PK TURNOVER RM ADV

## (undated) DEVICE — KT NDL GUIDE STRL 18GA

## (undated) DEVICE — BI-DIRECTIONAL NAVIGATION CATHETER, NAV, D-F: Brand: QDOT MICRO

## (undated) DEVICE — SPNG GZ STRL 2S 4X4 12PLY

## (undated) DEVICE — GW SUP AMPLATZ ULTR/STFF/STR PTFE SS 0.35IN 145CM

## (undated) DEVICE — SUREFIT, DUAL DISPERSIVE ELECTRODE, CONTACT QUALITY MONITOR: Brand: SUREFIT

## (undated) DEVICE — Device: Brand: REFERENCE PATCH CARTO 3

## (undated) DEVICE — GW WHOLEY HITORQ MOD/J .035 175CM

## (undated) DEVICE — GLV SURG BIOGEL LTX PF 6 1/2

## (undated) DEVICE — GOLDVAC PUSH BUTTON ELECTROSURGICAL SMOKE EVACUATION HANDPIECE: Brand: GOLDVAC

## (undated) DEVICE — Device: Brand: THERMOCOOL SMARTTOUCH SF

## (undated) DEVICE — SI AVANTI+ 8F STD W/GW  NO OBT: Brand: AVANTI

## (undated) DEVICE — TRY PREP SCRB VAG PVP

## (undated) DEVICE — PK PM 30

## (undated) DEVICE — Device: Brand: WEBSTER

## (undated) DEVICE — INTRO TEAR AWAY/LVD W/SD PRT 8F 13CM